# Patient Record
Sex: MALE | Race: WHITE | NOT HISPANIC OR LATINO | Employment: FULL TIME | URBAN - METROPOLITAN AREA
[De-identification: names, ages, dates, MRNs, and addresses within clinical notes are randomized per-mention and may not be internally consistent; named-entity substitution may affect disease eponyms.]

---

## 2017-01-03 DIAGNOSIS — IMO0002 UNCONTROLLED TYPE 2 DIABETES MELLITUS WITH COMPLICATION, WITHOUT LONG-TERM CURRENT USE OF INSULIN: ICD-10-CM

## 2017-01-25 DIAGNOSIS — IMO0002 UNCONTROLLED TYPE 2 DIABETES MELLITUS WITH COMPLICATION, WITHOUT LONG-TERM CURRENT USE OF INSULIN: ICD-10-CM

## 2017-01-26 RX ORDER — INSULIN ASPART 100 [IU]/ML
INJECTION, SOLUTION INTRAVENOUS; SUBCUTANEOUS
Qty: 10 PEN | Refills: 1 | Status: SHIPPED | OUTPATIENT
Start: 2017-01-26 | End: 2017-02-27

## 2017-02-01 ENCOUNTER — OFFICE VISIT (OUTPATIENT)
Dept: FAMILY MEDICINE CLINIC | Facility: CLINIC | Age: 62
End: 2017-02-01

## 2017-02-01 VITALS
TEMPERATURE: 97.5 F | BODY MASS INDEX: 32.92 KG/M2 | WEIGHT: 264.8 LBS | HEART RATE: 83 BPM | SYSTOLIC BLOOD PRESSURE: 120 MMHG | HEIGHT: 75 IN | OXYGEN SATURATION: 95 % | DIASTOLIC BLOOD PRESSURE: 68 MMHG

## 2017-02-01 DIAGNOSIS — J40 BRONCHITIS: ICD-10-CM

## 2017-02-01 DIAGNOSIS — R53.83 TIRED: Primary | ICD-10-CM

## 2017-02-01 PROCEDURE — 99214 OFFICE O/P EST MOD 30 MIN: CPT | Performed by: FAMILY MEDICINE

## 2017-02-01 RX ORDER — LEVOFLOXACIN 500 MG/1
500 TABLET, FILM COATED ORAL DAILY
Qty: 10 TABLET | Refills: 1 | Status: SHIPPED | OUTPATIENT
Start: 2017-02-01 | End: 2017-02-11

## 2017-02-01 NOTE — PROGRESS NOTES
Subjective   Kem Alvarado is a 61 y.o. male.     History of Present Illness Kem Laket 61 y.o. male who presents for evaluation of upper respiratory congestion. Symptoms include congestion, dry cough and wheezing.  Onset of symptoms was 2 weeks ago, gradually worsening since that time. Patient denies shortness of breath, fever, sinus pain.   Evaluation to date: none Treatment to date:  none.       The following portions of the patient's history were reviewed and updated as appropriate: allergies, current medications, past family history, past medical history, past social history, past surgical history and problem list.    Review of Systems   Constitutional: Positive for fatigue. Negative for fever.   HENT: Negative for congestion, postnasal drip, sinus pressure and sore throat.    Respiratory: Positive for cough and wheezing. Negative for shortness of breath.    Gastrointestinal: Negative for nausea and vomiting.   Skin: Negative.        Objective   Physical Exam   Constitutional: He appears well-developed and well-nourished.   HENT:   Head: Normocephalic.   Right Ear: Tympanic membrane normal.   Left Ear: Tympanic membrane normal.   Mouth/Throat: Oropharynx is clear and moist.   Eyes: EOM are normal. Pupils are equal, round, and reactive to light.   Cardiovascular: Normal rate, regular rhythm and normal heart sounds.    Pulmonary/Chest: Effort normal and breath sounds normal.   Lymphadenopathy:     He has no cervical adenopathy.   Skin: Skin is warm and dry.   Psychiatric: He has a normal mood and affect. His behavior is normal. Judgment and thought content normal.   Vitals reviewed.      Assessment/Plan   Kem was seen today for uri and cough.    Diagnoses and all orders for this visit:    Tired  -     Testosterone  -     TSH  -     CBC & AUTO Differential    Bronchitis  -     levoFLOXacin (LEVAQUIN) 500 MG tablet; Take 1 tablet by mouth Daily for 10 days.

## 2017-02-02 LAB
BASOPHILS # BLD AUTO: 0.03 10*3/MM3 (ref 0–0.2)
BASOPHILS NFR BLD AUTO: 0.4 % (ref 0–1.5)
CONV COMMENT: ABNORMAL
EOSINOPHIL # BLD AUTO: 0.19 10*3/MM3 (ref 0–0.7)
EOSINOPHIL NFR BLD AUTO: 2.6 % (ref 0.3–6.2)
ERYTHROCYTE [DISTWIDTH] IN BLOOD BY AUTOMATED COUNT: 13.5 % (ref 11.5–14.5)
HCT VFR BLD AUTO: 42.2 % (ref 40.4–52.2)
HGB BLD-MCNC: 13.8 G/DL (ref 13.7–17.6)
IMM GRANULOCYTES # BLD: 0.02 10*3/MM3 (ref 0–0.03)
IMM GRANULOCYTES NFR BLD: 0.3 % (ref 0–0.5)
LYMPHOCYTES # BLD AUTO: 1.56 10*3/MM3 (ref 0.9–4.8)
LYMPHOCYTES NFR BLD AUTO: 21 % (ref 19.6–45.3)
MCH RBC QN AUTO: 29.1 PG (ref 27–32.7)
MCHC RBC AUTO-ENTMCNC: 32.7 G/DL (ref 32.6–36.4)
MCV RBC AUTO: 89 FL (ref 79.8–96.2)
MONOCYTES # BLD AUTO: 0.71 10*3/MM3 (ref 0.2–1.2)
MONOCYTES NFR BLD AUTO: 9.6 % (ref 5–12)
NEUTROPHILS # BLD AUTO: 4.92 10*3/MM3 (ref 1.9–8.1)
NEUTROPHILS NFR BLD AUTO: 66.1 % (ref 42.7–76)
PLATELET # BLD AUTO: 237 10*3/MM3 (ref 140–500)
RBC # BLD AUTO: 4.74 10*6/MM3 (ref 4.6–6)
TESTOST SERPL-MCNC: 151 NG/DL (ref 348–1197)
TSH SERPL DL<=0.005 MIU/L-ACNC: 3.45 MIU/ML (ref 0.27–4.2)
WBC # BLD AUTO: 7.43 10*3/MM3 (ref 4.5–10.7)

## 2017-02-02 RX ORDER — TESTOSTERONE 16.2 MG/G
2 GEL TRANSDERMAL DAILY
Qty: 75 G | Refills: 5 | Status: SHIPPED | OUTPATIENT
Start: 2017-02-02 | End: 2017-08-15 | Stop reason: SDUPTHER

## 2017-02-14 DIAGNOSIS — IMO0002 UNCONTROLLED TYPE 2 DIABETES MELLITUS WITH COMPLICATION, WITHOUT LONG-TERM CURRENT USE OF INSULIN: ICD-10-CM

## 2017-02-14 DIAGNOSIS — E11.69 HYPERLIPIDEMIA ASSOCIATED WITH TYPE 2 DIABETES MELLITUS (HCC): Primary | ICD-10-CM

## 2017-02-14 DIAGNOSIS — E11.9 CONTROLLED TYPE 2 DIABETES MELLITUS WITHOUT COMPLICATION, WITHOUT LONG-TERM CURRENT USE OF INSULIN (HCC): ICD-10-CM

## 2017-02-14 DIAGNOSIS — E55.9 VITAMIN D DEFICIENCY: Primary | ICD-10-CM

## 2017-02-14 DIAGNOSIS — E03.9 ACQUIRED HYPOTHYROIDISM: ICD-10-CM

## 2017-02-14 DIAGNOSIS — E78.5 HYPERLIPIDEMIA ASSOCIATED WITH TYPE 2 DIABETES MELLITUS (HCC): Primary | ICD-10-CM

## 2017-02-14 RX ORDER — INSULIN GLARGINE 100 [IU]/ML
INJECTION, SOLUTION SUBCUTANEOUS
Qty: 15 ML | Refills: 0 | Status: SHIPPED | OUTPATIENT
Start: 2017-02-14 | End: 2017-02-27 | Stop reason: SDUPTHER

## 2017-02-20 ENCOUNTER — CONVERSION ENCOUNTER (OUTPATIENT)
Dept: ENDOCRINOLOGY | Age: 62
End: 2017-02-20

## 2017-02-20 ENCOUNTER — LAB (OUTPATIENT)
Dept: ENDOCRINOLOGY | Age: 62
End: 2017-02-20

## 2017-02-20 DIAGNOSIS — E11.69 HYPERLIPIDEMIA ASSOCIATED WITH TYPE 2 DIABETES MELLITUS (HCC): ICD-10-CM

## 2017-02-20 DIAGNOSIS — E03.9 ACQUIRED HYPOTHYROIDISM: ICD-10-CM

## 2017-02-20 DIAGNOSIS — E55.9 VITAMIN D DEFICIENCY: Primary | ICD-10-CM

## 2017-02-20 DIAGNOSIS — E55.9 VITAMIN D DEFICIENCY: ICD-10-CM

## 2017-02-20 DIAGNOSIS — E78.5 HYPERLIPIDEMIA ASSOCIATED WITH TYPE 2 DIABETES MELLITUS (HCC): ICD-10-CM

## 2017-02-20 DIAGNOSIS — E11.9 CONTROLLED TYPE 2 DIABETES MELLITUS WITHOUT COMPLICATION, WITHOUT LONG-TERM CURRENT USE OF INSULIN (HCC): ICD-10-CM

## 2017-02-21 LAB
25(OH)D3+25(OH)D2 SERPL-MCNC: 20.5 NG/ML (ref 30–100)
ALBUMIN SERPL-MCNC: 4.5 G/DL (ref 3.5–5.2)
ALBUMIN/GLOB SERPL: 1.7 G/DL
ALP SERPL-CCNC: 78 U/L (ref 39–117)
ALT SERPL-CCNC: 29 U/L (ref 1–41)
AST SERPL-CCNC: 33 U/L (ref 1–40)
BILIRUB SERPL-MCNC: 0.5 MG/DL (ref 0.1–1.2)
BUN SERPL-MCNC: 29 MG/DL (ref 8–23)
BUN/CREAT SERPL: 18.2 (ref 7–25)
C PEPTIDE SERPL-MCNC: 1.5 NG/ML (ref 1.1–4.4)
CALCIUM SERPL-MCNC: 9.5 MG/DL (ref 8.6–10.5)
CHLORIDE SERPL-SCNC: 102 MMOL/L (ref 98–107)
CHOLEST SERPL-MCNC: 170 MG/DL (ref 0–200)
CO2 SERPL-SCNC: 23.9 MMOL/L (ref 22–29)
CREAT SERPL-MCNC: 1.59 MG/DL (ref 0.76–1.27)
GLOBULIN SER CALC-MCNC: 2.7 GM/DL
GLUCOSE SERPL-MCNC: 118 MG/DL (ref 65–99)
HBA1C MFR BLD: 7.6 % (ref 4.8–5.6)
HDLC SERPL-MCNC: 43 MG/DL (ref 40–60)
LDLC SERPL CALC-MCNC: 56 MG/DL (ref 0–100)
MICROALBUMIN UR-MCNC: 20.3 UG/ML
POTASSIUM SERPL-SCNC: 4.1 MMOL/L (ref 3.5–5.2)
PROT SERPL-MCNC: 7.2 G/DL (ref 6–8.5)
SODIUM SERPL-SCNC: 143 MMOL/L (ref 136–145)
T3FREE SERPL-MCNC: 3.3 PG/ML (ref 2–4.4)
T4 FREE SERPL-MCNC: 0.96 NG/DL (ref 0.93–1.7)
T4 SERPL-MCNC: 5.36 MCG/DL (ref 4.5–11.7)
TRIGL SERPL-MCNC: 355 MG/DL (ref 0–150)
TSH SERPL DL<=0.005 MIU/L-ACNC: 5.64 MIU/ML (ref 0.27–4.2)
URATE SERPL-MCNC: 9 MG/DL (ref 3.4–7)
VLDLC SERPL CALC-MCNC: 71 MG/DL (ref 5–40)

## 2017-02-27 ENCOUNTER — OFFICE VISIT (OUTPATIENT)
Dept: ENDOCRINOLOGY | Age: 62
End: 2017-02-27

## 2017-02-27 VITALS
HEIGHT: 75 IN | DIASTOLIC BLOOD PRESSURE: 88 MMHG | BODY MASS INDEX: 32.9 KG/M2 | WEIGHT: 264.6 LBS | SYSTOLIC BLOOD PRESSURE: 142 MMHG | RESPIRATION RATE: 16 BRPM

## 2017-02-27 DIAGNOSIS — E78.1 FAMILIAL HYPERTRIGLYCERIDEMIA: ICD-10-CM

## 2017-02-27 DIAGNOSIS — E78.2 MIXED HYPERLIPIDEMIA: ICD-10-CM

## 2017-02-27 DIAGNOSIS — E11.69 HYPERLIPIDEMIA ASSOCIATED WITH TYPE 2 DIABETES MELLITUS (HCC): ICD-10-CM

## 2017-02-27 DIAGNOSIS — IMO0002 UNCONTROLLED TYPE 2 DIABETES MELLITUS WITH COMPLICATION, WITHOUT LONG-TERM CURRENT USE OF INSULIN: ICD-10-CM

## 2017-02-27 DIAGNOSIS — I10 BENIGN ESSENTIAL HYPERTENSION: ICD-10-CM

## 2017-02-27 DIAGNOSIS — E03.9 ACQUIRED HYPOTHYROIDISM: ICD-10-CM

## 2017-02-27 DIAGNOSIS — R79.89 LOW TESTOSTERONE: ICD-10-CM

## 2017-02-27 DIAGNOSIS — E78.5 HYPERLIPIDEMIA ASSOCIATED WITH TYPE 2 DIABETES MELLITUS (HCC): ICD-10-CM

## 2017-02-27 DIAGNOSIS — E11.9 CONTROLLED TYPE 2 DIABETES MELLITUS WITHOUT COMPLICATION, WITHOUT LONG-TERM CURRENT USE OF INSULIN (HCC): Primary | ICD-10-CM

## 2017-02-27 PROCEDURE — 99215 OFFICE O/P EST HI 40 MIN: CPT | Performed by: INTERNAL MEDICINE

## 2017-02-27 RX ORDER — ICOSAPENT ETHYL 1000 MG/1
2 CAPSULE ORAL 2 TIMES DAILY WITH MEALS
Qty: 120 CAPSULE | Refills: 3 | Status: SHIPPED | OUTPATIENT
Start: 2017-02-27 | End: 2017-05-15 | Stop reason: SDUPTHER

## 2017-02-27 RX ORDER — NATEGLINIDE 120 MG/1
120 TABLET ORAL
Qty: 90 TABLET | Refills: 5 | Status: SHIPPED | OUTPATIENT
Start: 2017-02-27 | End: 2017-05-15 | Stop reason: SDUPTHER

## 2017-02-27 RX ORDER — ROSUVASTATIN CALCIUM 20 MG/1
20 TABLET, COATED ORAL NIGHTLY
Qty: 30 TABLET | Refills: 5 | Status: SHIPPED | OUTPATIENT
Start: 2017-02-27 | End: 2017-05-15 | Stop reason: SDUPTHER

## 2017-02-27 RX ORDER — LIRAGLUTIDE 6 MG/ML
INJECTION SUBCUTANEOUS
Qty: 3 PEN | Refills: 5 | Status: SHIPPED | OUTPATIENT
Start: 2017-02-27 | End: 2017-05-15 | Stop reason: SDUPTHER

## 2017-02-27 RX ORDER — LEVOTHYROXINE SODIUM 0.07 MG/1
75 TABLET ORAL DAILY
Qty: 30 TABLET | Refills: 5 | Status: SHIPPED | OUTPATIENT
Start: 2017-02-27 | End: 2017-05-15 | Stop reason: SDUPTHER

## 2017-02-27 RX ORDER — ALLOPURINOL 100 MG/1
100 TABLET ORAL DAILY
Qty: 30 TABLET | Refills: 5 | Status: SHIPPED | OUTPATIENT
Start: 2017-02-27 | End: 2017-05-15 | Stop reason: SDUPTHER

## 2017-02-27 RX ORDER — FENOFIBRATE 145 MG/1
145 TABLET, COATED ORAL DAILY
Qty: 30 TABLET | Refills: 5 | Status: SHIPPED | OUTPATIENT
Start: 2017-02-27 | End: 2017-05-15 | Stop reason: SDUPTHER

## 2017-02-27 NOTE — PROGRESS NOTES
"Subjective   Kem Alvarado is a 61 y.o. male seen for follow up for DM2, hyperlipidemia, vit d deficiency, lab review. Patient is checking BG 2-3 times a day. He had one hypoglycemic episode and BG dropped to the 50's. He denies any other problems or concerns.     History of Present Illness this is a 61-year-old gentleman known patient with type II diabetes hypertension dyslipidemia as well as vitamin D deficiency as well as hypothyroidism.  Over the course of last the 4 months she has had no significant health problems for which to go to emergency room or hospital.  Visit Vitals   • /88   • Resp 16   • Ht 75\" (190.5 cm)   • Wt 264 lb 9.6 oz (120 kg)   • BMI 33.07 kg/m2     No Known Allergies      Current Outpatient Prescriptions:   •  amLODIPine (NORVASC) 5 MG tablet, Take 1 tablet by mouth 2 (Two) Times a Day., Disp: 60 tablet, Rfl: 5  •  ELIQUIS 5 MG tablet tablet, TAKE ONE TABLET BY MOUTH TWO TIMES A DAY, Disp: 60 tablet, Rfl: 11  •  glucose blood (ACCU-CHEK MARIA L) test strip, Check blood sugar 4-5 times daily, Disp: 200 each, Rfl: 5  •  icosapent ethyl (VASCEPA) 1 G capsule capsule, Take 2 g by mouth 2 (Two) Times a Day With Meals., Disp: 120 capsule, Rfl: 3  •  Insulin Pen Needle (PEN NEEDLES) 31G X 6 MM misc, 5-6 injections daily, Disp: 200 each, Rfl: 3  •  LANTUS SOLOSTAR 100 UNIT/ML injection pen, Inject 35 units in the morning and titrate up as instructed, Disp: 15 mL, Rfl: 0  •  levothyroxine (SYNTHROID, LEVOTHROID) 25 MCG tablet, TAKE 1 TABLET BY MOUTH DAILY AS DIRECTED., Disp: 30 tablet, Rfl: 5  •  metoprolol tartrate (LOPRESSOR) 50 MG tablet, Take 1 tablet by mouth 2 (Two) Times a Day., Disp: 60 tablet, Rfl: 5  •  NOVOLOG FLEXPEN 100 UNIT/ML solution pen-injector sc pen, Inject 15 units before meals meals with sliding scale, Disp: 10 pen, Rfl: 1  •  rosuvastatin (CRESTOR) 20 MG tablet, Take 1 tablet by mouth Every Night., Disp: 30 tablet, Rfl: 4  •  saxagliptin (ONGLYZA) 5 MG tablet, Take 1 " tablet by mouth Daily., Disp: 30 tablet, Rfl: 5  •  Testosterone (ANDROGEL PUMP) 20.25 MG/ACT (1.62%) gel, Place 2 applicators on the skin Daily., Disp: 75 g, Rfl: 5        The following portions of the patient's history were reviewed and updated as appropriate: allergies, current medications, past family history, past medical history, past social history, past surgical history and problem list.    Review of Systems   Constitutional: Positive for fatigue.   HENT: Negative.    Eyes: Negative.    Respiratory: Negative.    Cardiovascular: Negative.    Gastrointestinal: Negative.    Endocrine: Positive for polydipsia.   Genitourinary: Negative.    Musculoskeletal: Negative.    Skin: Negative.  Negative for pallor.   Allergic/Immunologic: Negative.    Neurological: Positive for tremors.   Hematological: Negative.    Psychiatric/Behavioral: The patient is nervous/anxious.        Objective   Physical Exam   Constitutional: He is oriented to person, place, and time. He appears well-developed and well-nourished. No distress.   HENT:   Head: Normocephalic and atraumatic.   Right Ear: External ear normal.   Left Ear: External ear normal.   Nose: Nose normal.   Mouth/Throat: Oropharynx is clear and moist. No oropharyngeal exudate.   Eyes: Conjunctivae and EOM are normal. Pupils are equal, round, and reactive to light. Right eye exhibits no discharge. Left eye exhibits no discharge. No scleral icterus.   Neck: Normal range of motion. Neck supple. No JVD present. No tracheal deviation present. No thyromegaly present.   Cardiovascular: Normal rate, regular rhythm, normal heart sounds and intact distal pulses.  Exam reveals no gallop and no friction rub.    No murmur heard.  Pulmonary/Chest: Effort normal and breath sounds normal. No stridor. No respiratory distress. He has no wheezes. He has no rales. He exhibits no tenderness.   Abdominal: Soft. Bowel sounds are normal. He exhibits no distension and no mass. There is no  tenderness. There is no rebound and no guarding. No hernia.   Musculoskeletal: Normal range of motion. He exhibits no edema, tenderness or deformity.   Lymphadenopathy:     He has no cervical adenopathy.   Neurological: He is alert and oriented to person, place, and time. He has normal reflexes. He displays normal reflexes. No cranial nerve deficit. He exhibits normal muscle tone. Coordination normal.   Skin: Skin is warm and dry. No rash noted. He is not diaphoretic. No erythema. No pallor.   Psychiatric: He has a normal mood and affect. His behavior is normal. Judgment and thought content normal.   Nursing note and vitals reviewed.     Results for orders placed or performed in visit on 02/20/17   Comprehensive Metabolic Panel   Result Value Ref Range    Glucose 118 (H) 65 - 99 mg/dL    BUN 29 (H) 8 - 23 mg/dL    Creatinine 1.59 (H) 0.76 - 1.27 mg/dL    eGFR Non African Am 45 (L) >60 mL/min/1.73    eGFR African Am 54 (L) >60 mL/min/1.73    BUN/Creatinine Ratio 18.2 7.0 - 25.0    Sodium 143 136 - 145 mmol/L    Potassium 4.1 3.5 - 5.2 mmol/L    Chloride 102 98 - 107 mmol/L    Total CO2 23.9 22.0 - 29.0 mmol/L    Calcium 9.5 8.6 - 10.5 mg/dL    Total Protein 7.2 6.0 - 8.5 g/dL    Albumin 4.50 3.50 - 5.20 g/dL    Globulin 2.7 gm/dL    A/G Ratio 1.7 g/dL    Total Bilirubin 0.5 0.1 - 1.2 mg/dL    Alkaline Phosphatase 78 39 - 117 U/L    AST (SGOT) 33 1 - 40 U/L    ALT (SGPT) 29 1 - 41 U/L   C-Peptide   Result Value Ref Range    C-Peptide 1.5 1.1 - 4.4 ng/mL   Hemoglobin A1c   Result Value Ref Range    Hemoglobin A1C 7.60 (H) 4.80 - 5.60 %   Lipid Panel   Result Value Ref Range    Total Cholesterol 170 0 - 200 mg/dL    Triglycerides 355 (H) 0 - 150 mg/dL    HDL Cholesterol 43 40 - 60 mg/dL    VLDL Cholesterol 71 (H) 5 - 40 mg/dL    LDL Cholesterol  56 0 - 100 mg/dL   T4, Free   Result Value Ref Range    Free T4 0.96 0.93 - 1.70 ng/dL   T4 & TSH ([facility])   Result Value Ref Range    TSH 5.640 (H) 0.270 - 4.200 mIU/mL    T4,  Total 5.36 4.50 - 11.70 mcg/dL   T3, Free   Result Value Ref Range    T3, Free 3.3 2.0 - 4.4 pg/mL   Uric Acid   Result Value Ref Range    Uric Acid 9.0 (H) 3.4 - 7.0 mg/dL   MicroAlbumin, Urine, Random   Result Value Ref Range    Microalbumin, Urine 20.3 Not Estab. ug/mL           Assessment/Plan   Diagnoses and all orders for this visit:    Controlled type 2 diabetes mellitus without complication, without long-term current use of insulin  -     T3, Free; Future  -     T4 & TSH ([facility]); Future  -     T4, Free; Future  -     Uric Acid; Future  -     Vitamin D 25 Hydroxy; Future  -     Comprehensive Metabolic Panel; Future  -     C-Peptide; Future  -     Hemoglobin A1c; Future  -     Lipid Panel; Future  -     MicroAlbumin, Urine, Random; Future  -     Luteinizing Hormone; Future  -     Follicle Stimulating Hormone; Future  -     PSA; Future  -     Hemoglobin & Hematocrit, Blood; Future    Acquired hypothyroidism  -     T3, Free; Future  -     T4 & TSH ([facility]); Future  -     T4, Free; Future  -     Uric Acid; Future  -     Vitamin D 25 Hydroxy; Future  -     Comprehensive Metabolic Panel; Future  -     C-Peptide; Future  -     Hemoglobin A1c; Future  -     Lipid Panel; Future  -     MicroAlbumin, Urine, Random; Future  -     Luteinizing Hormone; Future  -     Follicle Stimulating Hormone; Future  -     PSA; Future  -     Hemoglobin & Hematocrit, Blood; Future    Benign essential hypertension  -     T3, Free; Future  -     T4 & TSH ([facility]); Future  -     T4, Free; Future  -     Uric Acid; Future  -     Vitamin D 25 Hydroxy; Future  -     Comprehensive Metabolic Panel; Future  -     C-Peptide; Future  -     Hemoglobin A1c; Future  -     Lipid Panel; Future  -     MicroAlbumin, Urine, Random; Future  -     Luteinizing Hormone; Future  -     Follicle Stimulating Hormone; Future  -     PSA; Future  -     Hemoglobin & Hematocrit, Blood; Future    Hyperlipidemia associated with type 2 diabetes mellitus  -     T3,  Free; Future  -     T4 & TSH ([facility]); Future  -     T4, Free; Future  -     Uric Acid; Future  -     Vitamin D 25 Hydroxy; Future  -     Comprehensive Metabolic Panel; Future  -     C-Peptide; Future  -     Hemoglobin A1c; Future  -     Lipid Panel; Future  -     MicroAlbumin, Urine, Random; Future  -     Luteinizing Hormone; Future  -     Follicle Stimulating Hormone; Future  -     PSA; Future  -     Hemoglobin & Hematocrit, Blood; Future    Mixed hyperlipidemia  -     rosuvastatin (CRESTOR) 20 MG tablet; Take 1 tablet by mouth Every Night.  -     T3, Free; Future  -     T4 & TSH ([facility]); Future  -     T4, Free; Future  -     Uric Acid; Future  -     Vitamin D 25 Hydroxy; Future  -     Comprehensive Metabolic Panel; Future  -     C-Peptide; Future  -     Hemoglobin A1c; Future  -     Lipid Panel; Future  -     MicroAlbumin, Urine, Random; Future  -     Luteinizing Hormone; Future  -     Follicle Stimulating Hormone; Future  -     PSA; Future  -     Hemoglobin & Hematocrit, Blood; Future    Familial hypertriglyceridemia  -     VASCEPA 1 G capsule capsule; Take 2 g by mouth 2 (Two) Times a Day With Meals.  -     T3, Free; Future  -     T4 & TSH ([facility]); Future  -     T4, Free; Future  -     Uric Acid; Future  -     Vitamin D 25 Hydroxy; Future  -     Comprehensive Metabolic Panel; Future  -     C-Peptide; Future  -     Hemoglobin A1c; Future  -     Lipid Panel; Future  -     MicroAlbumin, Urine, Random; Future  -     Luteinizing Hormone; Future  -     Follicle Stimulating Hormone; Future  -     PSA; Future  -     Hemoglobin & Hematocrit, Blood; Future    Uncontrolled type 2 diabetes mellitus with complication, without long-term current use of insulin  -     Insulin Glargine (LANTUS SOLOSTAR) 100 UNIT/ML injection pen; 40 units daily every 3 days if fasting blood sugar is greater than 110 as 2 units up to 50 units  -     T3, Free; Future  -     T4 & TSH ([facility]); Future  -     T4, Free; Future  -     Uric Acid;  Future  -     Vitamin D 25 Hydroxy; Future  -     Comprehensive Metabolic Panel; Future  -     C-Peptide; Future  -     Hemoglobin A1c; Future  -     Lipid Panel; Future  -     MicroAlbumin, Urine, Random; Future  -     Luteinizing Hormone; Future  -     Follicle Stimulating Hormone; Future  -     PSA; Future  -     Hemoglobin & Hematocrit, Blood; Future    Low testosterone  -     T3, Free; Future  -     T4 & TSH ([facility]); Future  -     T4, Free; Future  -     Uric Acid; Future  -     Vitamin D 25 Hydroxy; Future  -     Comprehensive Metabolic Panel; Future  -     C-Peptide; Future  -     Hemoglobin A1c; Future  -     Lipid Panel; Future  -     MicroAlbumin, Urine, Random; Future  -     Luteinizing Hormone; Future  -     Follicle Stimulating Hormone; Future  -     PSA; Future  -     Hemoglobin & Hematocrit, Blood; Future    Other orders  -     VICTOZA 18 MG/3ML solution pen-injector; 0.6 mg a day for 7 days 1.2 mg daily for 7 days 1.8 mg maintenance daily  -     levothyroxine (SYNTHROID) 75 MCG tablet; Take 1 tablet by mouth Daily.  -     nateglinide (STARLIX) 120 MG tablet; Take 1 tablet by mouth 3 (Three) Times a Day Before Meals.  -     allopurinol (ZYLOPRIM) 100 MG tablet; Take 1 tablet by mouth Daily.  -     fenofibrate (TRICOR) 145 MG tablet; Take 1 tablet by mouth Daily.              in summary I saw and examined this 61-year-old gentleman for above-mentioned problems.  I reviewed his laboratory evaluation of 02/20/2017 and compare that with the one of them 10/24/2016.  There is a remarkable improvement in his hemoglobin A1c from 12.022 7.60.  His testosterone was low and he is already been started on AndroGel 40.5 milligrams daily.  His TSH is elevated and for that I am increasing her levothyroxin to 75 µg daily his uric acid is elevated and for that I am is starting him on allopurinol 100 mg daily his triglyceride is elevated and for that I am adding fenofibrate 145 mg daily.  Also to simplify his diabetes  regimen I am continuing his the Lantus at 40 units every morning and every 3 days if his blood glucose is greater than 110 he will at 2 units until he reaches blood glucose of 110 or slightly less in the morning.  I will give him Victoza to start at 0.6 mg daily and then for second week 1.2 mg daily and then on a maintenance of 1.8 mg daily and instead of mealtime insulin I am switching him to Starlix 120 mg before each meal.  He will continue rest of his medications and will see Ms. Analiaceleste Bang in 4 months or sooner if needed with laboratory evaluation prior to each office visit.  This office visit including patient counseling which occupied 50% of the entire time exceeded 40 minutes.

## 2017-02-28 ENCOUNTER — TELEPHONE (OUTPATIENT)
Dept: ENDOCRINOLOGY | Age: 62
End: 2017-02-28

## 2017-03-03 RX ORDER — ERGOCALCIFEROL 1.25 MG/1
50000 CAPSULE ORAL WEEKLY
Qty: 13 CAPSULE | Refills: 3 | Status: SHIPPED | OUTPATIENT
Start: 2017-03-03 | End: 2017-05-15 | Stop reason: SDUPTHER

## 2017-04-25 DIAGNOSIS — I10 BENIGN ESSENTIAL HYPERTENSION: ICD-10-CM

## 2017-04-25 RX ORDER — METOPROLOL TARTRATE 50 MG/1
50 TABLET, FILM COATED ORAL 2 TIMES DAILY
Qty: 60 TABLET | Refills: 5 | Status: SHIPPED | OUTPATIENT
Start: 2017-04-25 | End: 2017-05-15 | Stop reason: SDUPTHER

## 2017-05-10 ENCOUNTER — LAB (OUTPATIENT)
Dept: ENDOCRINOLOGY | Age: 62
End: 2017-05-10

## 2017-05-10 DIAGNOSIS — E11.9 CONTROLLED TYPE 2 DIABETES MELLITUS WITHOUT COMPLICATION, WITHOUT LONG-TERM CURRENT USE OF INSULIN (HCC): ICD-10-CM

## 2017-05-10 DIAGNOSIS — R79.89 LOW TESTOSTERONE: ICD-10-CM

## 2017-05-10 DIAGNOSIS — E78.5 HYPERLIPIDEMIA ASSOCIATED WITH TYPE 2 DIABETES MELLITUS (HCC): ICD-10-CM

## 2017-05-10 DIAGNOSIS — E78.1 FAMILIAL HYPERTRIGLYCERIDEMIA: ICD-10-CM

## 2017-05-10 DIAGNOSIS — IMO0002 UNCONTROLLED TYPE 2 DIABETES MELLITUS WITH COMPLICATION, WITHOUT LONG-TERM CURRENT USE OF INSULIN: ICD-10-CM

## 2017-05-10 DIAGNOSIS — I10 BENIGN ESSENTIAL HYPERTENSION: ICD-10-CM

## 2017-05-10 DIAGNOSIS — E03.9 ACQUIRED HYPOTHYROIDISM: ICD-10-CM

## 2017-05-10 DIAGNOSIS — E11.69 HYPERLIPIDEMIA ASSOCIATED WITH TYPE 2 DIABETES MELLITUS (HCC): ICD-10-CM

## 2017-05-10 DIAGNOSIS — E78.2 MIXED HYPERLIPIDEMIA: ICD-10-CM

## 2017-05-11 LAB
25(OH)D3+25(OH)D2 SERPL-MCNC: 38.1 NG/ML (ref 30–100)
ALBUMIN SERPL-MCNC: 4.7 G/DL (ref 3.5–5.2)
ALBUMIN/GLOB SERPL: 1.6 G/DL
ALP SERPL-CCNC: 48 U/L (ref 39–117)
ALT SERPL-CCNC: 28 U/L (ref 1–41)
AST SERPL-CCNC: 38 U/L (ref 1–40)
BILIRUB SERPL-MCNC: 0.3 MG/DL (ref 0.1–1.2)
BUN SERPL-MCNC: 35 MG/DL (ref 8–23)
BUN/CREAT SERPL: 19.8 (ref 7–25)
C PEPTIDE SERPL-MCNC: 4.1 NG/ML (ref 1.1–4.4)
CALCIUM SERPL-MCNC: 10.2 MG/DL (ref 8.6–10.5)
CHLORIDE SERPL-SCNC: 99 MMOL/L (ref 98–107)
CHOLEST SERPL-MCNC: 150 MG/DL (ref 0–200)
CO2 SERPL-SCNC: 25.8 MMOL/L (ref 22–29)
CREAT SERPL-MCNC: 1.77 MG/DL (ref 0.76–1.27)
FSH SERPL-ACNC: 5.9 MIU/ML (ref 1.5–12.4)
GLOBULIN SER CALC-MCNC: 2.9 GM/DL
GLUCOSE SERPL-MCNC: 114 MG/DL (ref 65–99)
HBA1C MFR BLD: 6.8 % (ref 4.8–5.6)
HCT VFR BLD AUTO: 41.6 % (ref 40.4–52.2)
HDLC SERPL-MCNC: 36 MG/DL (ref 40–60)
HGB BLD-MCNC: 13.8 G/DL (ref 13.7–17.6)
LDLC SERPL CALC-MCNC: 49 MG/DL (ref 0–100)
LH SERPL-ACNC: 4.1 MIU/ML (ref 1.7–8.6)
POTASSIUM SERPL-SCNC: 4.8 MMOL/L (ref 3.5–5.2)
PROT SERPL-MCNC: 7.6 G/DL (ref 6–8.5)
PSA SERPL-MCNC: 0.21 NG/ML (ref 0–4)
SODIUM SERPL-SCNC: 141 MMOL/L (ref 136–145)
T3FREE SERPL-MCNC: 2.6 PG/ML (ref 2–4.4)
T4 FREE SERPL-MCNC: 1.31 NG/DL (ref 0.93–1.7)
T4 SERPL-MCNC: 7.11 MCG/DL (ref 4.5–11.7)
TRIGL SERPL-MCNC: 327 MG/DL (ref 0–150)
TSH SERPL DL<=0.005 MIU/L-ACNC: 2.5 MIU/ML (ref 0.27–4.2)
URATE SERPL-MCNC: 6.3 MG/DL (ref 3.4–7)
VLDLC SERPL CALC-MCNC: 65.4 MG/DL (ref 5–40)

## 2017-05-15 ENCOUNTER — OFFICE VISIT (OUTPATIENT)
Dept: ENDOCRINOLOGY | Age: 62
End: 2017-05-15

## 2017-05-15 VITALS
WEIGHT: 258.6 LBS | SYSTOLIC BLOOD PRESSURE: 138 MMHG | DIASTOLIC BLOOD PRESSURE: 86 MMHG | BODY MASS INDEX: 32.15 KG/M2 | HEIGHT: 75 IN

## 2017-05-15 DIAGNOSIS — E78.1 FAMILIAL HYPERTRIGLYCERIDEMIA: ICD-10-CM

## 2017-05-15 DIAGNOSIS — E03.9 ACQUIRED HYPOTHYROIDISM: ICD-10-CM

## 2017-05-15 DIAGNOSIS — I10 BENIGN ESSENTIAL HYPERTENSION: ICD-10-CM

## 2017-05-15 DIAGNOSIS — IMO0002 UNCONTROLLED TYPE 2 DIABETES MELLITUS WITH COMPLICATION, WITHOUT LONG-TERM CURRENT USE OF INSULIN: ICD-10-CM

## 2017-05-15 DIAGNOSIS — E55.9 VITAMIN D DEFICIENCY: ICD-10-CM

## 2017-05-15 DIAGNOSIS — E11.9 CONTROLLED TYPE 2 DIABETES MELLITUS WITHOUT COMPLICATION, WITHOUT LONG-TERM CURRENT USE OF INSULIN (HCC): Primary | ICD-10-CM

## 2017-05-15 DIAGNOSIS — E29.1 MALE HYPOGONADISM: ICD-10-CM

## 2017-05-15 DIAGNOSIS — E78.2 MIXED HYPERLIPIDEMIA: ICD-10-CM

## 2017-05-15 PROCEDURE — 99204 OFFICE O/P NEW MOD 45 MIN: CPT | Performed by: NURSE PRACTITIONER

## 2017-05-15 RX ORDER — FENOFIBRATE 145 MG/1
145 TABLET, COATED ORAL DAILY
Qty: 30 TABLET | Refills: 5 | Status: SHIPPED | OUTPATIENT
Start: 2017-05-15 | End: 2017-08-15 | Stop reason: SDUPTHER

## 2017-05-15 RX ORDER — ICOSAPENT ETHYL 1000 MG/1
2 CAPSULE ORAL 2 TIMES DAILY WITH MEALS
Qty: 120 CAPSULE | Refills: 3 | Status: SHIPPED | OUTPATIENT
Start: 2017-05-15 | End: 2017-08-15 | Stop reason: SDUPTHER

## 2017-05-15 RX ORDER — LIRAGLUTIDE 6 MG/ML
INJECTION SUBCUTANEOUS
Qty: 3 PEN | Refills: 5 | Status: SHIPPED | OUTPATIENT
Start: 2017-05-15 | End: 2017-08-15 | Stop reason: SDUPTHER

## 2017-05-15 RX ORDER — ROSUVASTATIN CALCIUM 20 MG/1
20 TABLET, COATED ORAL NIGHTLY
Qty: 30 TABLET | Refills: 5 | Status: SHIPPED | OUTPATIENT
Start: 2017-05-15 | End: 2017-08-15 | Stop reason: SDUPTHER

## 2017-05-15 RX ORDER — ERGOCALCIFEROL 1.25 MG/1
50000 CAPSULE ORAL WEEKLY
Qty: 13 CAPSULE | Refills: 3 | Status: SHIPPED | OUTPATIENT
Start: 2017-05-15 | End: 2018-05-10

## 2017-05-15 RX ORDER — ALLOPURINOL 100 MG/1
100 TABLET ORAL DAILY
Qty: 30 TABLET | Refills: 5 | Status: SHIPPED | OUTPATIENT
Start: 2017-05-15 | End: 2018-01-19 | Stop reason: SDUPTHER

## 2017-05-15 RX ORDER — NATEGLINIDE 120 MG/1
120 TABLET ORAL
Qty: 90 TABLET | Refills: 5 | Status: SHIPPED | OUTPATIENT
Start: 2017-05-15 | End: 2017-08-15 | Stop reason: SDUPTHER

## 2017-05-15 RX ORDER — METOPROLOL TARTRATE 50 MG/1
50 TABLET, FILM COATED ORAL 2 TIMES DAILY
Qty: 60 TABLET | Refills: 5 | Status: SHIPPED | OUTPATIENT
Start: 2017-05-15 | End: 2017-08-15 | Stop reason: SDUPTHER

## 2017-05-15 RX ORDER — PEN NEEDLE, DIABETIC 31 G X1/4"
NEEDLE, DISPOSABLE MISCELLANEOUS
Qty: 200 EACH | Refills: 3 | Status: SHIPPED | OUTPATIENT
Start: 2017-05-15 | End: 2019-03-12 | Stop reason: SDUPTHER

## 2017-05-15 RX ORDER — LEVOTHYROXINE SODIUM 0.07 MG/1
75 TABLET ORAL DAILY
Qty: 30 TABLET | Refills: 5 | Status: SHIPPED | OUTPATIENT
Start: 2017-05-15 | End: 2018-01-09 | Stop reason: SDUPTHER

## 2017-05-16 ENCOUNTER — TELEPHONE (OUTPATIENT)
Dept: ENDOCRINOLOGY | Age: 62
End: 2017-05-16

## 2017-05-16 LAB
CONV COMMENT: NORMAL
TESTOST FREE SERPL-MCNC: 12.4 PG/ML (ref 6.6–18.1)
TESTOST SERPL-MCNC: 477 NG/DL (ref 348–1197)

## 2017-05-19 ENCOUNTER — TELEPHONE (OUTPATIENT)
Dept: ENDOCRINOLOGY | Age: 62
End: 2017-05-19

## 2017-05-19 RX ORDER — AMLODIPINE BESYLATE 10 MG/1
TABLET ORAL
Qty: 30 TABLET | Refills: 2 | OUTPATIENT
Start: 2017-05-19

## 2017-05-23 ENCOUNTER — OFFICE VISIT (OUTPATIENT)
Dept: CARDIOLOGY | Facility: CLINIC | Age: 62
End: 2017-05-23

## 2017-05-23 VITALS
HEART RATE: 76 BPM | SYSTOLIC BLOOD PRESSURE: 132 MMHG | BODY MASS INDEX: 31.95 KG/M2 | HEIGHT: 75 IN | DIASTOLIC BLOOD PRESSURE: 80 MMHG | OXYGEN SATURATION: 100 % | WEIGHT: 257 LBS

## 2017-05-23 DIAGNOSIS — I10 BENIGN ESSENTIAL HYPERTENSION: ICD-10-CM

## 2017-05-23 DIAGNOSIS — I48.92 ATRIAL FLUTTER, UNSPECIFIED TYPE (HCC): Primary | ICD-10-CM

## 2017-05-23 PROCEDURE — 99213 OFFICE O/P EST LOW 20 MIN: CPT | Performed by: PHYSICIAN ASSISTANT

## 2017-05-23 PROCEDURE — 93000 ELECTROCARDIOGRAM COMPLETE: CPT | Performed by: PHYSICIAN ASSISTANT

## 2017-05-23 RX ORDER — AMLODIPINE BESYLATE 5 MG/1
5 TABLET ORAL 2 TIMES DAILY
Qty: 60 TABLET | Refills: 11 | Status: SHIPPED | OUTPATIENT
Start: 2017-05-23 | End: 2018-05-15 | Stop reason: SDUPTHER

## 2017-07-07 ENCOUNTER — TELEPHONE (OUTPATIENT)
Dept: ENDOCRINOLOGY | Age: 62
End: 2017-07-07

## 2017-07-07 NOTE — TELEPHONE ENCOUNTER
----- Message from JIM Foss sent at 7/7/2017 12:44 PM EDT -----  Contact: patient  Call in one month when he comes to the office will need to get the proper paperwork if we are taken over this treatment.  We obtain the lab work already he's well controlled with his dosages at this time.      ----- Message -----     From: Katie Graves MA     Sent: 7/7/2017  11:25 AM       To: JIM Foss    Is this something that you were going to fill for this patient ?  ----- Message -----     From: Maryan Childers     Sent: 7/7/2017   8:56 AM       To: Katie Graves MA    Pt spoke with [facility] and she knows about his dr who wrote this has  retired and she states she will fill it for him    Needs   Testosterone (ANDROGEL PUMP) 20.25 MG/ACT (1.62%) gel 75 g 5 2/2/2017           [facility] [place], [place] - [address] [place] [address] RD. - [phone]  - [phone] FX

## 2017-07-25 RX ORDER — NATEGLINIDE 120 MG/1
TABLET ORAL
Qty: 90 TABLET | Refills: 5 | Status: SHIPPED | OUTPATIENT
Start: 2017-07-25 | End: 2017-08-15

## 2017-08-04 RX ORDER — TESTOSTERONE 16.2 MG/G
GEL TRANSDERMAL
Qty: 75 G | Refills: 0 | OUTPATIENT
Start: 2017-08-04

## 2017-08-08 ENCOUNTER — LAB (OUTPATIENT)
Dept: ENDOCRINOLOGY | Age: 62
End: 2017-08-08

## 2017-08-08 DIAGNOSIS — E78.5 HYPERLIPIDEMIA ASSOCIATED WITH TYPE 2 DIABETES MELLITUS (HCC): ICD-10-CM

## 2017-08-08 DIAGNOSIS — I10 BENIGN ESSENTIAL HYPERTENSION: ICD-10-CM

## 2017-08-08 DIAGNOSIS — I10 BENIGN ESSENTIAL HYPERTENSION: Primary | ICD-10-CM

## 2017-08-08 DIAGNOSIS — E11.69 HYPERLIPIDEMIA ASSOCIATED WITH TYPE 2 DIABETES MELLITUS (HCC): ICD-10-CM

## 2017-08-08 DIAGNOSIS — E78.1 FAMILIAL HYPERTRIGLYCERIDEMIA: ICD-10-CM

## 2017-08-08 DIAGNOSIS — R79.89 LOW TESTOSTERONE: ICD-10-CM

## 2017-08-08 DIAGNOSIS — E29.1 MALE HYPOGONADISM: ICD-10-CM

## 2017-08-08 DIAGNOSIS — E03.9 ACQUIRED HYPOTHYROIDISM: ICD-10-CM

## 2017-08-08 DIAGNOSIS — IMO0002 UNCONTROLLED TYPE 2 DIABETES MELLITUS WITH COMPLICATION, WITHOUT LONG-TERM CURRENT USE OF INSULIN: ICD-10-CM

## 2017-08-10 LAB
TESTOST FREE SERPL-MCNC: 6.9 PG/ML (ref 6.6–18.1)
TESTOST SERPL-MCNC: 186 NG/DL (ref 264–916)

## 2017-08-15 ENCOUNTER — OFFICE VISIT (OUTPATIENT)
Dept: ENDOCRINOLOGY | Age: 62
End: 2017-08-15

## 2017-08-15 VITALS
BODY MASS INDEX: 32.08 KG/M2 | SYSTOLIC BLOOD PRESSURE: 130 MMHG | DIASTOLIC BLOOD PRESSURE: 82 MMHG | WEIGHT: 258 LBS | HEIGHT: 75 IN

## 2017-08-15 DIAGNOSIS — E03.9 ACQUIRED HYPOTHYROIDISM: ICD-10-CM

## 2017-08-15 DIAGNOSIS — E78.1 FAMILIAL HYPERTRIGLYCERIDEMIA: ICD-10-CM

## 2017-08-15 DIAGNOSIS — E11.69 HYPERLIPIDEMIA ASSOCIATED WITH TYPE 2 DIABETES MELLITUS (HCC): ICD-10-CM

## 2017-08-15 DIAGNOSIS — E11.9 CONTROLLED TYPE 2 DIABETES MELLITUS WITHOUT COMPLICATION, WITHOUT LONG-TERM CURRENT USE OF INSULIN (HCC): Primary | ICD-10-CM

## 2017-08-15 DIAGNOSIS — E78.2 MIXED HYPERLIPIDEMIA: ICD-10-CM

## 2017-08-15 DIAGNOSIS — E78.5 HYPERLIPIDEMIA ASSOCIATED WITH TYPE 2 DIABETES MELLITUS (HCC): ICD-10-CM

## 2017-08-15 DIAGNOSIS — E29.1 MALE HYPOGONADISM: ICD-10-CM

## 2017-08-15 DIAGNOSIS — I10 BENIGN ESSENTIAL HYPERTENSION: ICD-10-CM

## 2017-08-15 PROCEDURE — 99214 OFFICE O/P EST MOD 30 MIN: CPT | Performed by: NURSE PRACTITIONER

## 2017-08-15 RX ORDER — TESTOSTERONE 16.2 MG/G
2 GEL TRANSDERMAL DAILY
Qty: 81 G | Refills: 0 | Status: SHIPPED | OUTPATIENT
Start: 2017-08-15 | End: 2017-12-26 | Stop reason: SDUPTHER

## 2017-08-15 RX ORDER — FENOFIBRATE 145 MG/1
145 TABLET, COATED ORAL DAILY
Qty: 30 TABLET | Refills: 5 | Status: SHIPPED | OUTPATIENT
Start: 2017-08-15 | End: 2018-05-10

## 2017-08-15 RX ORDER — NATEGLINIDE 120 MG/1
120 TABLET ORAL
Qty: 90 TABLET | Refills: 5 | Status: SHIPPED | OUTPATIENT
Start: 2017-08-15 | End: 2018-05-10 | Stop reason: SDUPTHER

## 2017-08-15 RX ORDER — LIRAGLUTIDE 6 MG/ML
INJECTION SUBCUTANEOUS
Qty: 3 PEN | Refills: 5 | Status: SHIPPED | OUTPATIENT
Start: 2017-08-15 | End: 2018-04-23 | Stop reason: SDUPTHER

## 2017-08-15 RX ORDER — METOPROLOL TARTRATE 50 MG/1
50 TABLET, FILM COATED ORAL 2 TIMES DAILY
Qty: 60 TABLET | Refills: 5 | Status: SHIPPED | OUTPATIENT
Start: 2017-08-15 | End: 2019-03-12 | Stop reason: SDUPTHER

## 2017-08-15 RX ORDER — ICOSAPENT ETHYL 1000 MG/1
2 CAPSULE ORAL 2 TIMES DAILY WITH MEALS
Qty: 120 CAPSULE | Refills: 3 | Status: SHIPPED | OUTPATIENT
Start: 2017-08-15 | End: 2018-01-23 | Stop reason: SDUPTHER

## 2017-08-15 RX ORDER — ROSUVASTATIN CALCIUM 20 MG/1
20 TABLET, COATED ORAL NIGHTLY
Qty: 30 TABLET | Refills: 5 | Status: SHIPPED | OUTPATIENT
Start: 2017-08-15 | End: 2018-01-09 | Stop reason: SDUPTHER

## 2017-08-15 NOTE — PROGRESS NOTES
Kem Alvarado  presents to the office  for the follow-up appointment for Type 2 diabetes mellitus.     The diabete's condition location is throughout the system with the  clinical course has fluctuated, the severity is Mild, and the modifying/allievating factors are insulin.  Medications and  Dosages were  reviewed with Kem Alvarado and suggested that compliance most of the time.    Associated symptoms of hyperglycemia have been headache.  Associated symptoms of hypoglycemia have been none. Patient reports  hypoglycemia threshold for symptoms is n/a mg/dl .     The patient is currently on insulin.    Compliance with blood glucose monitoring: good.     Meal panning: The patient is using avoidance of concentrated sweets.    The patient is currently taking home blood tests - Blood glucose testin-3 times daily, that are: fasting- 1st thing in morning before eating or drinking, before each meal, and bedtime   basal insulIn  Lantus U100 50 units in AM     Last instructions given were:  No change in therapy.     Home blood glucose testing daily: 2-3  FB to 125 mg/dl  bedtime 100 to 120 mg/dl     Last reported blood glucose readings are:   Home blood glucose testing daily: 2-4 FB to 120 mg/dl and bedtime 100 to 120 mg/dl. intermittently after meals - dinner - 100 mg/dl Last reported blood glucose readings are: None.    Patterns reported per patient are none.         The following portions of the patient's history were reviewed and updated as appropriate: current medications, past family history, past medical history, past social history, past surgical history and problem list.      Current Outpatient Prescriptions:   •  allopurinol (ZYLOPRIM) 100 MG tablet, Take 1 tablet by mouth Daily., Disp: 30 tablet, Rfl: 5  •  amLODIPine (NORVASC) 5 MG tablet, Take 1 tablet by mouth 2 (Two) Times a Day., Disp: 60 tablet, Rfl: 11  •  ELIQUIS 5 MG tablet tablet, TAKE ONE TABLET BY MOUTH TWO TIMES A DAY, Disp:  60 tablet, Rfl: 11  •  ergocalciferol (DRISDOL) 51252 UNITS capsule, Take 1 capsule by mouth 1 (One) Time Per Week., Disp: 13 capsule, Rfl: 3  •  fenofibrate (TRICOR) 145 MG tablet, Take 1 tablet by mouth Daily., Disp: 30 tablet, Rfl: 5  •  glucose blood (ACCU-CHEK MARIA L) test strip, Check blood sugar 4-5 times daily, Disp: 200 each, Rfl: 5  •  Insulin Glargine (LANTUS SOLOSTAR) 100 UNIT/ML injection pen, 40 units daily every 3 days if fasting blood sugar is greater than 110 as 2 units up to 50 units, Disp: 5 pen, Rfl: 5  •  Insulin Pen Needle (PEN NEEDLES) 31G X 6 MM misc, 5-6 injections daily, Disp: 200 each, Rfl: 3  •  levothyroxine (SYNTHROID) 75 MCG tablet, Take 1 tablet by mouth Daily., Disp: 30 tablet, Rfl: 5  •  metoprolol tartrate (LOPRESSOR) 50 MG tablet, Take 1 tablet by mouth 2 (Two) Times a Day., Disp: 60 tablet, Rfl: 5  •  nateglinide (STARLIX) 120 MG tablet, Take 1 tablet by mouth 3 (Three) Times a Day Before Meals., Disp: 90 tablet, Rfl: 5  •  nateglinide (STARLIX) 120 MG tablet, TAKE 1 TABLET BY MOUTH THREE TIMES A DAY BEFORE MEALS., Disp: 90 tablet, Rfl: 5  •  rosuvastatin (CRESTOR) 20 MG tablet, Take 1 tablet by mouth Every Night., Disp: 30 tablet, Rfl: 5  •  Testosterone (ANDROGEL PUMP) 20.25 MG/ACT (1.62%) gel, Place 2 applicators on the skin Daily., Disp: 75 g, Rfl: 5  •  VASCEPA 1 G capsule capsule, Take 2 g by mouth 2 (Two) Times a Day With Meals., Disp: 120 capsule, Rfl: 3  •  VICTOZA 18 MG/3ML solution pen-injector, 0.6 mg a day for 7 days 1.2 mg daily for 7 days 1.8 mg maintenance daily, Disp: 3 pen, Rfl: 5    Patient Active Problem List    Diagnosis   • Male hypogonadism [E29.1]   • Low testosterone [E29.1]   • Uncontrolled type 2 diabetes mellitus with complication, without long-term current use of insulin [E11.8, E11.65]   • Familial hypertriglyceridemia [E78.1]   • Mixed hyperlipidemia [E78.2]   • Hyperlipidemia associated with type 2 diabetes mellitus [E11.69, E78.5]   • Atrial  "flutter [I48.92]     Overview Note:     Description: Acute episode 9/15. Follows with Dr Fair     • Benign essential hypertension [I10]     Overview Note:     Description: Father  at 52 years of age with MI. Follows with Dr Fair.     • Chronic kidney disease, stage III (moderate) [N18.3]   • Controlled type 2 diabetes mellitus without complication [E11.9]     Overview Note:     Description: Onset after spell of pancreatitis in . Follows with Dr Curran. 3/15 Transfer diabetic care to this office.Follows annually with [facility] for eye exams.     • Hypothyroidism [E03.9]       Review of Systems   A comprehensive review of the 14 systems was negative except of listed below:  Endocrine: hyperglycemia     Objective:     Wt Readings from Last 3 Encounters:   08/15/17 258 lb (117 kg)   17 257 lb (117 kg)   05/15/17 258 lb 9.6 oz (117 kg)     Temp Readings from Last 3 Encounters:   17 97.5 °F (36.4 °C) (Oral)   10/03/16 97.8 °F (36.6 °C)   16 97.8 °F (36.6 °C) (Oral)     BP Readings from Last 3 Encounters:   08/15/17 130/82   17 132/80   05/15/17 138/86     Pulse Readings from Last 3 Encounters:   17 76   17 83   10/03/16 72        /82  Ht 75\" (190.5 cm)  Wt 258 lb (117 kg)  BMI 32.25 kg/m2    General appearance:  alert, cooperative, delirious, fatigued, nourished\" \"appears stated age and cooperative\" \"NAD   Neck: no carotid bruit, supple, symmetrical, trachea midline and thyroid not enlarged, symmetric, no tenderness/mass/nodules   Thyroid:  no palpable nodule, no enlargement, no tenderness   Lung:  \"clear to auscultation bilaterally\" \"no abnormal breath sounds  \" effort was normal, no labored breath, no use of accessory muscles.   Heart: regular rate and rhythm, S1, S2 normal, no murmur, click, rub or gallop      Abdomen:  normal bowel sounds- 4 quads, soft non-tender and contour - slt rounded      Extremities: extremities normal, atraumatic, no cyanosis or edema extremities " "normal, atraumatic, no cyanosis or edema\" WNL - gait and station, strength and stability\"       Skin:   Pulses:  warm and dry, no hyperpigmentation, normal skin coloring, or suspicious lesions   2+ and symmetric   Neuro: Alert and oriented x3. Gait normal. Reflexes and motor strength normal and symmetric. Cranial nerves 2-12 and sensation grossly intact.      Psych: behavior - normal, judgement - normal, mood - normal, affect - normal                 Lab Review  Results for orders placed or performed in visit on 17   Testosterone, Free, Total   Result Value Ref Range    Testosterone, Total 186 (L) 264 - 916 ng/dL    Testosterone, Free 6.9 6.6 - 18.1 pg/mL           Assessment:   Kem was seen today for follow-up.    Diagnoses and all orders for this visit:    Controlled type 2 diabetes mellitus without complication, without long-term current use of insulin    Acquired hypothyroidism    Male hypogonadism    Familial hypertriglyceridemia    Benign essential hypertension    Hyperlipidemia associated with type 2 diabetes mellitus          Plan:    Education:  interpretation of lab results, blood sugar goals, complications of diabetes mellitus, hypoglycemia prevention and treatment, exercise, illness management, self-monitoring of blood glucose skills, nutrition and carbohydrate counting    home blood tests -  Blood glucose testin times daily, that are:  fasting- 1st thing in morning before eating or drinking  before each meal and 1 or 2 hours after meal  bedtime  anytime you feel symptoms of hyperglycemia or hypoglycemia (high or low blood sugars)    Office visit 25 minutes with additional education given 13 minutes - SMBG with goals, medication changes with purposes and s/e profiles.       Return in about 3 months (around 11/15/2017), or if symptoms worsen or fail to improve. 6 weeks labs only, 3 month With [facility]-1 week prior for labs and 6 months with Dr. Chahal-one week prior for labs        Leo " transcription disclaimer     Much of this encounter note is an electronic transcription/translation of spoken language to printed text. The electronic translation of spoken language may permit erroneous, or at times, nonsensical words or phrases to be inadvertently transcribed. Although I have reviewed the note for such errors, some may still exist.

## 2017-08-16 LAB
25(OH)D3+25(OH)D2 SERPL-MCNC: 46.5 NG/ML (ref 30–100)
ALBUMIN SERPL-MCNC: 5 G/DL (ref 3.5–5.2)
ALBUMIN/GLOB SERPL: 1.9 G/DL
ALP SERPL-CCNC: 46 U/L (ref 39–117)
ALT SERPL-CCNC: 32 U/L (ref 1–41)
AST SERPL-CCNC: 42 U/L (ref 1–40)
BASOPHILS # BLD AUTO: 0.04 10*3/MM3 (ref 0–0.2)
BASOPHILS NFR BLD AUTO: 0.6 % (ref 0–1.5)
BILIRUB SERPL-MCNC: 0.4 MG/DL (ref 0.1–1.2)
BUN SERPL-MCNC: 30 MG/DL (ref 8–23)
BUN/CREAT SERPL: 14.9 (ref 7–25)
C PEPTIDE SERPL-MCNC: 3.6 NG/ML (ref 1.1–4.4)
CALCIUM SERPL-MCNC: 10 MG/DL (ref 8.6–10.5)
CHLORIDE SERPL-SCNC: 100 MMOL/L (ref 98–107)
CHOLEST SERPL-MCNC: 104 MG/DL (ref 0–200)
CO2 SERPL-SCNC: 23.6 MMOL/L (ref 22–29)
CREAT SERPL-MCNC: 2.02 MG/DL (ref 0.76–1.27)
EOSINOPHIL # BLD AUTO: 0.19 10*3/MM3 (ref 0–0.7)
EOSINOPHIL NFR BLD AUTO: 2.7 % (ref 0.3–6.2)
ERYTHROCYTE [DISTWIDTH] IN BLOOD BY AUTOMATED COUNT: 14.1 % (ref 11.5–14.5)
FSH SERPL-ACNC: 3.5 MIU/ML (ref 1.5–12.4)
FT4I SERPL CALC-MCNC: 2.3 (ref 1.2–4.9)
GLOBULIN SER CALC-MCNC: 2.7 GM/DL
GLUCOSE SERPL-MCNC: 120 MG/DL (ref 65–99)
HBA1C MFR BLD: 6.62 % (ref 4.8–5.6)
HCT VFR BLD AUTO: 43.3 % (ref 40.4–52.2)
HDLC SERPL-MCNC: 38 MG/DL (ref 40–60)
HGB BLD-MCNC: 13.8 G/DL (ref 13.7–17.6)
IMM GRANULOCYTES # BLD: 0 10*3/MM3 (ref 0–0.03)
IMM GRANULOCYTES NFR BLD: 0 % (ref 0–0.5)
LDLC SERPL CALC-MCNC: 42 MG/DL (ref 0–100)
LH SERPL-ACNC: 1.9 MIU/ML (ref 1.7–8.6)
LYMPHOCYTES # BLD AUTO: 1.68 10*3/MM3 (ref 0.9–4.8)
LYMPHOCYTES NFR BLD AUTO: 23.8 % (ref 19.6–45.3)
MCH RBC QN AUTO: 28.5 PG (ref 27–32.7)
MCHC RBC AUTO-ENTMCNC: 31.9 G/DL (ref 32.6–36.4)
MCV RBC AUTO: 89.3 FL (ref 79.8–96.2)
MONOCYTES # BLD AUTO: 0.67 10*3/MM3 (ref 0.2–1.2)
MONOCYTES NFR BLD AUTO: 9.5 % (ref 5–12)
NEUTROPHILS # BLD AUTO: 4.48 10*3/MM3 (ref 1.9–8.1)
NEUTROPHILS NFR BLD AUTO: 63.4 % (ref 42.7–76)
PLATELET # BLD AUTO: 237 10*3/MM3 (ref 140–500)
POTASSIUM SERPL-SCNC: 4.6 MMOL/L (ref 3.5–5.2)
PROT SERPL-MCNC: 7.7 G/DL (ref 6–8.5)
RBC # BLD AUTO: 4.85 10*6/MM3 (ref 4.6–6)
SODIUM SERPL-SCNC: 139 MMOL/L (ref 136–145)
T3FREE SERPL-MCNC: 3.1 PG/ML (ref 2–4.4)
T3RU NFR SERPL: 29 % (ref 24–39)
T4 FREE SERPL-MCNC: 1.48 NG/DL (ref 0.93–1.7)
T4 SERPL-MCNC: 8.1 UG/DL (ref 4.5–12)
TRIGL SERPL-MCNC: 121 MG/DL (ref 0–150)
TSH SERPL DL<=0.005 MIU/L-ACNC: 1.68 UIU/ML (ref 0.45–4.5)
UNABLE TO VOID: NORMAL
URATE SERPL-MCNC: 6 MG/DL (ref 3.4–7)
VLDLC SERPL CALC-MCNC: 24.2 MG/DL (ref 5–40)
WBC # BLD AUTO: 7.06 10*3/MM3 (ref 4.5–10.7)

## 2017-09-08 ENCOUNTER — RESULTS ENCOUNTER (OUTPATIENT)
Dept: ENDOCRINOLOGY | Age: 62
End: 2017-09-08

## 2017-09-08 DIAGNOSIS — E29.1 MALE HYPOGONADISM: ICD-10-CM

## 2017-09-08 DIAGNOSIS — R79.89 LOW TESTOSTERONE: ICD-10-CM

## 2017-09-08 DIAGNOSIS — E03.9 ACQUIRED HYPOTHYROIDISM: ICD-10-CM

## 2017-09-08 DIAGNOSIS — E78.5 HYPERLIPIDEMIA ASSOCIATED WITH TYPE 2 DIABETES MELLITUS (HCC): ICD-10-CM

## 2017-09-08 DIAGNOSIS — E78.1 FAMILIAL HYPERTRIGLYCERIDEMIA: ICD-10-CM

## 2017-09-08 DIAGNOSIS — E11.69 HYPERLIPIDEMIA ASSOCIATED WITH TYPE 2 DIABETES MELLITUS (HCC): ICD-10-CM

## 2017-09-08 DIAGNOSIS — IMO0002 UNCONTROLLED TYPE 2 DIABETES MELLITUS WITH COMPLICATION, WITHOUT LONG-TERM CURRENT USE OF INSULIN: ICD-10-CM

## 2017-09-08 DIAGNOSIS — I10 BENIGN ESSENTIAL HYPERTENSION: ICD-10-CM

## 2017-09-08 RX ORDER — APIXABAN 5 MG/1
TABLET, FILM COATED ORAL
Qty: 60 TABLET | Refills: 11 | Status: SHIPPED | OUTPATIENT
Start: 2017-09-08 | End: 2018-09-06 | Stop reason: SDUPTHER

## 2017-09-18 DIAGNOSIS — E29.1 MALE HYPOGONADISM: ICD-10-CM

## 2017-09-21 RX ORDER — TESTOSTERONE 16.2 MG/G
GEL TRANSDERMAL
Qty: 75 G | Refills: 0 | OUTPATIENT
Start: 2017-09-21

## 2017-09-22 DIAGNOSIS — I10 BENIGN ESSENTIAL HYPERTENSION: ICD-10-CM

## 2017-09-23 RX ORDER — METOPROLOL TARTRATE 50 MG/1
TABLET, FILM COATED ORAL
Qty: 60 TABLET | Refills: 5 | Status: SHIPPED | OUTPATIENT
Start: 2017-09-23 | End: 2018-01-03 | Stop reason: SDUPTHER

## 2017-09-28 LAB
TESTOST FREE SERPL-MCNC: 20.6 PG/ML (ref 6.6–18.1)
TESTOST SERPL-MCNC: 708 NG/DL (ref 264–916)

## 2017-10-11 DIAGNOSIS — E29.1 MALE HYPOGONADISM: ICD-10-CM

## 2017-10-16 RX ORDER — TESTOSTERONE 16.2 MG/G
GEL TRANSDERMAL
Qty: 75 G | Refills: 0 | OUTPATIENT
Start: 2017-10-16

## 2017-11-15 DIAGNOSIS — E29.1 MALE HYPOGONADISM: ICD-10-CM

## 2017-11-20 RX ORDER — TESTOSTERONE 16.2 MG/G
GEL TRANSDERMAL
Qty: 75 G | Refills: 0 | OUTPATIENT
Start: 2017-11-20

## 2017-11-30 DIAGNOSIS — E11.9 CONTROLLED TYPE 2 DIABETES MELLITUS WITHOUT COMPLICATION, WITHOUT LONG-TERM CURRENT USE OF INSULIN (HCC): ICD-10-CM

## 2017-12-01 RX ORDER — LIRAGLUTIDE 6 MG/ML
INJECTION SUBCUTANEOUS
Qty: 9 ML | Refills: 5 | Status: SHIPPED | OUTPATIENT
Start: 2017-12-01 | End: 2018-01-03 | Stop reason: SDUPTHER

## 2017-12-14 DIAGNOSIS — E78.1 FAMILIAL HYPERTRIGLYCERIDEMIA: ICD-10-CM

## 2017-12-14 DIAGNOSIS — E11.69 HYPERLIPIDEMIA ASSOCIATED WITH TYPE 2 DIABETES MELLITUS (HCC): Primary | ICD-10-CM

## 2017-12-14 DIAGNOSIS — E29.1 MALE HYPOGONADISM: ICD-10-CM

## 2017-12-14 DIAGNOSIS — E78.5 HYPERLIPIDEMIA ASSOCIATED WITH TYPE 2 DIABETES MELLITUS (HCC): Primary | ICD-10-CM

## 2017-12-14 DIAGNOSIS — E11.9 CONTROLLED TYPE 2 DIABETES MELLITUS WITHOUT COMPLICATION, WITHOUT LONG-TERM CURRENT USE OF INSULIN (HCC): ICD-10-CM

## 2017-12-14 DIAGNOSIS — E03.9 ACQUIRED HYPOTHYROIDISM: ICD-10-CM

## 2017-12-14 DIAGNOSIS — R79.89 LOW TESTOSTERONE: ICD-10-CM

## 2017-12-21 ENCOUNTER — LAB (OUTPATIENT)
Dept: ENDOCRINOLOGY | Age: 62
End: 2017-12-21

## 2017-12-21 DIAGNOSIS — E11.9 CONTROLLED TYPE 2 DIABETES MELLITUS WITHOUT COMPLICATION, WITHOUT LONG-TERM CURRENT USE OF INSULIN (HCC): ICD-10-CM

## 2017-12-21 DIAGNOSIS — E11.69 HYPERLIPIDEMIA ASSOCIATED WITH TYPE 2 DIABETES MELLITUS (HCC): ICD-10-CM

## 2017-12-21 DIAGNOSIS — E03.9 ACQUIRED HYPOTHYROIDISM: ICD-10-CM

## 2017-12-21 DIAGNOSIS — R79.89 LOW TESTOSTERONE: ICD-10-CM

## 2017-12-21 DIAGNOSIS — E78.1 FAMILIAL HYPERTRIGLYCERIDEMIA: ICD-10-CM

## 2017-12-21 DIAGNOSIS — E29.1 MALE HYPOGONADISM: ICD-10-CM

## 2017-12-21 DIAGNOSIS — E78.5 HYPERLIPIDEMIA ASSOCIATED WITH TYPE 2 DIABETES MELLITUS (HCC): ICD-10-CM

## 2017-12-21 RX ORDER — TESTOSTERONE 16.2 MG/G
GEL TRANSDERMAL
Qty: 75 G | Refills: 0 | Status: CANCELLED | OUTPATIENT
Start: 2017-12-21

## 2017-12-24 LAB
25(OH)D3+25(OH)D2 SERPL-MCNC: 39.1 NG/ML (ref 30–100)
ALBUMIN SERPL-MCNC: 4.2 G/DL (ref 3.5–5.2)
ALBUMIN/GLOB SERPL: 1.6 G/DL
ALP SERPL-CCNC: 56 U/L (ref 39–117)
ALT SERPL-CCNC: 23 U/L (ref 1–41)
AST SERPL-CCNC: 23 U/L (ref 1–40)
BILIRUB SERPL-MCNC: 0.2 MG/DL (ref 0.1–1.2)
BUN SERPL-MCNC: 30 MG/DL (ref 8–23)
BUN/CREAT SERPL: 17 (ref 7–25)
C PEPTIDE SERPL-MCNC: 8.4 NG/ML (ref 1.1–4.4)
CALCIUM SERPL-MCNC: 9.2 MG/DL (ref 8.6–10.5)
CHLORIDE SERPL-SCNC: 100 MMOL/L (ref 98–107)
CHOLEST SERPL-MCNC: 124 MG/DL (ref 0–200)
CO2 SERPL-SCNC: 23.1 MMOL/L (ref 22–29)
CREAT SERPL-MCNC: 1.76 MG/DL (ref 0.76–1.27)
ERYTHROCYTE [DISTWIDTH] IN BLOOD BY AUTOMATED COUNT: 15 % (ref 11.5–14.5)
GLOBULIN SER CALC-MCNC: 2.7 GM/DL
GLUCOSE SERPL-MCNC: 284 MG/DL (ref 65–99)
HBA1C MFR BLD: 7.3 % (ref 4.8–5.6)
HCT VFR BLD AUTO: 41 % (ref 40.4–52.2)
HDLC SERPL-MCNC: 38 MG/DL (ref 40–60)
HGB BLD-MCNC: 13.2 G/DL (ref 13.7–17.6)
INTERPRETATION: NORMAL
LDLC SERPL CALC-MCNC: ABNORMAL MG/DL
Lab: NORMAL
MCH RBC QN AUTO: 28.5 PG (ref 27–32.7)
MCHC RBC AUTO-ENTMCNC: 32.2 G/DL (ref 32.6–36.4)
MCV RBC AUTO: 88.6 FL (ref 79.8–96.2)
MICROALBUMIN UR-MCNC: 7.9 UG/ML
PLATELET # BLD AUTO: 234 10*3/MM3 (ref 140–500)
POTASSIUM SERPL-SCNC: 4.5 MMOL/L (ref 3.5–5.2)
PROT SERPL-MCNC: 6.9 G/DL (ref 6–8.5)
RBC # BLD AUTO: 4.63 10*6/MM3 (ref 4.6–6)
SHBG SERPL-SCNC: 27.1 NMOL/L (ref 19.3–76.4)
SODIUM SERPL-SCNC: 140 MMOL/L (ref 136–145)
T3FREE SERPL-MCNC: 2.6 PG/ML (ref 2–4.4)
T4 FREE SERPL-MCNC: 1.25 NG/DL (ref 0.93–1.7)
T4 SERPL-MCNC: 6.12 MCG/DL (ref 4.5–11.7)
TESTOST FREE SERPL-MCNC: 14.7 PG/ML (ref 6.6–18.1)
TESTOST SERPL-MCNC: 416 NG/DL (ref 264–916)
THYROGLOB AB SERPL-ACNC: <1 IU/ML
THYROGLOB SERPL-MCNC: 3.2 NG/ML
THYROGLOB SERPL-MCNC: NORMAL NG/ML
TRIGL SERPL-MCNC: 527 MG/DL (ref 0–150)
TSH SERPL DL<=0.005 MIU/L-ACNC: 2.23 MIU/ML (ref 0.27–4.2)
URATE SERPL-MCNC: 5.7 MG/DL (ref 3.4–7)
VLDLC SERPL CALC-MCNC: ABNORMAL MG/DL
WBC # BLD AUTO: 6.91 10*3/MM3 (ref 4.5–10.7)

## 2017-12-26 DIAGNOSIS — E29.1 MALE HYPOGONADISM: ICD-10-CM

## 2017-12-26 RX ORDER — TESTOSTERONE 16.2 MG/G
2 GEL TRANSDERMAL DAILY
Qty: 81 G | Refills: 0 | OUTPATIENT
Start: 2017-12-26 | End: 2018-01-03 | Stop reason: SDUPTHER

## 2018-01-03 ENCOUNTER — OFFICE VISIT (OUTPATIENT)
Dept: ENDOCRINOLOGY | Age: 63
End: 2018-01-03

## 2018-01-03 VITALS
DIASTOLIC BLOOD PRESSURE: 74 MMHG | SYSTOLIC BLOOD PRESSURE: 130 MMHG | BODY MASS INDEX: 32 KG/M2 | RESPIRATION RATE: 16 BRPM | WEIGHT: 256 LBS

## 2018-01-03 DIAGNOSIS — E11.69 HYPERLIPIDEMIA ASSOCIATED WITH TYPE 2 DIABETES MELLITUS (HCC): ICD-10-CM

## 2018-01-03 DIAGNOSIS — E03.9 ACQUIRED HYPOTHYROIDISM: Primary | ICD-10-CM

## 2018-01-03 DIAGNOSIS — IMO0002 UNCONTROLLED TYPE 2 DIABETES MELLITUS WITH COMPLICATION, WITHOUT LONG-TERM CURRENT USE OF INSULIN: ICD-10-CM

## 2018-01-03 DIAGNOSIS — E78.2 MIXED HYPERLIPIDEMIA: ICD-10-CM

## 2018-01-03 DIAGNOSIS — E78.5 HYPERLIPIDEMIA ASSOCIATED WITH TYPE 2 DIABETES MELLITUS (HCC): ICD-10-CM

## 2018-01-03 DIAGNOSIS — R79.89 LOW TESTOSTERONE: ICD-10-CM

## 2018-01-03 DIAGNOSIS — I10 BENIGN ESSENTIAL HYPERTENSION: ICD-10-CM

## 2018-01-03 DIAGNOSIS — E29.1 MALE HYPOGONADISM: ICD-10-CM

## 2018-01-03 PROCEDURE — 99214 OFFICE O/P EST MOD 30 MIN: CPT | Performed by: INTERNAL MEDICINE

## 2018-01-03 RX ORDER — TESTOSTERONE 16.2 MG/G
2 GEL TRANSDERMAL DAILY
Qty: 150 G | Refills: 5 | Status: SHIPPED | OUTPATIENT
Start: 2018-01-03 | End: 2018-01-19 | Stop reason: SDUPTHER

## 2018-01-03 NOTE — PROGRESS NOTES
Subjective   Kem Alvarado is a 62 y.o. male seen for follow up for DM2, hyperlipidemia, vit d deficiency, lab review. Patient is checking BG twice a day. No BG readings. Patient is having fatigue and a lower level of energy than normal. He states that he got home yesterday and went to bed at 9:30PM and slept thru the night and is still feeling tired this morning.     History of Present Illness this is a 62-year-old gentleman known patient with type II diabetes hypertension and dyslipidemia as well as hypogonadism and vitamin D deficiency and hyperuricemia.  Over the course of last 4 months he has had no significant health problems for which to go to the emergency room or hospital.  He has been using AndroGel 1 pump actuation on each shoulder.  Additionally because of holidays he is expecting to have a little higher hemoglobin A1c level.    /74  Resp 16  Wt 116 kg (256 lb)  BMI 32 kg/m2     No Known Allergies    Current Outpatient Prescriptions:   •  allopurinol (ZYLOPRIM) 100 MG tablet, Take 1 tablet by mouth Daily., Disp: 30 tablet, Rfl: 5  •  amLODIPine (NORVASC) 5 MG tablet, Take 1 tablet by mouth 2 (Two) Times a Day., Disp: 60 tablet, Rfl: 11  •  ELIQUIS 5 MG tablet tablet, TAKE ONE TABLET BY MOUTH TWO TIMES A DAY., Disp: 60 tablet, Rfl: 11  •  ergocalciferol (DRISDOL) 93786 UNITS capsule, Take 1 capsule by mouth 1 (One) Time Per Week., Disp: 13 capsule, Rfl: 3  •  fenofibrate (TRICOR) 145 MG tablet, Take 1 tablet by mouth Daily., Disp: 30 tablet, Rfl: 5  •  glucose blood (ACCU-CHEK MRAIA L) test strip, Check blood sugar 4-5 times daily, Disp: 200 each, Rfl: 5  •  Insulin Glargine (LANTUS SOLOSTAR) 100 UNIT/ML injection pen, 40 units daily every 3 days if fasting blood sugar is greater than 110 as 2 units up to 50 units, Disp: 5 pen, Rfl: 5  •  Insulin Pen Needle (PEN NEEDLES) 31G X 6 MM misc, 5-6 injections daily, Disp: 200 each, Rfl: 3  •  levothyroxine (SYNTHROID) 75 MCG tablet, Take 1 tablet by  mouth Daily., Disp: 30 tablet, Rfl: 5  •  metoprolol tartrate (LOPRESSOR) 50 MG tablet, Take 1 tablet by mouth 2 (Two) Times a Day., Disp: 60 tablet, Rfl: 5  •  nateglinide (STARLIX) 120 MG tablet, Take 1 tablet by mouth 3 (Three) Times a Day Before Meals., Disp: 90 tablet, Rfl: 5  •  rosuvastatin (CRESTOR) 20 MG tablet, Take 1 tablet by mouth Every Night., Disp: 30 tablet, Rfl: 5  •  Testosterone (ANDROGEL PUMP) 20.25 MG/ACT (1.62%) gel, Place 2 applicators on the skin Daily. Place 2 pumps to upper chest, shoulder daily, Disp: 81 g, Rfl: 0  •  VASCEPA 1 g capsule capsule, Take 2 g by mouth 2 (Two) Times a Day With Meals., Disp: 120 capsule, Rfl: 3  •  VICTOZA 18 MG/3ML solution pen-injector, 0.6 mg a day for 7 days 1.2 mg daily for 7 days 1.8 mg maintenance daily, Disp: 3 pen, Rfl: 5      The following portions of the patient's history were reviewed and updated as appropriate: allergies, current medications, past family history, past medical history, past social history, past surgical history and problem list.    Review of Systems   Constitutional: Positive for fatigue.   HENT: Negative.    Eyes: Negative.    Respiratory: Negative.    Cardiovascular: Negative.  Negative for chest pain.   Gastrointestinal: Negative.    Endocrine: Negative.  Negative for polydipsia, polyphagia and polyuria.   Genitourinary: Negative.    Musculoskeletal: Negative.    Skin: Negative.  Negative for pallor.   Allergic/Immunologic: Negative.    Neurological: Negative.  Negative for dizziness, tremors, seizures, speech difficulty, weakness and headaches.   Hematological: Negative.    Psychiatric/Behavioral: Negative.  Negative for confusion. The patient is not nervous/anxious.        Objective   Physical Exam   Constitutional: He is oriented to person, place, and time. He appears well-developed and well-nourished. No distress.   HENT:   Head: Normocephalic and atraumatic.   Right Ear: External ear normal.   Left Ear: External ear normal.    Nose: Nose normal.   Mouth/Throat: Oropharynx is clear and moist. No oropharyngeal exudate.   Eyes: Conjunctivae and EOM are normal. Pupils are equal, round, and reactive to light. Right eye exhibits no discharge. Left eye exhibits no discharge. No scleral icterus.   Neck: Normal range of motion. Neck supple. No JVD present. No tracheal deviation present. No thyromegaly present.   Cardiovascular: Normal rate, regular rhythm, normal heart sounds and intact distal pulses.  Exam reveals no gallop and no friction rub.    No murmur heard.  Pulmonary/Chest: Effort normal and breath sounds normal. No stridor. No respiratory distress. He has no wheezes. He has no rales. He exhibits no tenderness.   Abdominal: Soft. Bowel sounds are normal. He exhibits no distension and no mass. There is no tenderness. There is no rebound and no guarding. No hernia.   Musculoskeletal: Normal range of motion. He exhibits no edema, tenderness or deformity.   Lymphadenopathy:     He has no cervical adenopathy.   Neurological: He is alert and oriented to person, place, and time. He has normal reflexes. He displays normal reflexes. No cranial nerve deficit. He exhibits normal muscle tone. Coordination normal.   Skin: Skin is warm and dry. No rash noted. He is not diaphoretic. No erythema. No pallor.   Psychiatric: He has a normal mood and affect. His behavior is normal. Judgment and thought content normal.   Nursing note and vitals reviewed.    Results for orders placed or performed in visit on 12/21/17   Comprehensive Metabolic Panel   Result Value Ref Range    Glucose 284 (H) 65 - 99 mg/dL    BUN 30 (H) 8 - 23 mg/dL    Creatinine 1.76 (H) 0.76 - 1.27 mg/dL    eGFR Non African Am 40 (L) >60 mL/min/1.73    eGFR  Am 48 (L) >60 mL/min/1.73    BUN/Creatinine Ratio 17.0 7.0 - 25.0    Sodium 140 136 - 145 mmol/L    Potassium 4.5 3.5 - 5.2 mmol/L    Chloride 100 98 - 107 mmol/L    Total CO2 23.1 22.0 - 29.0 mmol/L    Calcium 9.2 8.6 - 10.5  mg/dL    Total Protein 6.9 6.0 - 8.5 g/dL    Albumin 4.20 3.50 - 5.20 g/dL    Globulin 2.7 gm/dL    A/G Ratio 1.6 g/dL    Total Bilirubin 0.2 0.1 - 1.2 mg/dL    Alkaline Phosphatase 56 39 - 117 U/L    AST (SGOT) 23 1 - 40 U/L    ALT (SGPT) 23 1 - 41 U/L   C-Peptide   Result Value Ref Range    C-Peptide 8.4 (H) 1.1 - 4.4 ng/mL   Hemoglobin A1c   Result Value Ref Range    Hemoglobin A1C 7.30 (H) 4.80 - 5.60 %   Lipid Panel   Result Value Ref Range    Total Cholesterol 124 0 - 200 mg/dL    Triglycerides 527 (H) 0 - 150 mg/dL    HDL Cholesterol 38 (L) 40 - 60 mg/dL    VLDL Cholesterol CANCELED mg/dL    LDL Cholesterol  CANCELED mg/dL   Uric Acid   Result Value Ref Range    Uric Acid 5.7 3.4 - 7.0 mg/dL   Vitamin D 25 Hydroxy   Result Value Ref Range    25 Hydroxy, Vitamin D 39.1 30.0 - 100.0 ng/mL   T4, Free   Result Value Ref Range    Free T4 1.25 0.93 - 1.70 ng/dL   T4 & TSH ([facility])   Result Value Ref Range    TSH 2.230 0.270 - 4.200 mIU/mL    T4, Total 6.12 4.50 - 11.70 mcg/dL   T3, Free   Result Value Ref Range    T3, Free 2.6 2.0 - 4.4 pg/mL   Comprehensive Thyroglobulin   Result Value Ref Range    Thyroglobulin Ab <1.0 IU/mL    Thyroglobulin 3.2 ng/mL    Thyroglobulin (TG-ESTRELLA) Comment ng/mL   TestT+TestF+SHBG   Result Value Ref Range    Testosterone, Total 416 264 - 916 ng/dL    Testosterone, Free 14.7 6.6 - 18.1 pg/mL    Sex Hormone Binding Globulin 27.1 19.3 - 76.4 nmol/L   CBC (No Diff)   Result Value Ref Range    WBC 6.91 4.50 - 10.70 10*3/mm3    RBC 4.63 4.60 - 6.00 10*6/mm3    Hemoglobin 13.2 (L) 13.7 - 17.6 g/dL    Hematocrit 41.0 40.4 - 52.2 %    MCV 88.6 79.8 - 96.2 fL    MCH 28.5 27.0 - 32.7 pg    MCHC 32.2 (L) 32.6 - 36.4 g/dL    RDW 15.0 (H) 11.5 - 14.5 %    Platelets 234 140 - 500 10*3/mm3   MicroAlbumin, Urine, Random   Result Value Ref Range    Microalbumin, Urine 7.9 Not Estab. ug/mL   Cardiovascular Risk Assessment   Result Value Ref Range    Interpretation Note    Diabetes Patient Education    Result Value Ref Range    PDF Image Not applicable          Assessment/Plan   Diagnoses and all orders for this visit:    Acquired hypothyroidism  -     T3, Free; Future  -     T4 & TSH ([facility]); Future  -     T4, Free; Future  -     TestT+TestF+SHBG; Future  -     Uric Acid; Future  -     Vitamin D 25 Hydroxy; Future  -     Comprehensive Metabolic Panel; Future  -     C-Peptide; Future  -     Hemoglobin A1c; Future  -     Lipid Panel; Future  -     MicroAlbumin, Urine, Random - Urine, Clean Catch; Future  -     PSA DIAGNOSTIC; Future  -     Hemoglobin & Hematocrit, Blood; Future    Low testosterone  -     T3, Free; Future  -     T4 & TSH ([facility]); Future  -     T4, Free; Future  -     TestT+TestF+SHBG; Future  -     Uric Acid; Future  -     Vitamin D 25 Hydroxy; Future  -     Comprehensive Metabolic Panel; Future  -     C-Peptide; Future  -     Hemoglobin A1c; Future  -     Lipid Panel; Future  -     MicroAlbumin, Urine, Random - Urine, Clean Catch; Future  -     PSA DIAGNOSTIC; Future  -     Hemoglobin & Hematocrit, Blood; Future    Male hypogonadism  -     ANDROGEL PUMP 20.25 MG/ACT (1.62%) gel; Place 2 applicators on the skin Daily. Place 2 pumps to upper chest, shoulder daily  -     T3, Free; Future  -     T4 & TSH ([facility]); Future  -     T4, Free; Future  -     TestT+TestF+SHBG; Future  -     Uric Acid; Future  -     Vitamin D 25 Hydroxy; Future  -     Comprehensive Metabolic Panel; Future  -     C-Peptide; Future  -     Hemoglobin A1c; Future  -     Lipid Panel; Future  -     MicroAlbumin, Urine, Random - Urine, Clean Catch; Future  -     PSA DIAGNOSTIC; Future  -     Hemoglobin & Hematocrit, Blood; Future    Benign essential hypertension  -     T3, Free; Future  -     T4 & TSH ([facility]); Future  -     T4, Free; Future  -     TestT+TestF+SHBG; Future  -     Uric Acid; Future  -     Vitamin D 25 Hydroxy; Future  -     Comprehensive Metabolic Panel; Future  -     C-Peptide; Future  -     Hemoglobin A1c;  Future  -     Lipid Panel; Future  -     MicroAlbumin, Urine, Random - Urine, Clean Catch; Future  -     PSA DIAGNOSTIC; Future  -     Hemoglobin & Hematocrit, Blood; Future    Hyperlipidemia associated with type 2 diabetes mellitus  -     T3, Free; Future  -     T4 & TSH ([facility]); Future  -     T4, Free; Future  -     TestT+TestF+SHBG; Future  -     Uric Acid; Future  -     Vitamin D 25 Hydroxy; Future  -     Comprehensive Metabolic Panel; Future  -     C-Peptide; Future  -     Hemoglobin A1c; Future  -     Lipid Panel; Future  -     MicroAlbumin, Urine, Random - Urine, Clean Catch; Future  -     PSA DIAGNOSTIC; Future  -     Hemoglobin & Hematocrit, Blood; Future    Mixed hyperlipidemia  -     T3, Free; Future  -     T4 & TSH ([facility]); Future  -     T4, Free; Future  -     TestT+TestF+SHBG; Future  -     Uric Acid; Future  -     Vitamin D 25 Hydroxy; Future  -     Comprehensive Metabolic Panel; Future  -     C-Peptide; Future  -     Hemoglobin A1c; Future  -     Lipid Panel; Future  -     MicroAlbumin, Urine, Random - Urine, Clean Catch; Future  -     PSA DIAGNOSTIC; Future  -     Hemoglobin & Hematocrit, Blood; Future    Uncontrolled type 2 diabetes mellitus with complication, without long-term current use of insulin  -     T3, Free; Future  -     T4 & TSH ([facility]); Future  -     T4, Free; Future  -     TestT+TestF+SHBG; Future  -     Uric Acid; Future  -     Vitamin D 25 Hydroxy; Future  -     Comprehensive Metabolic Panel; Future  -     C-Peptide; Future  -     Hemoglobin A1c; Future  -     Lipid Panel; Future  -     MicroAlbumin, Urine, Random - Urine, Clean Catch; Future  -     PSA DIAGNOSTIC; Future  -     Hemoglobin & Hematocrit, Blood; Future               In summary I saw and examined this 62-year-old gentleman for above-mentioned problems.  I reviewed his laboratory evaluation of 12/21/2017 and provided him with a hard copy of it.  Considering the effect of holidays he is clinically and metabolically  stable and promised to follow instructions as far as diet and exercise more closely.  He will see Ms. Analia Bang in 4 months or sooner if needed with laboratory evaluation prior to each office visit.

## 2018-01-09 DIAGNOSIS — E03.9 ACQUIRED HYPOTHYROIDISM: ICD-10-CM

## 2018-01-09 DIAGNOSIS — E78.2 MIXED HYPERLIPIDEMIA: ICD-10-CM

## 2018-01-10 RX ORDER — LEVOTHYROXINE SODIUM 0.07 MG/1
TABLET ORAL
Qty: 30 TABLET | Refills: 5 | Status: SHIPPED | OUTPATIENT
Start: 2018-01-10 | End: 2018-05-10 | Stop reason: SDUPTHER

## 2018-01-10 RX ORDER — ROSUVASTATIN CALCIUM 20 MG/1
TABLET, COATED ORAL
Qty: 30 TABLET | Refills: 5 | Status: SHIPPED | OUTPATIENT
Start: 2018-01-10 | End: 2018-05-10 | Stop reason: SDUPTHER

## 2018-01-19 ENCOUNTER — TELEPHONE (OUTPATIENT)
Dept: ENDOCRINOLOGY | Age: 63
End: 2018-01-19

## 2018-01-19 DIAGNOSIS — E29.1 MALE HYPOGONADISM: ICD-10-CM

## 2018-01-19 DIAGNOSIS — E78.2 MIXED HYPERLIPIDEMIA: ICD-10-CM

## 2018-01-19 RX ORDER — FENOFIBRATE 145 MG/1
TABLET, COATED ORAL
Qty: 30 TABLET | Refills: 5 | Status: SHIPPED | OUTPATIENT
Start: 2018-01-19 | End: 2018-05-10 | Stop reason: SDUPTHER

## 2018-01-19 RX ORDER — ALLOPURINOL 100 MG/1
TABLET ORAL
Qty: 30 TABLET | Refills: 5 | Status: SHIPPED | OUTPATIENT
Start: 2018-01-19 | End: 2018-05-10 | Stop reason: SDUPTHER

## 2018-01-19 RX ORDER — TESTOSTERONE 16.2 MG/G
GEL TRANSDERMAL
Qty: 150 G | Refills: 5 | OUTPATIENT
Start: 2018-01-19 | End: 2018-08-22

## 2018-01-19 NOTE — TELEPHONE ENCOUNTER
----- Message from Arline Bruce sent at 1/18/2018 12:54 PM EST -----  Contact: [phone]  [facility] - [phone]    Ask for Royal       Needs Prescription clarification    Androgel  Shoulder or upper chest. They aren't sure if it's one or the other.      ANDROGEL PUMP 20.25 MG/ACT (1.62%) gel 150 g 5 1/3/2018       Sig - Route: Place 2 applicators on the skin Daily. Place 2 pumps to upper chest, shoulder daily - Transdermal     Class: Print       Spoke to pharmacy and clarified directions for Androgel.

## 2018-01-22 ENCOUNTER — PRIOR AUTHORIZATION (OUTPATIENT)
Dept: ENDOCRINOLOGY | Age: 63
End: 2018-01-22

## 2018-01-22 NOTE — TELEPHONE ENCOUNTER
Pt's androgel pa has been started on 1/22/18. PA was approved on 1/29/18. The pharmacy was notified.

## 2018-01-23 DIAGNOSIS — E78.1 FAMILIAL HYPERTRIGLYCERIDEMIA: ICD-10-CM

## 2018-01-24 RX ORDER — ICOSAPENT ETHYL 1000 MG/1
CAPSULE ORAL
Qty: 120 CAPSULE | Refills: 3 | Status: SHIPPED | OUTPATIENT
Start: 2018-01-24 | End: 2018-05-10 | Stop reason: SDUPTHER

## 2018-03-20 ENCOUNTER — PRIOR AUTHORIZATION (OUTPATIENT)
Dept: ENDOCRINOLOGY | Age: 63
End: 2018-03-20

## 2018-03-20 RX ORDER — ERGOCALCIFEROL 1.25 MG/1
CAPSULE ORAL
Qty: 13 CAPSULE | Refills: 3 | Status: SHIPPED | OUTPATIENT
Start: 2018-03-20 | End: 2018-05-10 | Stop reason: SDUPTHER

## 2018-03-20 NOTE — TELEPHONE ENCOUNTER
PT'S PA FOR LANTUS WAS SUBMITTED TO [facility] ON 3/20/18 AND WAS DENIED. THE ALTERNATIVES WERE SUBMITTED TO THE PROVIDER.

## 2018-04-23 DIAGNOSIS — E11.9 CONTROLLED TYPE 2 DIABETES MELLITUS WITHOUT COMPLICATION, WITHOUT LONG-TERM CURRENT USE OF INSULIN (HCC): ICD-10-CM

## 2018-04-24 ENCOUNTER — PRIOR AUTHORIZATION (OUTPATIENT)
Dept: ENDOCRINOLOGY | Age: 63
End: 2018-04-24

## 2018-04-24 ENCOUNTER — RESULTS ENCOUNTER (OUTPATIENT)
Dept: ENDOCRINOLOGY | Age: 63
End: 2018-04-24

## 2018-04-24 DIAGNOSIS — E11.69 HYPERLIPIDEMIA ASSOCIATED WITH TYPE 2 DIABETES MELLITUS (HCC): ICD-10-CM

## 2018-04-24 DIAGNOSIS — R79.89 LOW TESTOSTERONE: ICD-10-CM

## 2018-04-24 DIAGNOSIS — IMO0002 UNCONTROLLED TYPE 2 DIABETES MELLITUS WITH COMPLICATION, WITHOUT LONG-TERM CURRENT USE OF INSULIN: ICD-10-CM

## 2018-04-24 DIAGNOSIS — E78.5 HYPERLIPIDEMIA ASSOCIATED WITH TYPE 2 DIABETES MELLITUS (HCC): ICD-10-CM

## 2018-04-24 DIAGNOSIS — E29.1 MALE HYPOGONADISM: ICD-10-CM

## 2018-04-24 DIAGNOSIS — E03.9 ACQUIRED HYPOTHYROIDISM: ICD-10-CM

## 2018-04-24 DIAGNOSIS — E78.2 MIXED HYPERLIPIDEMIA: ICD-10-CM

## 2018-04-24 DIAGNOSIS — I10 BENIGN ESSENTIAL HYPERTENSION: ICD-10-CM

## 2018-04-24 RX ORDER — LIRAGLUTIDE 6 MG/ML
INJECTION SUBCUTANEOUS
Qty: 9 ML | Refills: 5 | Status: SHIPPED | OUTPATIENT
Start: 2018-04-24 | End: 2018-05-10 | Stop reason: SDUPTHER

## 2018-04-24 NOTE — TELEPHONE ENCOUNTER
PA FOR VICTOZA WAS SUBMITTED TO [facility] ON 4/24/18 AND WAS APPROVED ON 4/25/18. PHARMACY WAS NOTIFIED.

## 2018-04-26 LAB
25(OH)D3+25(OH)D2 SERPL-MCNC: 45.1 NG/ML (ref 30–100)
ALBUMIN SERPL-MCNC: 4.2 G/DL (ref 3.5–5.2)
ALBUMIN/GLOB SERPL: 1.6 G/DL
ALP SERPL-CCNC: 48 U/L (ref 39–117)
ALT SERPL-CCNC: 29 U/L (ref 1–41)
AST SERPL-CCNC: 35 U/L (ref 1–40)
BILIRUB SERPL-MCNC: 0.3 MG/DL (ref 0.1–1.2)
BUN SERPL-MCNC: 27 MG/DL (ref 8–23)
BUN/CREAT SERPL: 14.3 (ref 7–25)
C PEPTIDE SERPL-MCNC: 6.9 NG/ML (ref 1.1–4.4)
CALCIUM SERPL-MCNC: 9.4 MG/DL (ref 8.6–10.5)
CHLORIDE SERPL-SCNC: 102 MMOL/L (ref 98–107)
CHOLEST SERPL-MCNC: 144 MG/DL (ref 0–200)
CO2 SERPL-SCNC: 23.8 MMOL/L (ref 22–29)
CREAT SERPL-MCNC: 1.89 MG/DL (ref 0.76–1.27)
GFR SERPLBLD CREATININE-BSD FMLA CKD-EPI: 36 ML/MIN/1.73
GFR SERPLBLD CREATININE-BSD FMLA CKD-EPI: 44 ML/MIN/1.73
GLOBULIN SER CALC-MCNC: 2.7 GM/DL
GLUCOSE SERPL-MCNC: 215 MG/DL (ref 65–99)
HBA1C MFR BLD: 8.4 % (ref 4.8–5.6)
HCT VFR BLD AUTO: 42.9 % (ref 40.4–52.2)
HDLC SERPL-MCNC: 30 MG/DL (ref 40–60)
HGB BLD-MCNC: 14.1 G/DL (ref 13.7–17.6)
INTERPRETATION: NORMAL
LDLC SERPL CALC-MCNC: ABNORMAL MG/DL
Lab: NORMAL
POTASSIUM SERPL-SCNC: 4.7 MMOL/L (ref 3.5–5.2)
PROT SERPL-MCNC: 6.9 G/DL (ref 6–8.5)
PSA SERPL-MCNC: 0.29 NG/ML (ref 0–4)
SHBG SERPL-SCNC: 23.8 NMOL/L (ref 19.3–76.4)
SODIUM SERPL-SCNC: 138 MMOL/L (ref 136–145)
T3FREE SERPL-MCNC: 2.7 PG/ML (ref 2–4.4)
T4 FREE SERPL-MCNC: 1.36 NG/DL (ref 0.93–1.7)
T4 SERPL-MCNC: 6.88 MCG/DL (ref 4.5–11.7)
TESTOST FREE SERPL-MCNC: 7.2 PG/ML (ref 6.6–18.1)
TESTOST SERPL-MCNC: 275 NG/DL (ref 264–916)
TRIGL SERPL-MCNC: 456 MG/DL (ref 0–150)
TSH SERPL DL<=0.005 MIU/L-ACNC: 1.64 MIU/ML (ref 0.27–4.2)
URATE SERPL-MCNC: 5.8 MG/DL (ref 3.4–7)
VLDLC SERPL CALC-MCNC: ABNORMAL MG/DL

## 2018-05-07 DIAGNOSIS — E11.9 CONTROLLED TYPE 2 DIABETES MELLITUS WITHOUT COMPLICATION, WITHOUT LONG-TERM CURRENT USE OF INSULIN (HCC): ICD-10-CM

## 2018-05-08 RX ORDER — LIRAGLUTIDE 6 MG/ML
INJECTION SUBCUTANEOUS
Qty: 9 ML | Refills: 0 | Status: SHIPPED | OUTPATIENT
Start: 2018-05-08 | End: 2018-05-10

## 2018-05-10 ENCOUNTER — OFFICE VISIT (OUTPATIENT)
Dept: ENDOCRINOLOGY | Age: 63
End: 2018-05-10

## 2018-05-10 VITALS
BODY MASS INDEX: 32.08 KG/M2 | SYSTOLIC BLOOD PRESSURE: 140 MMHG | HEIGHT: 75 IN | DIASTOLIC BLOOD PRESSURE: 82 MMHG | WEIGHT: 258 LBS

## 2018-05-10 DIAGNOSIS — E29.1 MALE HYPOGONADISM: ICD-10-CM

## 2018-05-10 DIAGNOSIS — E78.5 HYPERLIPIDEMIA ASSOCIATED WITH TYPE 2 DIABETES MELLITUS (HCC): ICD-10-CM

## 2018-05-10 DIAGNOSIS — E78.1 FAMILIAL HYPERTRIGLYCERIDEMIA: ICD-10-CM

## 2018-05-10 DIAGNOSIS — E78.2 MIXED HYPERLIPIDEMIA: ICD-10-CM

## 2018-05-10 DIAGNOSIS — N18.30 CHRONIC KIDNEY DISEASE, STAGE III (MODERATE) (HCC): ICD-10-CM

## 2018-05-10 DIAGNOSIS — E55.9 VITAMIN D DEFICIENCY: ICD-10-CM

## 2018-05-10 DIAGNOSIS — E11.9 CONTROLLED TYPE 2 DIABETES MELLITUS WITHOUT COMPLICATION, WITHOUT LONG-TERM CURRENT USE OF INSULIN (HCC): Primary | ICD-10-CM

## 2018-05-10 DIAGNOSIS — E03.9 ACQUIRED HYPOTHYROIDISM: ICD-10-CM

## 2018-05-10 DIAGNOSIS — E11.69 HYPERLIPIDEMIA ASSOCIATED WITH TYPE 2 DIABETES MELLITUS (HCC): ICD-10-CM

## 2018-05-10 DIAGNOSIS — I10 BENIGN ESSENTIAL HYPERTENSION: ICD-10-CM

## 2018-05-10 PROCEDURE — 99214 OFFICE O/P EST MOD 30 MIN: CPT | Performed by: NURSE PRACTITIONER

## 2018-05-10 RX ORDER — ERGOCALCIFEROL 1.25 MG/1
50000 CAPSULE ORAL 2 TIMES WEEKLY
Qty: 36 CAPSULE | Refills: 3 | Status: SHIPPED | OUTPATIENT
Start: 2018-05-10 | End: 2018-08-22 | Stop reason: SDUPTHER

## 2018-05-10 RX ORDER — ROSUVASTATIN CALCIUM 20 MG/1
20 TABLET, COATED ORAL NIGHTLY
Qty: 30 TABLET | Refills: 5 | Status: SHIPPED | OUTPATIENT
Start: 2018-05-10 | End: 2018-08-22 | Stop reason: SDUPTHER

## 2018-05-10 RX ORDER — LEVOTHYROXINE SODIUM 0.07 MG/1
75 TABLET ORAL DAILY
Qty: 30 TABLET | Refills: 5 | Status: SHIPPED | OUTPATIENT
Start: 2018-05-10 | End: 2018-08-22 | Stop reason: SDUPTHER

## 2018-05-10 RX ORDER — LIRAGLUTIDE 6 MG/ML
1.8 INJECTION SUBCUTANEOUS DAILY
Qty: 9 ML | Refills: 5 | Status: SHIPPED | OUTPATIENT
Start: 2018-05-10 | End: 2018-07-17 | Stop reason: SDUPTHER

## 2018-05-10 RX ORDER — NATEGLINIDE 120 MG/1
120 TABLET ORAL
Qty: 90 TABLET | Refills: 5 | Status: SHIPPED | OUTPATIENT
Start: 2018-05-10 | End: 2018-08-22 | Stop reason: SDUPTHER

## 2018-05-10 RX ORDER — FENOFIBRATE 145 MG/1
145 TABLET, COATED ORAL DAILY
Qty: 30 TABLET | Refills: 5 | Status: SHIPPED | OUTPATIENT
Start: 2018-05-10 | End: 2018-08-22 | Stop reason: SDUPTHER

## 2018-05-10 RX ORDER — ALLOPURINOL 100 MG/1
100 TABLET ORAL DAILY
Qty: 30 TABLET | Refills: 5 | Status: SHIPPED | OUTPATIENT
Start: 2018-05-10 | End: 2018-08-22 | Stop reason: SDUPTHER

## 2018-05-10 RX ORDER — ICOSAPENT ETHYL 1000 MG/1
2 CAPSULE ORAL 2 TIMES DAILY WITH MEALS
Qty: 120 CAPSULE | Refills: 3 | Status: SHIPPED | OUTPATIENT
Start: 2018-05-10 | End: 2018-08-22 | Stop reason: SDUPTHER

## 2018-05-10 NOTE — PROGRESS NOTES
Kem Alvarado  presents to the office  for the follow-up appointment for Type 2 diabetes mellitus.     The diabete's condition location is throughout the system with the  clinical course has fluctuated, the severity is Mild, and the modifying/allievating factors are insulin.  Medications and  Dosages were  reviewed with Kem Alvarado and suggested that compliance most of the time.    The patient reports associated symptoms of hyperglycemia have been headache and associated symptoms of hypoglycemia have been none, with their  hypoglycemia threshold for symptoms is n/a mg/dl .     The patient is currently on insulin.    Compliance with blood glucose monitoring: good.     Meal panning: The patient is using avoidance of concentrated sweets.    The patient is currently taking home blood tests - Blood glucose testin-1 times daily, that are:  fasting- 1st thing in morning before eating or drinking    Last instructions given were:  In summary I saw and examined this 62-year-old gentleman for above-mentioned problems.  I reviewed his laboratory evaluation of 2017 and provided him with a hard copy of it.  Considering the effect of holidays he is clinically and metabolically stable and promised to follow instructions as far as diet and exercise more closely.  He will see Ms. Analia Bang in 4 months or sooner if needed with laboratory evaluation prior to each office visit.    Home blood glucose testing daily: 0-1  FB to 180 mg/dl    Last reported blood glucose readings are:  None.    Patterns reported per patient are that his insurance will not cover lantus, so he has not been taking insulin since January. victozia 3 days- restarted         The following portions of the patient's history were reviewed and updated as appropriate: current medications, past family history, past medical history, past social history, past surgical history and problem list.      Current Outpatient Prescriptions:   •   allopurinol (ZYLOPRIM) 100 MG tablet, Take 1 tablet by mouth Daily., Disp: 30 tablet, Rfl: 5  •  amLODIPine (NORVASC) 5 MG tablet, Take 1 tablet by mouth 2 (Two) Times a Day., Disp: 60 tablet, Rfl: 11  •  ANDROGEL PUMP 20.25 MG/ACT (1.62%) gel, Place 2 pumps on each shoulder daily, Disp: 150 g, Rfl: 5  •  ELIQUIS 5 MG tablet tablet, TAKE ONE TABLET BY MOUTH TWO TIMES A DAY., Disp: 60 tablet, Rfl: 11  •  fenofibrate (TRICOR) 145 MG tablet, Take 1 tablet by mouth Daily., Disp: 30 tablet, Rfl: 5  •  glucose blood (ACCU-CHEK MARIA L) test strip, Check blood sugar 4-5 times daily, Disp: 200 each, Rfl: 5  •  Insulin Pen Needle (PEN NEEDLES) 31G X 6 MM misc, 5-6 injections daily, Disp: 200 each, Rfl: 3  •  levothyroxine (SYNTHROID, LEVOTHROID) 75 MCG tablet, Take 1 tablet by mouth Daily., Disp: 30 tablet, Rfl: 5  •  metoprolol tartrate (LOPRESSOR) 50 MG tablet, Take 1 tablet by mouth 2 (Two) Times a Day., Disp: 60 tablet, Rfl: 5  •  nateglinide (STARLIX) 120 MG tablet, Take 1 tablet by mouth 3 (Three) Times a Day Before Meals., Disp: 90 tablet, Rfl: 5  •  rosuvastatin (CRESTOR) 20 MG tablet, Take 1 tablet by mouth Every Night., Disp: 30 tablet, Rfl: 5  •  VASCEPA 1 g capsule capsule, Take 2 g by mouth 2 (Two) Times a Day With Meals., Disp: 120 capsule, Rfl: 3  •  VICTOZA 18 MG/3ML solution pen-injector injection, Inject 1.8 mg under the skin Daily., Disp: 9 mL, Rfl: 5  •  vitamin D (ERGOCALCIFEROL) 43994 units capsule capsule, Take 1 capsule by mouth 2 (Two) Times a Week., Disp: 36 capsule, Rfl: 3  •  Insulin Degludec 200 UNIT/ML solution pen-injector, Inject 30 Units under the skin Daily. Titrate as instructed with max of 100 units daily, Disp: 9 pen, Rfl: 4    Patient Active Problem List    Diagnosis   • Male hypogonadism [E29.1]   • Low testosterone [E34.9]   • Uncontrolled type 2 diabetes mellitus with complication, without long-term current use of insulin [E11.8, E11.65]   • Familial hypertriglyceridemia [E78.1]   •  "Mixed hyperlipidemia [E78.2]   • Hyperlipidemia associated with type 2 diabetes mellitus [E11.69, E78.5]   • Atrial flutter [I48.92]     Overview Note:     Description: Acute episode 9/15. Follows with Dr Fair     • Benign essential hypertension [I10]     Overview Note:     Description: Father  at 52 years of age with MI. Follows with Dr Fair.     • Chronic kidney disease, stage III (moderate) [N18.3]   • Controlled type 2 diabetes mellitus without complication [E11.9]     Overview Note:     Description: Onset after spell of pancreatitis in . Follows with Dr Curran. 3/15 Transfer diabetic care to this office.Follows annually with [facility] for eye exams.     • Hypothyroidism [E03.9]       Review of Systems   A comprehensive review of the 14 systems was negative except of listed below:  Constitutional: fatigue  Endocrine: hyperglycemia     Objective:     Wt Readings from Last 3 Encounters:   05/10/18 117 kg (258 lb)   18 116 kg (256 lb)   08/15/17 117 kg (258 lb)     Temp Readings from Last 3 Encounters:   17 97.5 °F (36.4 °C) (Oral)   10/03/16 97.8 °F (36.6 °C)   16 97.8 °F (36.6 °C) (Oral)     BP Readings from Last 3 Encounters:   05/10/18 140/82   18 130/74   08/15/17 130/82     Pulse Readings from Last 3 Encounters:   17 76   17 83   10/03/16 72        /82   Ht 190.5 cm (75\")   Wt 117 kg (258 lb)   BMI 32.25 kg/m²     General appearance:  alert, cooperative, delirious, fatigued, nourished\" \"appears stated age and cooperative\" \"NAD   Neck: no carotid bruit, supple, symmetrical, trachea midline and thyroid not enlarged, symmetric, no tenderness/mass/nodules   Thyroid:  no palpable nodule, no enlargement, no tenderness   Lung:  \"clear to auscultation bilaterally\" \"no abnormal breath sounds  \" effort was normal, no labored breath, no use of accessory muscles.   Heart: regular rate and rhythm, S1, S2 normal, no murmur, click, rub or gallop      Abdomen:  normal bowel " "sounds- 4 quads, soft non-tender and contour - slt rounded      Extremities: extremities normal, atraumatic, no cyanosis or edema extremities normal, atraumatic, no cyanosis or edema\" WNL - gait and station, strength and stability\"       Skin:   Pulses:  warm and dry, no hyperpigmentation, normal skin coloring, or suspicious lesions   2+ and symmetric   Neuro: Alert and oriented x3. Gait normal. Reflexes and motor strength normal and symmetric. Cranial nerves 2-12 and sensation grossly intact.      Psych: behavior - normal, judgement - normal, mood - normal, affect - normal                 Lab Review  Results for orders placed or performed in visit on 04/24/18   T3, Free   Result Value Ref Range    T3, Free 2.7 2.0 - 4.4 pg/mL   T4 & TSH ([facility])   Result Value Ref Range    TSH 1.640 0.270 - 4.200 mIU/mL    T4, Total 6.88 4.50 - 11.70 mcg/dL   T4, Free   Result Value Ref Range    Free T4 1.36 0.93 - 1.70 ng/dL   TestT+TestF+SHBG   Result Value Ref Range    Testosterone, Total 275 264 - 916 ng/dL    Testosterone, Free 7.2 6.6 - 18.1 pg/mL    Sex Hormone Binding Globulin 23.8 19.3 - 76.4 nmol/L   Uric Acid   Result Value Ref Range    Uric Acid 5.8 3.4 - 7.0 mg/dL   Vitamin D 25 Hydroxy   Result Value Ref Range    25 Hydroxy, Vitamin D 45.1 30.0 - 100.0 ng/ml   Comprehensive Metabolic Panel   Result Value Ref Range    Glucose 215 (H) 65 - 99 mg/dL    BUN 27 (H) 8 - 23 mg/dL    Creatinine 1.89 (H) 0.76 - 1.27 mg/dL    eGFR Non African Am 36 (L) >60 mL/min/1.73    eGFR African Am 44 (L) >60 mL/min/1.73    BUN/Creatinine Ratio 14.3 7.0 - 25.0    Sodium 138 136 - 145 mmol/L    Potassium 4.7 3.5 - 5.2 mmol/L    Chloride 102 98 - 107 mmol/L    Total CO2 23.8 22.0 - 29.0 mmol/L    Calcium 9.4 8.6 - 10.5 mg/dL    Total Protein 6.9 6.0 - 8.5 g/dL    Albumin 4.20 3.50 - 5.20 g/dL    Globulin 2.7 gm/dL    A/G Ratio 1.6 g/dL    Total Bilirubin 0.3 0.1 - 1.2 mg/dL    Alkaline Phosphatase 48 39 - 117 U/L    AST (SGOT) 35 1 - 40 U/L "    ALT (SGPT) 29 1 - 41 U/L   C-Peptide   Result Value Ref Range    C-Peptide 6.9 (H) 1.1 - 4.4 ng/mL   Hemoglobin A1c   Result Value Ref Range    Hemoglobin A1C 8.40 (H) 4.80 - 5.60 %   Lipid Panel   Result Value Ref Range    Total Cholesterol 144 0 - 200 mg/dL    Triglycerides 456 (H) 0 - 150 mg/dL    HDL Cholesterol 30 (L) 40 - 60 mg/dL    VLDL Cholesterol CANCELED mg/dL    LDL Cholesterol  CANCELED mg/dL   PSA DIAGNOSTIC   Result Value Ref Range    PSA 0.290 0.000 - 4.000 ng/mL   Hemoglobin & Hematocrit, Blood   Result Value Ref Range    Hemoglobin 14.1 13.7 - 17.6 g/dL    Hematocrit 42.9 40.4 - 52.2 %   Cardiovascular Risk Assessment   Result Value Ref Range    Interpretation Note    Diabetes Patient Education   Result Value Ref Range    PDF Image Not applicable            Assessment:   Kem was seen today for follow-up.    Diagnoses and all orders for this visit:    Controlled type 2 diabetes mellitus without complication, without long-term current use of insulin  -     Lipoprotein NMR  -     nateglinide (STARLIX) 120 MG tablet; Take 1 tablet by mouth 3 (Three) Times a Day Before Meals.  -     VICTOZA 18 MG/3ML solution pen-injector injection; Inject 1.8 mg under the skin Daily.  -     C-Peptide; Future  -     Comprehensive Metabolic Panel; Future  -     Hemoglobin A1c; Future  -     Insulin, Total; Future  -     Lipid Panel; Future  -     Microalbumin / Creatinine Urine Ratio - Urine, Clean Catch; Future  -     Uric Acid; Future  -     Vitamin D 25 Hydroxy; Future  -     TSH; Future  -     Thyroid Antibodies; Future  -     T4, Free; Future  -     T3, Free; Future  -     Testosterone; Future  -     Testosterone, Free, Total; Future  -     CBC & Differential; Future    Acquired hypothyroidism  -     Lipoprotein NMR  -     levothyroxine (SYNTHROID, LEVOTHROID) 75 MCG tablet; Take 1 tablet by mouth Daily.  -     Comprehensive Metabolic Panel; Future    Male hypogonadism  -     Lipoprotein NMR  -      Comprehensive Metabolic Panel; Future  -     Testosterone; Future  -     Testosterone, Free, Total; Future  -     CBC & Differential; Future    Benign essential hypertension  -     Lipoprotein NMR  -     Comprehensive Metabolic Panel; Future    Familial hypertriglyceridemia  -     Lipoprotein NMR  -     VASCEPA 1 g capsule capsule; Take 2 g by mouth 2 (Two) Times a Day With Meals.  -     Comprehensive Metabolic Panel; Future    Hyperlipidemia associated with type 2 diabetes mellitus  -     Lipoprotein NMR  -     Comprehensive Metabolic Panel; Future    Mixed hyperlipidemia  -     Lipoprotein NMR  -     rosuvastatin (CRESTOR) 20 MG tablet; Take 1 tablet by mouth Every Night.  -     fenofibrate (TRICOR) 145 MG tablet; Take 1 tablet by mouth Daily.  -     Comprehensive Metabolic Panel; Future    Vitamin D deficiency  -     Lipoprotein NMR  -     Comprehensive Metabolic Panel; Future  -     Vitamin D 25 Hydroxy; Future    Chronic kidney disease, stage III (moderate)  -     Lipoprotein NMR  -     Comprehensive Metabolic Panel; Future    Other orders  -     Insulin Degludec 200 UNIT/ML solution pen-injector; Inject 30 Units under the skin Daily. Titrate as instructed with max of 100 units daily  -     allopurinol (ZYLOPRIM) 100 MG tablet; Take 1 tablet by mouth Daily.  -     vitamin D (ERGOCALCIFEROL) 70477 units capsule capsule; Take 1 capsule by mouth 2 (Two) Times a Week.          Plan:   In summary:  I met with the patient is metabolically stable and doing well.  Reviewed labwork prior obtained to the visit.  At this time I am going to order a detailed NMR on patient, attempt to distinguish familial hyper lipidemia.  For the treatment for praulent on Repatha.  Dictation changes today were as follows:      Start tresiba U200 - 30 units in the AM increase 2 units daily if fasting blood sugar is over 110mg/dl.      Education:  interpretation of lab results, blood sugar goals, complications of diabetes mellitus,  hypoglycemia prevention and treatment, exercise, illness management, self-monitoring of blood glucose skills, nutrition and carbohydrate counting        The total face to face time spent was  25 minutes  with additional education given: 14 minutes (greater than 50% of the total time) was spent with counseling and coordination of care on: SMBG with goals, Side effects profiles with medications, medication use and purposes, and NMR testing and inj cholesterol medication.     Return in about 3 months (around 8/10/2018), or if symptoms worsen or fail to improve, for Recheck. 3 months with Analia-2 weeks prior for labs 6 months with Dr. Chahal-2 weeks prior for labs        Dragon transcription disclaimer     Much of this encounter note is an electronic transcription/translation of spoken language to printed text. The electronic translation of spoken language may permit erroneous, or at times, nonsensical words or phrases to be inadvertently transcribed. Although I have reviewed the note for such errors, some may still exist.

## 2018-05-10 NOTE — PATIENT INSTRUCTIONS
Start tresiba U200 - 30 units in the AM increase 2 units daily if fasting blood sugar is over 110mg/dl.

## 2018-05-11 ENCOUNTER — PRIOR AUTHORIZATION (OUTPATIENT)
Dept: ENDOCRINOLOGY | Age: 63
End: 2018-05-11

## 2018-05-11 NOTE — TELEPHONE ENCOUNTER
PT'S PA FOR VASCEPA WAS SUBMITTED TO [facility] ON 5/11/18 AND WAS APPROVED. PHARMACY WAS NOTIFIED.

## 2018-05-14 LAB
CHOLEST SERPL-MCNC: 158 MG/DL (ref 100–199)
HDL SERPL-SCNC: 28.4 UMOL/L
HDLC SERPL-MCNC: 26 MG/DL
INTERPRETATION: NORMAL
INTERPRETATION: NORMAL
LDL SERPL QN: ABNORMAL NM
LDL SERPL-SCNC: <300 NMOL/L
LDL SMALL SERPL-SCNC: <90 NMOL/L
LDLC SERPL CALC-MCNC: ABNORMAL MG/DL (ref 0–99)
LP-IR SCORE SERPL: 62
Lab: NORMAL
TRIGL SERPL-MCNC: 804 MG/DL (ref 0–149)

## 2018-05-15 DIAGNOSIS — I10 BENIGN ESSENTIAL HYPERTENSION: ICD-10-CM

## 2018-05-16 RX ORDER — AMLODIPINE BESYLATE 5 MG/1
5 TABLET ORAL
Qty: 60 TABLET | Refills: 11 | Status: SHIPPED | OUTPATIENT
Start: 2018-05-16 | End: 2019-05-04 | Stop reason: SDUPTHER

## 2018-05-18 DIAGNOSIS — E78.5 HYPERLIPIDEMIA ASSOCIATED WITH TYPE 2 DIABETES MELLITUS (HCC): Primary | ICD-10-CM

## 2018-05-18 DIAGNOSIS — E11.69 HYPERLIPIDEMIA ASSOCIATED WITH TYPE 2 DIABETES MELLITUS (HCC): Primary | ICD-10-CM

## 2018-05-18 DIAGNOSIS — E78.2 MIXED HYPERLIPIDEMIA: ICD-10-CM

## 2018-05-18 DIAGNOSIS — E78.1 FAMILIAL HYPERTRIGLYCERIDEMIA: ICD-10-CM

## 2018-05-24 ENCOUNTER — OFFICE VISIT (OUTPATIENT)
Dept: CARDIOLOGY | Facility: CLINIC | Age: 63
End: 2018-05-24

## 2018-05-24 VITALS
DIASTOLIC BLOOD PRESSURE: 82 MMHG | HEART RATE: 76 BPM | BODY MASS INDEX: 31.71 KG/M2 | SYSTOLIC BLOOD PRESSURE: 134 MMHG | WEIGHT: 255 LBS | HEIGHT: 75 IN

## 2018-05-24 DIAGNOSIS — I10 BENIGN ESSENTIAL HYPERTENSION: ICD-10-CM

## 2018-05-24 DIAGNOSIS — I48.92 ATRIAL FLUTTER, UNSPECIFIED TYPE (HCC): Primary | ICD-10-CM

## 2018-05-24 DIAGNOSIS — E11.9 CONTROLLED TYPE 2 DIABETES MELLITUS WITHOUT COMPLICATION, WITHOUT LONG-TERM CURRENT USE OF INSULIN (HCC): ICD-10-CM

## 2018-05-24 PROCEDURE — 99213 OFFICE O/P EST LOW 20 MIN: CPT | Performed by: INTERNAL MEDICINE

## 2018-05-24 PROCEDURE — 93000 ELECTROCARDIOGRAM COMPLETE: CPT | Performed by: INTERNAL MEDICINE

## 2018-05-24 NOTE — PROGRESS NOTES
Subjective:     Encounter Date:05/24/2018      Patient ID: Kem Alvarado is a 62 y.o. male.    Chief Complaint: HTN, PAF    History of Present Illness    He is a clementine 62-year-old man with history of paroxysmal atrial flutter. He is on apixaban for stroke risk reduction and tolerates it quite well. An echocardiogram showed normal LV function.    He is being treated for diabetes through an endocrinologist. He says that he has to make a few changes to get his sugar under better control.    He continues to work full time and is interviewing for a new job. He exercises on a regular basis. He has no complaints.        Review of Systems   All other systems reviewed and are negative.        ECG 12 Lead  Date/Time: 5/24/2018 12:50 PM  Performed by: KRISTY VELASCO  Authorized by: KRISTY VELASCO   Comparison: compared with previous ECG   Similar to previous ECG  Rhythm: sinus rhythm  BPM: 76  Clinical impression: normal ECG               Objective:     Physical Exam   Constitutional: He is oriented to person, place, and time. He appears well-developed and well-nourished.   HENT:   Head: Normocephalic and atraumatic.   Neck: Normal range of motion. Neck supple.   Cardiovascular: Normal rate, regular rhythm and normal heart sounds.    Pulmonary/Chest: Effort normal and breath sounds normal.   Abdominal: Soft. Bowel sounds are normal.   Musculoskeletal: Normal range of motion.   Neurological: He is alert and oriented to person, place, and time.   Skin: Skin is warm and dry.   Psychiatric: He has a normal mood and affect. His behavior is normal. Thought content normal.   Vitals reviewed.      Lab Review:       Assessment:          Diagnosis Plan   1. Atrial flutter, unspecified type     2. Benign essential hypertension            Plan:       It was a pleasure to see your patient in cardiac followup today.  He is doing quite well from the standpoint without any complaints.  I would recommend continuing his beta blocker and his  apixaban.  He will see me again in one year or sooner if symptoms warrant.      Atrial Fibrillation and Atrial Flutter  Assessment  • The patient has paroxysmal atrial flutter  • This is non-valvular in etiology  • The patient's CHADS2-VASc score is 3  • A [id number] score of 2 or more is considered a high risk for a thromboembolic event  • Apixaban prescribed    Plan  • Attempt to maintain sinus rhythm  • Continue apixaban for antithrombotic therapy, bleeding issues discussed  • Continue beta blocker for rhythm control  • Continue beta blocker for rate control

## 2018-05-25 RX ORDER — METOPROLOL TARTRATE 50 MG/1
50 TABLET, FILM COATED ORAL
Qty: 60 TABLET | Refills: 5 | Status: SHIPPED | OUTPATIENT
Start: 2018-05-25 | End: 2018-11-17 | Stop reason: SDUPTHER

## 2018-06-08 DIAGNOSIS — E29.1 MALE HYPOGONADISM: ICD-10-CM

## 2018-06-15 RX ORDER — TESTOSTERONE 16.2 MG/G
GEL TRANSDERMAL
Qty: 150 G | Refills: 5 | OUTPATIENT
Start: 2018-06-15

## 2018-07-12 DIAGNOSIS — E11.9 CONTROLLED TYPE 2 DIABETES MELLITUS WITHOUT COMPLICATION, WITHOUT LONG-TERM CURRENT USE OF INSULIN (HCC): ICD-10-CM

## 2018-07-13 RX ORDER — LIRAGLUTIDE 6 MG/ML
INJECTION SUBCUTANEOUS
Qty: 9 ML | Refills: 0 | Status: SHIPPED | OUTPATIENT
Start: 2018-07-13 | End: 2018-07-24

## 2018-07-14 DIAGNOSIS — E29.1 MALE HYPOGONADISM: ICD-10-CM

## 2018-07-17 DIAGNOSIS — E11.9 CONTROLLED TYPE 2 DIABETES MELLITUS WITHOUT COMPLICATION, WITHOUT LONG-TERM CURRENT USE OF INSULIN (HCC): ICD-10-CM

## 2018-07-17 RX ORDER — LIRAGLUTIDE 6 MG/ML
1.8 INJECTION SUBCUTANEOUS DAILY
Qty: 9 ML | Refills: 5 | Status: SHIPPED | OUTPATIENT
Start: 2018-07-17 | End: 2018-08-22 | Stop reason: SDUPTHER

## 2018-07-24 ENCOUNTER — TELEPHONE (OUTPATIENT)
Dept: ENDOCRINOLOGY | Age: 63
End: 2018-07-24

## 2018-07-24 DIAGNOSIS — IMO0002 UNCONTROLLED TYPE 2 DIABETES MELLITUS WITH COMPLICATION, WITHOUT LONG-TERM CURRENT USE OF INSULIN: Primary | ICD-10-CM

## 2018-07-24 NOTE — TELEPHONE ENCOUNTER
----- Message from Katie Graves MA sent at 7/24/2018 10:38 AM EDT -----      ----- Message -----  From: JIM Foss  Sent: 7/24/2018  10:29 AM  To: Katie Graves MA    Changed to toujeo  ----- Message -----  From: Katie Graves MA  Sent: 7/24/2018   7:37 AM  To: JIM Foss        ----- Message -----  From: Vale Vásquez MA  Sent: 7/24/2018   7:23 AM  To: Katie Graves MA    Pt's insurance will not pay for tresiba without a trial of lantus, levemir or toujeo first. Will you ask alvarez if she would like for me to appeal or is she wanting to change medication. Please advise.

## 2018-07-25 RX ORDER — TESTOSTERONE 16.2 MG/G
GEL TRANSDERMAL
Qty: 150 G | Refills: 5 | OUTPATIENT
Start: 2018-07-25

## 2018-08-11 ENCOUNTER — RESULTS ENCOUNTER (OUTPATIENT)
Dept: ENDOCRINOLOGY | Age: 63
End: 2018-08-11

## 2018-08-11 DIAGNOSIS — E78.1 FAMILIAL HYPERTRIGLYCERIDEMIA: ICD-10-CM

## 2018-08-11 DIAGNOSIS — E78.5 HYPERLIPIDEMIA ASSOCIATED WITH TYPE 2 DIABETES MELLITUS (HCC): ICD-10-CM

## 2018-08-11 DIAGNOSIS — E78.2 MIXED HYPERLIPIDEMIA: ICD-10-CM

## 2018-08-11 DIAGNOSIS — E29.1 MALE HYPOGONADISM: ICD-10-CM

## 2018-08-11 DIAGNOSIS — E55.9 VITAMIN D DEFICIENCY: ICD-10-CM

## 2018-08-11 DIAGNOSIS — E11.69 HYPERLIPIDEMIA ASSOCIATED WITH TYPE 2 DIABETES MELLITUS (HCC): ICD-10-CM

## 2018-08-11 DIAGNOSIS — E03.9 ACQUIRED HYPOTHYROIDISM: ICD-10-CM

## 2018-08-11 DIAGNOSIS — E11.9 CONTROLLED TYPE 2 DIABETES MELLITUS WITHOUT COMPLICATION, WITHOUT LONG-TERM CURRENT USE OF INSULIN (HCC): ICD-10-CM

## 2018-08-11 DIAGNOSIS — N18.30 CHRONIC KIDNEY DISEASE, STAGE III (MODERATE) (HCC): ICD-10-CM

## 2018-08-11 DIAGNOSIS — I10 BENIGN ESSENTIAL HYPERTENSION: ICD-10-CM

## 2018-08-15 LAB
25(OH)D3+25(OH)D2 SERPL-MCNC: 45 NG/ML (ref 30–100)
ALBUMIN SERPL-MCNC: 4.6 G/DL (ref 3.5–5.2)
ALBUMIN/GLOB SERPL: 1.8 G/DL
ALP SERPL-CCNC: 50 U/L (ref 39–117)
ALT SERPL-CCNC: 33 U/L (ref 1–41)
AST SERPL-CCNC: 44 U/L (ref 1–40)
BASOPHILS # BLD AUTO: 0.04 10*3/MM3 (ref 0–0.2)
BASOPHILS NFR BLD AUTO: 0.7 % (ref 0–1.5)
BILIRUB SERPL-MCNC: 0.3 MG/DL (ref 0.1–1.2)
BUN SERPL-MCNC: 27 MG/DL (ref 8–23)
BUN/CREAT SERPL: 16 (ref 7–25)
C PEPTIDE SERPL-MCNC: 6.3 NG/ML (ref 1.1–4.4)
CALCIUM SERPL-MCNC: 9.7 MG/DL (ref 8.6–10.5)
CHLORIDE SERPL-SCNC: 102 MMOL/L (ref 98–107)
CHOLEST SERPL-MCNC: 117 MG/DL (ref 0–200)
CO2 SERPL-SCNC: 21.4 MMOL/L (ref 22–29)
CREAT SERPL-MCNC: 1.69 MG/DL (ref 0.76–1.27)
EOSINOPHIL # BLD AUTO: 0.22 10*3/MM3 (ref 0–0.7)
EOSINOPHIL NFR BLD AUTO: 4.1 % (ref 0.3–6.2)
ERYTHROCYTE [DISTWIDTH] IN BLOOD BY AUTOMATED COUNT: 13.9 % (ref 11.5–14.5)
GLOBULIN SER CALC-MCNC: 2.6 GM/DL
GLUCOSE SERPL-MCNC: 161 MG/DL (ref 65–99)
HBA1C MFR BLD: 7.97 % (ref 4.8–5.6)
HCT VFR BLD AUTO: 44.1 % (ref 40.4–52.2)
HDLC SERPL-MCNC: 35 MG/DL (ref 40–60)
HGB BLD-MCNC: 14.3 G/DL (ref 13.7–17.6)
IMM GRANULOCYTES # BLD: 0 10*3/MM3 (ref 0–0.03)
IMM GRANULOCYTES NFR BLD: 0 % (ref 0–0.5)
INSULIN SERPL-ACNC: 17.6 UIU/ML (ref 2.6–24.9)
INTERPRETATION: NORMAL
INTERPRETATION: NORMAL
LDLC SERPL CALC-MCNC: 19 MG/DL (ref 0–100)
LYMPHOCYTES # BLD AUTO: 1.29 10*3/MM3 (ref 0.9–4.8)
LYMPHOCYTES NFR BLD AUTO: 23.8 % (ref 19.6–45.3)
Lab: NORMAL
Lab: NORMAL
MCH RBC QN AUTO: 28 PG (ref 27–32.7)
MCHC RBC AUTO-ENTMCNC: 32.4 G/DL (ref 32.6–36.4)
MCV RBC AUTO: 86.5 FL (ref 79.8–96.2)
MONOCYTES # BLD AUTO: 0.43 10*3/MM3 (ref 0.2–1.2)
MONOCYTES NFR BLD AUTO: 7.9 % (ref 5–12)
NEUTROPHILS # BLD AUTO: 3.44 10*3/MM3 (ref 1.9–8.1)
NEUTROPHILS NFR BLD AUTO: 63.5 % (ref 42.7–76)
PLATELET # BLD AUTO: 186 10*3/MM3 (ref 140–500)
POTASSIUM SERPL-SCNC: 5.2 MMOL/L (ref 3.5–5.2)
PROT SERPL-MCNC: 7.2 G/DL (ref 6–8.5)
RBC # BLD AUTO: 5.1 10*6/MM3 (ref 4.6–6)
SODIUM SERPL-SCNC: 140 MMOL/L (ref 136–145)
T3FREE SERPL-MCNC: 2.8 PG/ML (ref 2–4.4)
T4 FREE SERPL-MCNC: 1.07 NG/DL (ref 0.93–1.7)
TESTOST FREE SERPL-MCNC: 8.8 PG/ML (ref 6.6–18.1)
TESTOST SERPL-MCNC: 403 NG/DL (ref 264–916)
THYROGLOB AB SERPL-ACNC: <1 IU/ML (ref 0–0.9)
THYROPEROXIDASE AB SERPL-ACNC: 10 IU/ML (ref 0–34)
TRIGL SERPL-MCNC: 315 MG/DL (ref 0–150)
TSH SERPL DL<=0.005 MIU/L-ACNC: 2.26 MIU/ML (ref 0.27–4.2)
URATE SERPL-MCNC: 5.5 MG/DL (ref 3.4–7)
VLDLC SERPL CALC-MCNC: 63 MG/DL (ref 5–40)
WBC # BLD AUTO: 5.42 10*3/MM3 (ref 4.5–10.7)

## 2018-08-16 ENCOUNTER — TELEPHONE (OUTPATIENT)
Dept: ENDOCRINOLOGY | Age: 63
End: 2018-08-16

## 2018-08-16 NOTE — TELEPHONE ENCOUNTER
PA WAS SUBMITTED ON 8/16/18 AND WAS APPROVED ON 8/17/18    ----- Message from Analia Beltran sent at 8/14/2018  2:51 PM EDT -----  Contact: [facility]   PIROR AUTHORIZATION REQUEST   MEDICATION :   ANDROGEL PUMP 20.25 MG/ACT (1.62%) gel  AND TRESBIA    MESSAGE: [facility] HAS CALLED IN REGARDS  TO GETTING AN UPDATE ON TWO PRIOR AUTHORIZATION.  THEY HAVE NOT HEARD FROM OUR OFFICE SINCE 07/18/2018. THEY ARE REQUESTING A CALL BACK   CALL BACK NUMBER ;  [phone]

## 2018-08-22 ENCOUNTER — OFFICE VISIT (OUTPATIENT)
Dept: ENDOCRINOLOGY | Age: 63
End: 2018-08-22

## 2018-08-22 VITALS
DIASTOLIC BLOOD PRESSURE: 86 MMHG | HEIGHT: 75 IN | SYSTOLIC BLOOD PRESSURE: 148 MMHG | WEIGHT: 262 LBS | BODY MASS INDEX: 32.58 KG/M2

## 2018-08-22 DIAGNOSIS — E78.1 FAMILIAL HYPERTRIGLYCERIDEMIA: ICD-10-CM

## 2018-08-22 DIAGNOSIS — E29.1 MALE HYPOGONADISM: ICD-10-CM

## 2018-08-22 DIAGNOSIS — E11.9 CONTROLLED TYPE 2 DIABETES MELLITUS WITHOUT COMPLICATION, WITHOUT LONG-TERM CURRENT USE OF INSULIN (HCC): Primary | ICD-10-CM

## 2018-08-22 DIAGNOSIS — E03.9 ACQUIRED HYPOTHYROIDISM: ICD-10-CM

## 2018-08-22 DIAGNOSIS — E55.9 VITAMIN D DEFICIENCY: ICD-10-CM

## 2018-08-22 DIAGNOSIS — I10 BENIGN ESSENTIAL HYPERTENSION: ICD-10-CM

## 2018-08-22 DIAGNOSIS — IMO0002 UNCONTROLLED TYPE 2 DIABETES MELLITUS WITH COMPLICATION, WITHOUT LONG-TERM CURRENT USE OF INSULIN: ICD-10-CM

## 2018-08-22 DIAGNOSIS — N18.30 CHRONIC KIDNEY DISEASE, STAGE III (MODERATE) (HCC): ICD-10-CM

## 2018-08-22 DIAGNOSIS — E79.0 HYPERURICEMIA: ICD-10-CM

## 2018-08-22 DIAGNOSIS — E78.2 MIXED HYPERLIPIDEMIA: ICD-10-CM

## 2018-08-22 PROCEDURE — 99214 OFFICE O/P EST MOD 30 MIN: CPT | Performed by: NURSE PRACTITIONER

## 2018-08-22 RX ORDER — LIRAGLUTIDE 6 MG/ML
1.8 INJECTION SUBCUTANEOUS DAILY
Qty: 9 ML | Refills: 5 | Status: SHIPPED | OUTPATIENT
Start: 2018-08-22 | End: 2019-03-12

## 2018-08-22 RX ORDER — ROSUVASTATIN CALCIUM 20 MG/1
20 TABLET, COATED ORAL NIGHTLY
Qty: 30 TABLET | Refills: 5 | Status: SHIPPED | OUTPATIENT
Start: 2018-08-22 | End: 2019-03-12

## 2018-08-22 RX ORDER — ALLOPURINOL 100 MG/1
100 TABLET ORAL DAILY
Qty: 30 TABLET | Refills: 5 | Status: SHIPPED | OUTPATIENT
Start: 2018-08-22 | End: 2019-03-12 | Stop reason: SDUPTHER

## 2018-08-22 RX ORDER — LEVOTHYROXINE SODIUM 0.07 MG/1
75 TABLET ORAL DAILY
Qty: 30 TABLET | Refills: 5 | Status: SHIPPED | OUTPATIENT
Start: 2018-08-22 | End: 2019-01-28 | Stop reason: SDUPTHER

## 2018-08-22 RX ORDER — ERGOCALCIFEROL 1.25 MG/1
50000 CAPSULE ORAL 2 TIMES WEEKLY
Qty: 36 CAPSULE | Refills: 3 | Status: SHIPPED | OUTPATIENT
Start: 2018-08-23 | End: 2019-03-12 | Stop reason: SDUPTHER

## 2018-08-22 RX ORDER — ICOSAPENT ETHYL 1000 MG/1
2 CAPSULE ORAL 2 TIMES DAILY WITH MEALS
Qty: 120 CAPSULE | Refills: 3 | Status: SHIPPED | OUTPATIENT
Start: 2018-08-22 | End: 2019-03-12 | Stop reason: SDUPTHER

## 2018-08-22 RX ORDER — FENOFIBRATE 145 MG/1
145 TABLET, COATED ORAL DAILY
Qty: 30 TABLET | Refills: 5 | Status: SHIPPED | OUTPATIENT
Start: 2018-08-22 | End: 2018-10-15 | Stop reason: SDUPTHER

## 2018-08-22 RX ORDER — NATEGLINIDE 120 MG/1
120 TABLET ORAL
Qty: 90 TABLET | Refills: 5 | Status: SHIPPED | OUTPATIENT
Start: 2018-08-22 | End: 2019-03-12 | Stop reason: SDUPTHER

## 2018-08-22 NOTE — PROGRESS NOTES
Kem Alvarado  presents to the office  for the follow-up appointment for Type 2 diabetes mellitus.     The diabete's condition location is throughout the system with the  clinical course has fluctuated, the severity is Mild, and the modifying/allievating factors are insulin.  Medications and  Dosages were  reviewed with Kem Alvarado and suggested that compliance most of the time.    The patient reports associated symptoms of hyperglycemia have been headache and associated symptoms of hypoglycemia have been none, with their  hypoglycemia threshold for symptoms is n/a mg/dl .     The patient is currently on insulin.    Compliance with blood glucose monitoring: good.     Meal panning: The patient is using avoidance of concentrated sweets.    The patient is currently taking home blood tests - Blood glucose testin-3 times daily, that are:  fasting- 1st thing in morning before eating or drinking  before each meal and 1 or 2 hours after meal    Last instructions given were:  Start tresiba U200 - 30 units in the AM increase 2 units daily if fasting blood sugar is over 110mg/dl.    Home blood glucose testing daily: 2-3  glucometer upload- 2018 through 2018    Last reported blood glucose readings are:  Home blood glucose testing daily: 0-1  FB to 180 mg/dl    Patterns reported per patient are that he recently got a new job with new insurance and his insulin hasn't been approved so he has not been using insulin.          The following portions of the patient's history were reviewed and updated as appropriate: current medications, past family history, past medical history, past social history, past surgical history and problem list.      Current Outpatient Prescriptions:   •  allopurinol (ZYLOPRIM) 100 MG tablet, Take 1 tablet by mouth Daily., Disp: 30 tablet, Rfl: 5  •  amLODIPine (NORVASC) 5 MG tablet, TAKE 1 TABLET BY MOUTH 2 (TWO) TIMES A DAY., Disp: 60 tablet, Rfl: 11  •  ELIQUIS 5 MG tablet  tablet, TAKE ONE TABLET BY MOUTH TWO TIMES A DAY., Disp: 60 tablet, Rfl: 11  •  fenofibrate (TRICOR) 145 MG tablet, Take 1 tablet by mouth Daily., Disp: 30 tablet, Rfl: 5  •  glucose blood (ACCU-CHEK MARIA L) test strip, Check blood sugar 4-5 times daily, Disp: 200 each, Rfl: 5  •  Insulin Pen Needle (PEN NEEDLES) 31G X 6 MM misc, 5-6 injections daily, Disp: 200 each, Rfl: 3  •  levothyroxine (SYNTHROID, LEVOTHROID) 75 MCG tablet, Take 1 tablet by mouth Daily., Disp: 30 tablet, Rfl: 5  •  metoprolol tartrate (LOPRESSOR) 50 MG tablet, Take 1 tablet by mouth 2 (Two) Times a Day., Disp: 60 tablet, Rfl: 5  •  metoprolol tartrate (LOPRESSOR) 50 MG tablet, TAKE 1 TABLET BY MOUTH 2 (TWO) TIMES A DAY., Disp: 60 tablet, Rfl: 5  •  nateglinide (STARLIX) 120 MG tablet, Take 1 tablet by mouth 3 (Three) Times a Day Before Meals., Disp: 90 tablet, Rfl: 5  •  rosuvastatin (CRESTOR) 20 MG tablet, Take 1 tablet by mouth Every Night., Disp: 30 tablet, Rfl: 5  •  VASCEPA 1 g capsule capsule, Take 2 g by mouth 2 (Two) Times a Day With Meals., Disp: 120 capsule, Rfl: 3  •  VICTOZA 18 MG/3ML solution pen-injector injection, Inject 1.8 mg under the skin Daily., Disp: 9 mL, Rfl: 5  •  vitamin D (ERGOCALCIFEROL) 60509 units capsule capsule, Take 1 capsule by mouth 2 (Two) Times a Week., Disp: 36 capsule, Rfl: 3  •  Alirocumab (PRALUENT) 150 MG/ML solution prefilled syringe, Inject 150 mg under the skin Every 14 (Fourteen) Days., Disp: 2 syringe, Rfl: 6  •  ANDROGEL PUMP 20.25 MG/ACT (1.62%) gel, Place 2 pumps on each shoulder daily, Disp: 150 g, Rfl: 5  •  Insulin Glargine 300 UNIT/ML solution pen-injector, Inject 30 Units under the skin into the appropriate area as directed Daily. Increase as instructed with a max of 100 units daily, Disp: 9 pen, Rfl: 4    Patient Active Problem List    Diagnosis   • Vitamin D deficiency [E55.9]   • Male hypogonadism [E29.1]   • Low testosterone [E34.9]   • Uncontrolled type 2 diabetes mellitus with  "complication, without long-term current use of insulin (CMS/HCC) [E11.8, E11.65]   • Familial hypertriglyceridemia [E78.1]   • Mixed hyperlipidemia [E78.2]   • Hyperlipidemia associated with type 2 diabetes mellitus (CMS/HCC) [E11.69, E78.5]   • Atrial flutter (CMS/HCC) [I48.92]     Overview Note:     Description: Acute episode 9/15. Follows with Dr Fair     • Benign essential hypertension [I10]     Overview Note:     Description: Father  at 52 years of age with MI. Follows with Dr Fair.     • Chronic kidney disease, stage III (moderate) [N18.3]   • Controlled type 2 diabetes mellitus without complication (CMS/HCC) [E11.9]     Overview Note:     Description: Onset after spell of pancreatitis in . Follows with Dr Curran. 3/15 Transfer diabetic care to this office.Follows annually with [facility] for eye exams.     • Hypothyroidism [E03.9]       Review of Systems   A comprehensive review of the 14 systems was negative except of listed below:  Endocrine: hyperglycemia     Objective:     Wt Readings from Last 3 Encounters:   18 119 kg (262 lb)   18 116 kg (255 lb)   05/10/18 117 kg (258 lb)     Temp Readings from Last 3 Encounters:   17 97.5 °F (36.4 °C) (Oral)   10/03/16 97.8 °F (36.6 °C)   16 97.8 °F (36.6 °C) (Oral)     BP Readings from Last 3 Encounters:   18 148/86   18 134/82   05/10/18 140/82     Pulse Readings from Last 3 Encounters:   18 76   17 76   17 83        /86   Ht 190.5 cm (75\")   Wt 119 kg (262 lb)   BMI 32.75 kg/m²     General appearance:  alert, cooperative, delirious, fatigued, nourished\" \"appears stated age and cooperative\" \"NAD   Neck: no carotid bruit, supple, symmetrical, trachea midline and thyroid not enlarged, symmetric, no tenderness/mass/nodules   Thyroid:  no palpable nodule, no enlargement, no tenderness   Lung:  \"clear to auscultation bilaterally\" \"no abnormal breath sounds  \" effort was normal, no labored breath, no use of " "accessory muscles.   Heart: regular rate and rhythm, S1, S2 normal, no murmur, click, rub or gallop      Abdomen:  normal bowel sounds- 4 quads, soft non-tender, contour - rounded and contour - obese      Extremities: extremities normal, atraumatic, no cyanosis or edema extremities normal, atraumatic, no cyanosis or edema\" WNL - gait and station, strength and stability\"       Skin:   Pulses:  warm and dry, no hyperpigmentation, normal skin coloring, or suspicious lesions   2+ and symmetric   Neuro: Alert and oriented x3. Gait normal. Reflexes and motor strength normal and symmetric. Cranial nerves 2-12 and sensation grossly intact.      Psych: behavior - normal, judgement - normal, mood - normal, affect - normal                 Lab Review  Results for orders placed or performed in visit on 08/11/18   C-Peptide   Result Value Ref Range    C-Peptide 6.3 (H) 1.1 - 4.4 ng/mL   Comprehensive Metabolic Panel   Result Value Ref Range    Glucose 161 (H) 65 - 99 mg/dL    BUN 27 (H) 8 - 23 mg/dL    Creatinine 1.69 (H) 0.76 - 1.27 mg/dL    eGFR Non African Am 41 (L) >60 mL/min/1.73    eGFR African Am 50 (L) >60 mL/min/1.73    BUN/Creatinine Ratio 16.0 7.0 - 25.0    Sodium 140 136 - 145 mmol/L    Potassium 5.2 3.5 - 5.2 mmol/L    Chloride 102 98 - 107 mmol/L    Total CO2 21.4 (L) 22.0 - 29.0 mmol/L    Calcium 9.7 8.6 - 10.5 mg/dL    Total Protein 7.2 6.0 - 8.5 g/dL    Albumin 4.60 3.50 - 5.20 g/dL    Globulin 2.6 gm/dL    A/G Ratio 1.8 g/dL    Total Bilirubin 0.3 0.1 - 1.2 mg/dL    Alkaline Phosphatase 50 39 - 117 U/L    AST (SGOT) 44 (H) 1 - 40 U/L    ALT (SGPT) 33 1 - 41 U/L   Hemoglobin A1c   Result Value Ref Range    Hemoglobin A1C 7.97 (H) 4.80 - 5.60 %   Insulin, Total   Result Value Ref Range    Insulin 17.6 2.6 - 24.9 uIU/mL   Lipid Panel   Result Value Ref Range    Total Cholesterol 117 0 - 200 mg/dL    Triglycerides 315 (H) 0 - 150 mg/dL    HDL Cholesterol 35 (L) 40 - 60 mg/dL    VLDL Cholesterol 63 (H) 5 - 40 mg/dL "    LDL Cholesterol  19 0 - 100 mg/dL   Uric Acid   Result Value Ref Range    Uric Acid 5.5 3.4 - 7.0 mg/dL   Vitamin D 25 Hydroxy   Result Value Ref Range    25 Hydroxy, Vitamin D 45.0 30.0 - 100.0 ng/ml   TSH   Result Value Ref Range    TSH 2.260 0.270 - 4.200 mIU/mL   Thyroid Antibodies   Result Value Ref Range    Thyroid Peroxidase Antibody 10 0 - 34 IU/mL    Thyroglobulin Ab <1.0 0.0 - 0.9 IU/mL   T4, Free   Result Value Ref Range    Free T4 1.07 0.93 - 1.70 ng/dL   T3, Free   Result Value Ref Range    T3, Free 2.8 2.0 - 4.4 pg/mL   Testosterone, Free, Total   Result Value Ref Range    Testosterone, Total 403 264 - 916 ng/dL    Testosterone, Free 8.8 6.6 - 18.1 pg/mL   [facility] CKD Program   Result Value Ref Range    Interpretation Note    Cardiovascular Risk Assessment   Result Value Ref Range    Interpretation Note     PDF Image Not applicable    Diabetes Patient Education   Result Value Ref Range    PDF Image Not applicable    CBC & Differential   Result Value Ref Range    WBC 5.42 4.50 - 10.70 10*3/mm3    RBC 5.10 4.60 - 6.00 10*6/mm3    Hemoglobin 14.3 13.7 - 17.6 g/dL    Hematocrit 44.1 40.4 - 52.2 %    MCV 86.5 79.8 - 96.2 fL    MCH 28.0 27.0 - 32.7 pg    MCHC 32.4 (L) 32.6 - 36.4 g/dL    RDW 13.9 11.5 - 14.5 %    Platelets 186 140 - 500 10*3/mm3    Neutrophil Rel % 63.5 42.7 - 76.0 %    Lymphocyte Rel % 23.8 19.6 - 45.3 %    Monocyte Rel % 7.9 5.0 - 12.0 %    Eosinophil Rel % 4.1 0.3 - 6.2 %    Basophil Rel % 0.7 0.0 - 1.5 %    Neutrophils Absolute 3.44 1.90 - 8.10 10*3/mm3    Lymphocytes Absolute 1.29 0.90 - 4.80 10*3/mm3    Monocytes Absolute 0.43 0.20 - 1.20 10*3/mm3    Eosinophils Absolute 0.22 0.00 - 0.70 10*3/mm3    Basophils Absolute 0.04 0.00 - 0.20 10*3/mm3    Immature Granulocyte Rel % 0.0 0.0 - 0.5 %    Immature Grans Absolute 0.00 0.00 - 0.03 10*3/mm3           Assessment:   Kem was seen today for follow-up.    Diagnoses and all orders for this visit:    Controlled type 2 diabetes  mellitus without complication, without long-term current use of insulin (CMS/[facility])    Acquired hypothyroidism    Male hypogonadism    Benign essential hypertension    Vitamin D deficiency    Chronic kidney disease, stage III (moderate)          Plan:   In summary: Patient reports only taking the victozia and starlix at this time due to insurance change. Toujeo changed from tresiba in July - not notified.  Lab work prior obtained to the visit.  [place] are given for follow-up with labs 2 weeks prior. Aat this time the following medication changes will be completed:  Start toujeo - 20 units in the AM increase 2 units daily if fasting blood sugar is over   110mg/dl.    Continue the victoza and starlix    Continue /re-start the vascepa 1g - take 2 twice daily    Re-start the tricor 145mg/ daily in the evening.    Hold the androgel and the praluent at this time.     home blood tests -  Blood glucose testin-3 times daily, that are:  fasting- 1st thing in morning before eating or drinking  before each meal and 1 or 2 hours after meal  bedtime  anytime you feel symptoms of hyperglycemia or hypoglycemia (high or low blood sugars)    Education:  interpretation of lab results, blood sugar goals, complications of diabetes mellitus, hypoglycemia prevention and treatment, exercise, illness management, self-monitoring of blood glucose skills, nutrition, carbohydrate counting, site rotation, use of insulin pen and insulin adjustments        The total face to face time spent was  25 minutes  with additional education given: 14 minutes (greater than 50% of the total time) was spent with counseling and coordination of care on: SMBG with goals, Side effects profiles with medications, medication use and purposes, .    Return in about 3 months (around 2018), or if symptoms worsen or fail to improve, for Recheck. 3 months with Anal[facility] - 2 weeks prior for labs, 6 months with Dr. Chahal 2 weeks prior for labs        Dragon transcription  disclaimer     Much of this encounter note is an electronic transcription/translation of spoken language to printed text. The electronic translation of spoken language may permit erroneous, or at times, nonsensical words or phrases to be inadvertently transcribed. Although I have reviewed the note for such errors, some may still exist.  Answers for HPI/ROS submitted by the patient on 8/21/2018   Diabetes problem  Diabetes type: type 2  MedicAlert ID: No  Disease duration: 12 years  blurred vision: No  chest pain: No  fatigue: Yes  foot paresthesias: No  foot ulcerations: No  polydipsia: No  polyphagia: No  polyuria: No  visual change: No  weakness: No  weight loss: No  Symptom course: stable  confusion: No  dizziness: No  headaches: No  hunger: No  mood changes: Yes  nervous/anxious: Yes  pallor: No  seizures: No  sleepiness: No  speech difficulty: No  sweats: No  tremors: No  blackouts: No  hospitalization: No  nocturnal hypoglycemia: No  required assistance: No  required glucagon: No  CVA: No  heart disease: No  impotence: Yes  nephropathy: Yes  peripheral neuropathy: Yes  PVD: No  retinopathy: No  CAD risks: dyslipidemia, family history, hypertension, obesity, stress  Current treatments: diet, insulin injections, oral agent (dual therapy)  Treatment compliance: most of the time  Dose schedule: pre-breakfast  Given by: patient  Injection sites: abdominal wall  Home blood tests: 1-2 x per day  Monitoring compliance: fair  Blood glucose trend: fluctuating minimally  breakfast time: after 10 am  breakfast glucose level: 130-140  lunch time: after 3 pm  lunch glucose level: 110-130  dinner time: 5-6 pm  dinner glucose level:   High score: 140-180  Overall: 130-140  Weight trend: stable  Current diet: generally healthy  Meal planning: ADA exchanges, avoidance of concentrated sweets  Exercise: three times a week  Dietitian visit: Yes  Eye exam current: No  Sees podiatrist: No

## 2018-08-22 NOTE — PATIENT INSTRUCTIONS
Start toujeo - 20 units in the AM increase 2 units daily if fasting blood sugar is over   110mg/dl.    Continue the victoza and starlix    Continue /re-start the vascepa 1g - take 2 twice daily    Re-start the tricor 145mg/ daily in the evening.    Hold the androgel and the praluent at this time.     home blood tests -  Blood glucose testin-3 times daily, that are:  fasting- 1st thing in morning before eating or drinking  before each meal and 1 or 2 hours after meal  bedtime  anytime you feel symptoms of hyperglycemia or hypoglycemia (high or low blood sugars)

## 2018-09-04 RX ORDER — CEPHALEXIN 500 MG/1
500 CAPSULE ORAL 3 TIMES DAILY
Qty: 21 CAPSULE | Refills: 0 | Status: SHIPPED | OUTPATIENT
Start: 2018-09-04 | End: 2018-09-11

## 2018-09-05 ENCOUNTER — TELEPHONE (OUTPATIENT)
Dept: CARDIOLOGY | Facility: CLINIC | Age: 63
End: 2018-09-05

## 2018-09-05 NOTE — TELEPHONE ENCOUNTER
----- Message from Aleks Meadows MD sent at 9/4/2018  4:11 PM EDT -----  Pt has an infected cyst on his back I need to remove in 10-12 days after he finishes antibiotics.  He is on Eliquis.  Can I have him stop that for office procedure?  And for how long?  Thanks.

## 2018-09-06 RX ORDER — APIXABAN 5 MG/1
TABLET, FILM COATED ORAL
Qty: 180 TABLET | Refills: 3 | Status: SHIPPED | OUTPATIENT
Start: 2018-09-06 | End: 2019-10-08 | Stop reason: SDUPTHER

## 2018-09-11 ENCOUNTER — DOCUMENTATION (OUTPATIENT)
Dept: CARDIOLOGY | Facility: CLINIC | Age: 63
End: 2018-09-11

## 2018-10-14 DIAGNOSIS — E78.2 MIXED HYPERLIPIDEMIA: ICD-10-CM

## 2018-10-15 RX ORDER — FENOFIBRATE 145 MG/1
145 TABLET, COATED ORAL DAILY
Qty: 30 TABLET | Refills: 5 | Status: SHIPPED | OUTPATIENT
Start: 2018-10-15 | End: 2019-03-12 | Stop reason: SDUPTHER

## 2018-11-17 DIAGNOSIS — E78.2 MIXED HYPERLIPIDEMIA: ICD-10-CM

## 2018-11-17 DIAGNOSIS — I10 BENIGN ESSENTIAL HYPERTENSION: ICD-10-CM

## 2018-11-17 DIAGNOSIS — E11.9 CONTROLLED TYPE 2 DIABETES MELLITUS WITHOUT COMPLICATION, WITHOUT LONG-TERM CURRENT USE OF INSULIN (HCC): ICD-10-CM

## 2018-11-19 RX ORDER — METOPROLOL TARTRATE 50 MG/1
TABLET, FILM COATED ORAL
Qty: 60 TABLET | Refills: 5 | Status: SHIPPED | OUTPATIENT
Start: 2018-11-19 | End: 2019-03-12

## 2018-11-19 RX ORDER — ROSUVASTATIN CALCIUM 20 MG/1
20 TABLET, COATED ORAL NIGHTLY
Qty: 30 TABLET | Refills: 5 | Status: SHIPPED | OUTPATIENT
Start: 2018-11-19 | End: 2019-03-12

## 2019-01-10 RX ORDER — TESTOSTERONE 16.2 MG/G
GEL TRANSDERMAL
Qty: 150 G | Refills: 0 | Status: SHIPPED | OUTPATIENT
Start: 2019-01-10 | End: 2019-02-28 | Stop reason: SDUPTHER

## 2019-01-28 DIAGNOSIS — E03.9 ACQUIRED HYPOTHYROIDISM: ICD-10-CM

## 2019-01-28 RX ORDER — LEVOTHYROXINE SODIUM 0.07 MG/1
75 TABLET ORAL DAILY
Qty: 30 TABLET | Refills: 5 | Status: SHIPPED | OUTPATIENT
Start: 2019-01-28 | End: 2019-03-12 | Stop reason: SDUPTHER

## 2019-02-28 ENCOUNTER — LAB (OUTPATIENT)
Dept: ENDOCRINOLOGY | Age: 64
End: 2019-02-28

## 2019-02-28 DIAGNOSIS — IMO0002 UNCONTROLLED TYPE 2 DIABETES MELLITUS WITH COMPLICATION, WITHOUT LONG-TERM CURRENT USE OF INSULIN: ICD-10-CM

## 2019-02-28 DIAGNOSIS — E03.9 ACQUIRED HYPOTHYROIDISM: ICD-10-CM

## 2019-02-28 DIAGNOSIS — E11.9 CONTROLLED TYPE 2 DIABETES MELLITUS WITHOUT COMPLICATION, WITHOUT LONG-TERM CURRENT USE OF INSULIN (HCC): ICD-10-CM

## 2019-02-28 DIAGNOSIS — E29.1 MALE HYPOGONADISM: ICD-10-CM

## 2019-02-28 DIAGNOSIS — E55.9 VITAMIN D DEFICIENCY: ICD-10-CM

## 2019-02-28 DIAGNOSIS — E78.2 MIXED HYPERLIPIDEMIA: ICD-10-CM

## 2019-02-28 DIAGNOSIS — E78.2 MIXED HYPERLIPIDEMIA: Primary | ICD-10-CM

## 2019-03-01 LAB
25(OH)D3+25(OH)D2 SERPL-MCNC: 51.3 NG/ML (ref 30–100)
ALBUMIN SERPL-MCNC: 4.2 G/DL (ref 3.5–5.2)
ALBUMIN/GLOB SERPL: 1.8 G/DL
ALP SERPL-CCNC: 65 U/L (ref 39–117)
ALT SERPL-CCNC: 21 U/L (ref 1–41)
AST SERPL-CCNC: 23 U/L (ref 1–40)
BASOPHILS # BLD AUTO: 0.05 10*3/MM3 (ref 0–0.2)
BASOPHILS NFR BLD AUTO: 0.9 % (ref 0–1.5)
BILIRUB SERPL-MCNC: 0.2 MG/DL (ref 0.1–1.2)
BUN SERPL-MCNC: 24 MG/DL (ref 8–23)
BUN/CREAT SERPL: 16.1 (ref 7–25)
C PEPTIDE SERPL-MCNC: 4.9 NG/ML (ref 1.1–4.4)
CALCIUM SERPL-MCNC: 10.4 MG/DL (ref 8.6–10.5)
CHLORIDE SERPL-SCNC: 101 MMOL/L (ref 98–107)
CHOLEST SERPL-MCNC: 130 MG/DL (ref 0–200)
CO2 SERPL-SCNC: 24.8 MMOL/L (ref 22–29)
CREAT SERPL-MCNC: 1.49 MG/DL (ref 0.76–1.27)
EOSINOPHIL # BLD AUTO: 0.11 10*3/MM3 (ref 0–0.4)
EOSINOPHIL NFR BLD AUTO: 1.9 % (ref 0.3–6.2)
ERYTHROCYTE [DISTWIDTH] IN BLOOD BY AUTOMATED COUNT: 13 % (ref 12.3–15.4)
FT4I SERPL CALC-MCNC: 1.5 (ref 1.2–4.9)
GLOBULIN SER CALC-MCNC: 2.3 GM/DL
GLUCOSE SERPL-MCNC: 384 MG/DL (ref 65–99)
HBA1C MFR BLD: 13.7 % (ref 4.8–5.6)
HCT VFR BLD AUTO: 45 % (ref 37.5–51)
HDLC SERPL-MCNC: 31 MG/DL (ref 40–60)
HGB BLD-MCNC: 14.2 G/DL (ref 13–17.7)
IMM GRANULOCYTES # BLD AUTO: 0.02 10*3/MM3 (ref 0–0.05)
IMM GRANULOCYTES NFR BLD AUTO: 0.3 % (ref 0–0.5)
INTERPRETATION: NORMAL
LDLC SERPL CALC-MCNC: ABNORMAL MG/DL
LYMPHOCYTES # BLD AUTO: 1.1 10*3/MM3 (ref 0.7–3.1)
LYMPHOCYTES NFR BLD AUTO: 19 % (ref 19.6–45.3)
Lab: NORMAL
MCH RBC QN AUTO: 27.7 PG (ref 26.6–33)
MCHC RBC AUTO-ENTMCNC: 31.6 G/DL (ref 31.5–35.7)
MCV RBC AUTO: 87.7 FL (ref 79–97)
MICROALBUMIN UR-MCNC: 4.9 UG/ML
MONOCYTES # BLD AUTO: 0.48 10*3/MM3 (ref 0.1–0.9)
MONOCYTES NFR BLD AUTO: 8.3 % (ref 5–12)
NEUTROPHILS # BLD AUTO: 4.03 10*3/MM3 (ref 1.4–7)
NEUTROPHILS NFR BLD AUTO: 69.6 % (ref 42.7–76)
NRBC BLD AUTO-RTO: 0 /100 WBC (ref 0–0)
PLATELET # BLD AUTO: 209 10*3/MM3 (ref 140–450)
POTASSIUM SERPL-SCNC: 5.1 MMOL/L (ref 3.5–5.2)
PROT SERPL-MCNC: 6.5 G/DL (ref 6–8.5)
RBC # BLD AUTO: 5.13 10*6/MM3 (ref 4.14–5.8)
SODIUM SERPL-SCNC: 134 MMOL/L (ref 136–145)
T3FREE SERPL-MCNC: 2.1 PG/ML (ref 2–4.4)
T3RU NFR SERPL: 28 % (ref 24–39)
T4 FREE SERPL-MCNC: 1.06 NG/DL (ref 0.93–1.7)
T4 SERPL-MCNC: 5.4 UG/DL (ref 4.5–12)
TESTOST FREE SERPL-MCNC: 11.4 PG/ML (ref 6.6–18.1)
TESTOST SERPL-MCNC: 349 NG/DL (ref 264–916)
TRIGL SERPL-MCNC: 431 MG/DL (ref 0–150)
TSH SERPL DL<=0.005 MIU/L-ACNC: 3.15 UIU/ML (ref 0.45–4.5)
VLDLC SERPL CALC-MCNC: ABNORMAL MG/DL
WBC # BLD AUTO: 5.79 10*3/MM3 (ref 3.4–10.8)

## 2019-03-06 RX ORDER — TESTOSTERONE 16.2 MG/G
GEL TRANSDERMAL
Qty: 150 G | Refills: 0 | Status: SHIPPED | OUTPATIENT
Start: 2019-03-06 | End: 2019-03-12

## 2019-03-12 ENCOUNTER — OFFICE VISIT (OUTPATIENT)
Dept: ENDOCRINOLOGY | Age: 64
End: 2019-03-12

## 2019-03-12 VITALS
SYSTOLIC BLOOD PRESSURE: 126 MMHG | DIASTOLIC BLOOD PRESSURE: 78 MMHG | WEIGHT: 241 LBS | BODY MASS INDEX: 29.97 KG/M2 | HEIGHT: 75 IN

## 2019-03-12 DIAGNOSIS — E03.9 ACQUIRED HYPOTHYROIDISM: ICD-10-CM

## 2019-03-12 DIAGNOSIS — E78.2 MIXED HYPERLIPIDEMIA: ICD-10-CM

## 2019-03-12 DIAGNOSIS — IMO0002 UNCONTROLLED TYPE 2 DIABETES MELLITUS WITH COMPLICATION, WITHOUT LONG-TERM CURRENT USE OF INSULIN: ICD-10-CM

## 2019-03-12 DIAGNOSIS — E55.9 VITAMIN D DEFICIENCY: ICD-10-CM

## 2019-03-12 DIAGNOSIS — E79.0 HYPERURICEMIA: ICD-10-CM

## 2019-03-12 DIAGNOSIS — E78.1 FAMILIAL HYPERTRIGLYCERIDEMIA: ICD-10-CM

## 2019-03-12 DIAGNOSIS — I10 BENIGN ESSENTIAL HYPERTENSION: ICD-10-CM

## 2019-03-12 PROCEDURE — 99214 OFFICE O/P EST MOD 30 MIN: CPT | Performed by: NURSE PRACTITIONER

## 2019-03-12 RX ORDER — ROSUVASTATIN CALCIUM 40 MG/1
40 TABLET, COATED ORAL DAILY
Qty: 30 TABLET | Refills: 4 | Status: SHIPPED | OUTPATIENT
Start: 2019-03-12 | End: 2019-06-20 | Stop reason: SDUPTHER

## 2019-03-12 RX ORDER — ALLOPURINOL 100 MG/1
100 TABLET ORAL DAILY
Qty: 30 TABLET | Refills: 5 | Status: SHIPPED | OUTPATIENT
Start: 2019-03-12 | End: 2019-06-20 | Stop reason: SDUPTHER

## 2019-03-12 RX ORDER — ICOSAPENT ETHYL 1000 MG/1
2 CAPSULE ORAL 2 TIMES DAILY WITH MEALS
Qty: 120 CAPSULE | Refills: 3 | Status: SHIPPED | OUTPATIENT
Start: 2019-03-12 | End: 2019-06-20 | Stop reason: SDUPTHER

## 2019-03-12 RX ORDER — METOPROLOL TARTRATE 50 MG/1
50 TABLET, FILM COATED ORAL 2 TIMES DAILY
Qty: 60 TABLET | Refills: 5 | Status: SHIPPED | OUTPATIENT
Start: 2019-03-12 | End: 2019-06-20 | Stop reason: SDUPTHER

## 2019-03-12 RX ORDER — FENOFIBRATE 145 MG/1
145 TABLET, COATED ORAL DAILY
Qty: 30 TABLET | Refills: 5 | Status: SHIPPED | OUTPATIENT
Start: 2019-03-12 | End: 2019-06-20 | Stop reason: SDUPTHER

## 2019-03-12 RX ORDER — LEVOTHYROXINE SODIUM 0.07 MG/1
75 TABLET ORAL DAILY
Qty: 30 TABLET | Refills: 5 | Status: SHIPPED | OUTPATIENT
Start: 2019-03-12 | End: 2019-06-20 | Stop reason: SDUPTHER

## 2019-03-12 RX ORDER — NATEGLINIDE 120 MG/1
120 TABLET ORAL
Qty: 90 TABLET | Refills: 5 | Status: SHIPPED | OUTPATIENT
Start: 2019-03-12 | End: 2019-05-23 | Stop reason: SDUPTHER

## 2019-03-12 RX ORDER — ERGOCALCIFEROL 1.25 MG/1
50000 CAPSULE ORAL 2 TIMES WEEKLY
Qty: 36 CAPSULE | Refills: 3 | Status: SHIPPED | OUTPATIENT
Start: 2019-03-14 | End: 2019-06-20 | Stop reason: SDUPTHER

## 2019-03-12 RX ORDER — PEN NEEDLE, DIABETIC 31 G X1/4"
NEEDLE, DISPOSABLE MISCELLANEOUS
Qty: 200 EACH | Refills: 3 | Status: SHIPPED | OUTPATIENT
Start: 2019-03-12 | End: 2019-06-03

## 2019-03-12 NOTE — PROGRESS NOTES
Kem Alvarado  presents to the office  for the follow-up appointment for Type 2 diabetes mellitus.     The diabete's condition location is throughout the system with the  clinical course has fluctuated, the severity is Mild, and the modifying/allievating factors are oral medications.  Medications and  Dosages were  reviewed with Kem Alvarado and suggested that compliance most of the time.    The patient reports associated symptoms of hyperglycemia have been headache and associated symptoms of hypoglycemia have been none, with their  hypoglycemia threshold for symptoms is n/a mg/dl .     The patient is currently on insulin.    Compliance with blood glucose monitoring: good.     Meal panning: The patient is using avoidance of concentrated sweets.    The patient is currently taking home blood tests - Blood glucose testin times daily, that are:    Last instructions given were:  Start toujeo - 20 units in the AM increase 2 units daily if fasting blood sugar is over   110mg/dl.     Continue the victoza and starlix     Continue /re-start the vascepa 1g - take 2 twice daily     Re-start the tricor 145mg/ daily in the evening.     Hold the androgel and the praluent at this time.     Home blood glucose testing daily: 0    Last reported blood glucose readings are:  See upload    Patterns reported per patient are that he was without insurance for quite a few months and has not been taking any insulin since then.          The following portions of the patient's history were reviewed and updated as appropriate: current medications, past family history, past medical history, past social history, past surgical history and problem list.      Current Outpatient Medications:   •  allopurinol (ZYLOPRIM) 100 MG tablet, Take 1 tablet by mouth Daily., Disp: 30 tablet, Rfl: 5  •  amLODIPine (NORVASC) 5 MG tablet, TAKE 1 TABLET BY MOUTH 2 (TWO) TIMES A DAY., Disp: 60 tablet, Rfl: 11  •  ELIQUIS 5 MG tablet tablet, TAKE ONE  TABLET BY MOUTH TWO TIMES A DAY., Disp: 180 tablet, Rfl: 3  •  fenofibrate (TRICOR) 145 MG tablet, Take 1 tablet by mouth Daily., Disp: 30 tablet, Rfl: 5  •  glucose blood (ACCU-CHEK MARIA L) test strip, Check blood sugar 4-5 times daily, Disp: 200 each, Rfl: 5  •  Insulin Glargine 300 UNIT/ML solution pen-injector, Inject 30 Units under the skin into the appropriate area as directed Daily. Increase as instructed with a max of 100 units daily, Disp: 9 pen, Rfl: 4  •  Insulin Pen Needle (PEN NEEDLES) 31G X 6 MM misc, 5-6 injections daily, Disp: 200 each, Rfl: 3  •  levothyroxine (SYNTHROID, LEVOTHROID) 75 MCG tablet, Take 1 tablet by mouth Daily., Disp: 30 tablet, Rfl: 5  •  metoprolol tartrate (LOPRESSOR) 50 MG tablet, Take 1 tablet by mouth 2 (Two) Times a Day., Disp: 60 tablet, Rfl: 5  •  nateglinide (STARLIX) 120 MG tablet, Take 1 tablet by mouth 3 (Three) Times a Day Before Meals., Disp: 90 tablet, Rfl: 5  •  VASCEPA 1 g capsule capsule, Take 2 g by mouth 2 (Two) Times a Day With Meals., Disp: 120 capsule, Rfl: 3  •  [START ON 3/14/2019] vitamin D (ERGOCALCIFEROL) 29760 units capsule capsule, Take 1 capsule by mouth 2 (Two) Times a Week., Disp: 36 capsule, Rfl: 3  •  rosuvastatin (CRESTOR) 40 MG tablet, Take 1 tablet by mouth Daily., Disp: 30 tablet, Rfl: 4  •  Semaglutide (OZEMPIC) 1 MG/DOSE solution pen-injector, Inject 1 mg under the skin into the appropriate area as directed 1 (One) Time Per Week., Disp: 4 pen, Rfl: 4    Patient Active Problem List    Diagnosis   • Vitamin D deficiency [E55.9]   • Male hypogonadism [E29.1]   • Low testosterone [R79.89]   • Uncontrolled type 2 diabetes mellitus with complication, without long-term current use of insulin (CMS/[facility]) [E11.8, E11.65]   • Familial hypertriglyceridemia [E78.1]   • Mixed hyperlipidemia [E78.2]   • Hyperlipidemia associated with type 2 diabetes mellitus (CMS/HCC) [E11.69, E78.5]   • Atrial flutter (CMS/HCC) [I48.92]   • Benign essential hypertension  "[I10]   • Chronic kidney disease, stage III (moderate) (CMS/HCC) [N18.3]   • Controlled type 2 diabetes mellitus without complication (CMS/HCC) [E11.9]   • Hypothyroidism [E03.9]       Review of Systems   A comprehensive review of systems was negative.- 14 systems reviewed.      Objective:     Wt Readings from Last 3 Encounters:   03/12/19 109 kg (241 lb)   08/22/18 119 kg (262 lb)   05/24/18 116 kg (255 lb)     Temp Readings from Last 3 Encounters:   02/01/17 97.5 °F (36.4 °C) (Oral)   10/03/16 97.8 °F (36.6 °C)   03/17/16 97.8 °F (36.6 °C) (Oral)     BP Readings from Last 3 Encounters:   03/12/19 126/78   08/22/18 148/86   05/24/18 134/82     Pulse Readings from Last 3 Encounters:   05/24/18 76   05/23/17 76   02/01/17 83        /78   Ht 190.5 cm (75\")   Wt 109 kg (241 lb)   BMI 30.12 kg/m²        Physical Exam   Constitutional: He is oriented to person, place, and time. He appears well-developed and well-nourished. No distress.   HENT:   Head: Normocephalic and atraumatic.   Eyes: EOM are normal. Pupils are equal, round, and reactive to light.   Neck: Normal range of motion. Neck supple. No thyromegaly present.   Cardiovascular: Normal rate, regular rhythm, normal heart sounds and intact distal pulses.   No murmur heard.  Pulmonary/Chest: Effort normal and breath sounds normal.   Abdominal: Soft. Bowel sounds are normal.   Musculoskeletal: Normal range of motion.   Neurological: He is alert and oriented to person, place, and time.   Skin: Skin is warm and dry. Capillary refill takes 2 to 3 seconds. He is not diaphoretic.   Psychiatric: He has a normal mood and affect. His behavior is normal. Judgment and thought content normal.   Nursing note and vitals reviewed.          Lab Review  Results for orders placed or performed in visit on 02/28/19   Testosterone, Free, Total   Result Value Ref Range    Testosterone, Total 349 264 - 916 ng/dL    Testosterone, Free 11.4 6.6 - 18.1 pg/mL   Thyroid Panel With TSH "   Result Value Ref Range    TSH 3.150 0.450 - 4.500 uIU/mL    T4, Total 5.4 4.5 - 12.0 ug/dL    T3 Uptake 28 24 - 39 %    Free Thyroxine Index 1.5 1.2 - 4.9   Lipid Panel   Result Value Ref Range    Total Cholesterol 130 0 - 200 mg/dL    Triglycerides 431 (H) 0 - 150 mg/dL    HDL Cholesterol 31 (L) 40 - 60 mg/dL    VLDL Cholesterol CANCELED mg/dL    LDL Cholesterol  CANCELED mg/dL   T3, Free   Result Value Ref Range    T3, Free 2.1 2.0 - 4.4 pg/mL   T4, Free   Result Value Ref Range    Free T4 1.06 0.93 - 1.70 ng/dL   Vitamin D 25 Hydroxy   Result Value Ref Range    25 Hydroxy, Vitamin D 51.3 30.0 - 100.0 ng/ml   MicroAlbumin, Urine, Random - Urine, Clean Catch   Result Value Ref Range    Microalbumin, Urine 4.9 Not Estab. ug/mL   Hemoglobin A1c   Result Value Ref Range    Hemoglobin A1C 13.70 (H) 4.80 - 5.60 %   C-Peptide   Result Value Ref Range    C-Peptide 4.9 (H) 1.1 - 4.4 ng/mL   Comprehensive Metabolic Panel   Result Value Ref Range    Glucose 384 (H) 65 - 99 mg/dL    BUN 24 (H) 8 - 23 mg/dL    Creatinine 1.49 (H) 0.76 - 1.27 mg/dL    eGFR Non African Am 48 (L) >60 mL/min/1.73    eGFR African Am 58 (L) >60 mL/min/1.73    BUN/Creatinine Ratio 16.1 7.0 - 25.0    Sodium 134 (L) 136 - 145 mmol/L    Potassium 5.1 3.5 - 5.2 mmol/L    Chloride 101 98 - 107 mmol/L    Total CO2 24.8 22.0 - 29.0 mmol/L    Calcium 10.4 8.6 - 10.5 mg/dL    Total Protein 6.5 6.0 - 8.5 g/dL    Albumin 4.20 3.50 - 5.20 g/dL    Globulin 2.3 gm/dL    A/G Ratio 1.8 g/dL    Total Bilirubin 0.2 0.1 - 1.2 mg/dL    Alkaline Phosphatase 65 39 - 117 U/L    AST (SGOT) 23 1 - 40 U/L    ALT (SGPT) 21 1 - 41 U/L   Cardiovascular Risk Assessment   Result Value Ref Range    Interpretation Note    Diabetes Patient Education   Result Value Ref Range    PDF Image Not applicable    CBC & Differential   Result Value Ref Range    WBC 5.79 3.40 - 10.80 10*3/mm3    RBC 5.13 4.14 - 5.80 10*6/mm3    Hemoglobin 14.2 13.0 - 17.7 g/dL    Hematocrit 45.0 37.5 - 51.0 %     MCV 87.7 79.0 - 97.0 fL    MCH 27.7 26.6 - 33.0 pg    MCHC 31.6 31.5 - 35.7 g/dL    RDW 13.0 12.3 - 15.4 %    Platelets 209 140 - 450 10*3/mm3    Neutrophil Rel % 69.6 42.7 - 76.0 %    Lymphocyte Rel % 19.0 (L) 19.6 - 45.3 %    Monocyte Rel % 8.3 5.0 - 12.0 %    Eosinophil Rel % 1.9 0.3 - 6.2 %    Basophil Rel % 0.9 0.0 - 1.5 %    Neutrophils Absolute 4.03 1.40 - 7.00 10*3/mm3    Lymphocytes Absolute 1.10 0.70 - 3.10 10*3/mm3    Monocytes Absolute 0.48 0.10 - 0.90 10*3/mm3    Eosinophils Absolute 0.11 0.00 - 0.40 10*3/mm3    Basophils Absolute 0.05 0.00 - 0.20 10*3/mm3    Immature Granulocyte Rel % 0.3 0.0 - 0.5 %    Immature Grans Absolute 0.02 0.00 - 0.05 10*3/mm3    nRBC 0.0 0.0 - 0.0 /100 WBC           Assessment:   Kem was seen today for follow-up.    Diagnoses and all orders for this visit:    Acquired hypothyroidism  -     levothyroxine (SYNTHROID, LEVOTHROID) 75 MCG tablet; Take 1 tablet by mouth Daily.  -     Comprehensive Metabolic Panel; Future  -     TSH; Future  -     T4, Free; Future  -     Thyroid Antibodies; Future  -     T3, Free; Future    Benign essential hypertension  -     metoprolol tartrate (LOPRESSOR) 50 MG tablet; Take 1 tablet by mouth 2 (Two) Times a Day.  -     Comprehensive Metabolic Panel; Future    Familial hypertriglyceridemia  -     VASCEPA 1 g capsule capsule; Take 2 g by mouth 2 (Two) Times a Day With Meals.  -     Comprehensive Metabolic Panel; Future    Hyperuricemia  -     allopurinol (ZYLOPRIM) 100 MG tablet; Take 1 tablet by mouth Daily.  -     Comprehensive Metabolic Panel; Future    Mixed hyperlipidemia  -     rosuvastatin (CRESTOR) 40 MG tablet; Take 1 tablet by mouth Daily.  -     fenofibrate (TRICOR) 145 MG tablet; Take 1 tablet by mouth Daily.  -     Comprehensive Metabolic Panel; Future  -     Lipid Panel; Future    Uncontrolled type 2 diabetes mellitus with complication, without long-term current use of insulin (CMS/HCC)  -     Semaglutide (OZEMPIC) 1 MG/DOSE  solution pen-injector; Inject 1 mg under the skin into the appropriate area as directed 1 (One) Time Per Week.  -     Insulin Pen Needle (PEN NEEDLES) 31G X 6 MM misc; 5-6 injections daily  -     Insulin Glargine 300 UNIT/ML solution pen-injector; Inject 30 Units under the skin into the appropriate area as directed Daily. Increase as instructed with a max of 100 units daily  -     Fructosamine; Future  -     Comprehensive Metabolic Panel; Future  -     C-Peptide; Future  -     Hemoglobin A1c; Future  -     Insulin, Total; Future  -     Lipid Panel; Future  -     Microalbumin / Creatinine Urine Ratio - Urine, Clean Catch; Future  -     Uric Acid; Future  -     Vitamin D 25 Hydroxy; Future  -     TSH; Future  -     T4, Free; Future  -     Thyroid Antibodies; Future  -     T3, Free; Future  -     Hemoglobin & Hematocrit, Blood; Future  -     Follicle Stimulating Hormone; Future  -     Luteinizing Hormone; Future  -     Testosterone; Future  -     Testosterone, Free, Total; Future    Vitamin D deficiency  -     vitamin D (ERGOCALCIFEROL) 85436 units capsule capsule; Take 1 capsule by mouth 2 (Two) Times a Week.  -     Comprehensive Metabolic Panel; Future  -     Vitamin D 25 Hydroxy; Future    Other orders  -     nateglinide (STARLIX) 120 MG tablet; Take 1 tablet by mouth 3 (Three) Times a Day Before Meals.          Plan:   In summary/ Medication changes: I met with this patient is metabolically stable and doing well at this time.  Laboratory testing was reviewed dated 2.28.2019, discussed with questions and answers completed.  Discussed and formulate a treatment plan with patient, patient verbally stated understood all instructions.       Acquired hypothyroidism- chronic, stable no medication changes at this time. Refills prescribed. Future labs ordered for upcoming appointment and assessment.     -     levothyroxine (SYNTHROID, LEVOTHROID) 75 MCG tablet; Take 1 tablet by mouth Daily.    Benign essential hypertension -  chronic, stable no medication changes at this time. Refills prescribed. Future labs ordered for upcoming appointment and assessment.     -     metoprolol tartrate (LOPRESSOR) 50 MG tablet; Take 1 tablet by mouth 2 (Two) Times a Day.     Mixed hyperlipidemia / Familial hypertriglyceridemia - chronic, uncontrolled.  Medication changes were as follows  Continue the vascepa, we will start crestor 40mg nightly and stop the 20mg. Re-start tricor 145mg.    Hyperuricemia- chronic, stable no medication changes at this time. Refills prescribed. Future labs ordered for upcoming appointment and assessment.         Uncontrolled type 2 diabetes mellitus with complication, without long-term current use of insulin chronic, uncontrolled. Has been with out insurance and not take injectables  due to cost.  Medication changes were as follows:    Start toujeo - 30 units in the AM increase 2 units daily if fasting blood sugar is over   110mg/dl.    Start ozempic .25mg inj weekly x3, increase to .50mg injection weekly x3 weeks. Then advance to 1mg inj weekly for maintenance.     Continue the starlix    Vitamin D deficiency- chronic, stable no medication changes at this time. Refills prescribed. Future labs ordered for upcoming appointment and assessment.          Additional instructions:  home blood tests -  Blood glucose testing: 3 times daily, that are:  fasting- 1st thing in morning before eating or drinking  before each meal and 1 or 2 hours after meal  bedtime  anytime you feel symptoms of hyperglycemia or hypoglycemia (high or low blood sugars)        Education:  interpretation of lab results, blood sugar goals, complications of diabetes mellitus, hypoglycemia prevention and treatment, exercise, illness management, self-monitoring of blood glucose skills, nutrition and carbohydrate counting        The total face to face time spent was  25 minutes  with additional education given: 14 minutes (greater than 50% of the total time) was  spent with counseling and coordination of care on: SMBG with goals, Side effects profiles with medications, medication use and purposes,     Return in about 3 months (around 6/12/2019), or if symptoms worsen or fail to improve, for Recheck. 3 months with Analia-2 weeks prior for labs 6 months with Dr. Chahal-2 weeks prior for labs      Dragon transcription disclaimer     Much of this encounter note is an electronic transcription/translation of spoken language to printed text. The electronic translation of spoken language may permit erroneous, or at times, nonsensical words or phrases to be inadvertently transcribed. Although I have reviewed the note for such errors, some may still exist.

## 2019-03-12 NOTE — PATIENT INSTRUCTIONS
Start toujeo - 30 units in the AM increase 2 units daily if fasting blood sugar is over   110mg/dl.    Start ozempic .25mg inj weekly x3, increase to .50mg injection weekly x3 weeks. Then advance to 1mg inj weekly for maintenance.     Continue the starlix    Continue  vascepa 1g - take 2 twice daily    continue tricor 145mg/ daily in the evening.    Stop the crestor 20mg (can take 2 of the tabs until complete)   Start crestor 40mg at night      Additional instructions  home blood tests -  Blood glucose testin-3 times daily, that are:  fasting- 1st thing in morning before eating or drinking  before each meal and 1 or 2 hours after meal  bedtime  anytime you feel symptoms of hyperglycemia or hypoglycemia (high or low blood sugars)

## 2019-05-04 DIAGNOSIS — I10 BENIGN ESSENTIAL HYPERTENSION: ICD-10-CM

## 2019-05-06 RX ORDER — AMLODIPINE BESYLATE 5 MG/1
TABLET ORAL
Qty: 60 TABLET | Refills: 11 | Status: SHIPPED | OUTPATIENT
Start: 2019-05-06 | End: 2019-10-01 | Stop reason: SDUPTHER

## 2019-05-22 DIAGNOSIS — E11.9 CONTROLLED TYPE 2 DIABETES MELLITUS WITHOUT COMPLICATION, WITHOUT LONG-TERM CURRENT USE OF INSULIN (HCC): ICD-10-CM

## 2019-05-23 RX ORDER — NATEGLINIDE 120 MG/1
TABLET ORAL
Qty: 90 TABLET | Refills: 5 | Status: SHIPPED | OUTPATIENT
Start: 2019-05-23 | End: 2019-06-20

## 2019-06-03 ENCOUNTER — OFFICE VISIT (OUTPATIENT)
Dept: CARDIOLOGY | Facility: CLINIC | Age: 64
End: 2019-06-03

## 2019-06-03 VITALS
SYSTOLIC BLOOD PRESSURE: 130 MMHG | DIASTOLIC BLOOD PRESSURE: 62 MMHG | HEART RATE: 56 BPM | HEIGHT: 75 IN | BODY MASS INDEX: 30.09 KG/M2 | WEIGHT: 242 LBS | OXYGEN SATURATION: 98 %

## 2019-06-03 DIAGNOSIS — I48.92 ATRIAL FLUTTER, UNSPECIFIED TYPE (HCC): Primary | ICD-10-CM

## 2019-06-03 PROCEDURE — 99213 OFFICE O/P EST LOW 20 MIN: CPT | Performed by: PHYSICIAN ASSISTANT

## 2019-06-03 PROCEDURE — 93000 ELECTROCARDIOGRAM COMPLETE: CPT | Performed by: PHYSICIAN ASSISTANT

## 2019-06-03 NOTE — PROGRESS NOTES
Date of Office Visit: 2019  Encounter Provider: ROLA Carreon  Place of Service: [facility] CARDIOLOGY  Patient Name: Kem Alvarado  :1955    Chief Complaint   Patient presents with   • Atrial Flutter     1 year follow up   :     HPI: Kem Alvarado is a 63 y.o. male who presents today for follow-up.  Old records have been obtained and reviewed by me.  He is a patient of Dr. Myles with a past cardiac history significant for paroxysmal atrial flutter.  He has been maintained on beta-blockade as well as Eliquis.  He was last in our office to see Dr. Fair on 2018.  At that visit he was doing well without complaints of angina or heart failure.  He was not having any bleeding issues.  He was working on getting his diabetes under control.  No changes were made to his medical regimen, and is here today for yearly visit.   Past year he has been doing really well.  There was a period of time where he did not have insurance and was unable to take his diabetic medication.  His hemoglobin A1c went up to 13.7.  Now that he has better insurance he can afford his medications.  He was able to stay on Eliquis as we were helping him with samples from the office.  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  He loves his job.  He is on his feet all the time.  He does weight training 3 days a week in the gym and does cardiovascular exercise the rest of the days and can do so without difficulty.  He denies any chest pain, shortness of breath, palpitations, edema, dizziness, or syncope.  He has not had any bleeding issues or melena on the Eliquis.      Past Medical History:   Diagnosis Date   • ADHD (attention deficit hyperactivity disorder)    • Atrial flutter (CMS/HCC)    • Benign polyp of large intestine    • Diabetes mellitus (CMS/HCC)    • Hyperlipidemia    • Pancreatitis    • Type 2 diabetes mellitus  (CMS/formerly [facility])    • Vitamin D deficiency        Past Surgical History:   Procedure Laterality Date   • COLONOSCOPY     • SHOULDER SURGERY Left        Social History     Socioeconomic History   • Marital status:      Spouse name: Not on file   • Number of children: Not on file   • Years of education: Not on file   • Highest education level: Not on file   Tobacco Use   • Smoking status: Former Smoker   • Smokeless tobacco: Never Used   Substance and Sexual Activity   • Alcohol use: Yes     Alcohol/week: 0.6 oz     Types: 1 Cans of beer per week     Comment: occassional   • Drug use: No   • Sexual activity: Yes     Partners: Female     Birth control/protection: None       Family History   Problem Relation Age of Onset   • Cancer Mother    • Diabetes Mother    • Heart attack Father    • Diabetes Father    • Heart attack Sister    • Diabetes Sister    • Nephrolithiasis Brother    • Urolithiasis Other    • No Known Problems Maternal Grandmother    • No Known Problems Maternal Grandfather    • No Known Problems Paternal Grandmother    • No Known Problems Paternal Grandfather        Review of Systems   Constitution: Negative for chills, fever and malaise/fatigue.   Cardiovascular: Negative for chest pain, dyspnea on exertion, leg swelling, near-syncope, orthopnea, palpitations, paroxysmal nocturnal dyspnea and syncope.   Respiratory: Negative for cough and shortness of breath.    Musculoskeletal: Negative for joint pain, joint swelling and myalgias.   Gastrointestinal: Negative for abdominal pain, diarrhea, melena, nausea and vomiting.   Genitourinary: Negative for frequency and hematuria.   Neurological: Negative for light-headedness, numbness, paresthesias and seizures.   Allergic/Immunologic: Negative.    All other systems reviewed and are negative.      Allergies   Allergen Reactions   • Synjardy [Empagliflozin-Metformin Hcl] Other (See Comments)     CKD         Current Outpatient Medications:   •  allopurinol  "(ZYLOPRIM) 100 MG tablet, Take 1 tablet by mouth Daily., Disp: 30 tablet, Rfl: 5  •  amLODIPine (NORVASC) 5 MG tablet, TAKE 1 TABLET BY MOUTH TWO TIMES A DAY., Disp: 60 tablet, Rfl: 11  •  ELIQUIS 5 MG tablet tablet, TAKE ONE TABLET BY MOUTH TWO TIMES A DAY., Disp: 180 tablet, Rfl: 3  •  fenofibrate (TRICOR) 145 MG tablet, Take 1 tablet by mouth Daily., Disp: 30 tablet, Rfl: 5  •  glucose blood (ACCU-CHEK MARIA L) test strip, Check blood sugar 4-5 times daily, Disp: 200 each, Rfl: 5  •  levothyroxine (SYNTHROID, LEVOTHROID) 75 MCG tablet, Take 1 tablet by mouth Daily., Disp: 30 tablet, Rfl: 5  •  metoprolol tartrate (LOPRESSOR) 50 MG tablet, Take 1 tablet by mouth 2 (Two) Times a Day., Disp: 60 tablet, Rfl: 5  •  nateglinide (STARLIX) 120 MG tablet, TAKE 1 TABLET BY MOUTH THREE TIMES A DAY BEFORE MEALS., Disp: 90 tablet, Rfl: 5  •  rosuvastatin (CRESTOR) 40 MG tablet, Take 1 tablet by mouth Daily., Disp: 30 tablet, Rfl: 4  •  VASCEPA 1 g capsule capsule, Take 2 g by mouth 2 (Two) Times a Day With Meals., Disp: 120 capsule, Rfl: 3  •  vitamin D (ERGOCALCIFEROL) 12493 units capsule capsule, Take 1 capsule by mouth 2 (Two) Times a Week., Disp: 36 capsule, Rfl: 3      Objective:     Vitals:    06/03/19 1003 06/03/19 1012   BP: 140/64 130/62   BP Location: Right arm Left arm   Pulse: 56    SpO2: 98%    Weight: 110 kg (242 lb)    Height: 190.5 cm (75\")      Body mass index is 30.25 kg/m².    PHYSICAL EXAM:    Physical Exam   Constitutional: He is oriented to person, place, and time. He appears well-developed and well-nourished. No distress.   HENT:   Head: Normocephalic and atraumatic.   Eyes: Pupils are equal, round, and reactive to light.   Neck: No JVD present. No thyromegaly present.   Cardiovascular: Normal rate, regular rhythm, normal heart sounds and intact distal pulses.   No murmur heard.  Pulmonary/Chest: Effort normal and breath sounds normal. No respiratory distress.   Abdominal: Soft. Bowel sounds are normal. " He exhibits no distension. There is no splenomegaly or hepatomegaly. There is no tenderness.   Musculoskeletal: Normal range of motion. He exhibits no edema.   Neurological: He is alert and oriented to person, place, and time.   Skin: Skin is warm and dry. He is not diaphoretic. No erythema.   Psychiatric: He has a normal mood and affect. His behavior is normal. Judgment normal.         ECG 12 Lead  Date/Time: 6/3/2019 10:21 AM  Performed by: Dana Overton PA  Authorized by: Dana Overton PA   Comparison: compared with previous ECG from 5/24/2018  Similar to previous ECG  Rhythm: sinus rhythm  BPM: 57    Clinical impression: normal ECG  Comments: Indication: Paroxysmal atrial flutter.              Assessment:       Diagnosis Plan   1. Atrial flutter, unspecified type (CMS/HCC)  ECG 12 Lead     Orders Placed This Encounter   Procedures   • ECG 12 Lead     This order was created via procedure documentation          Plan:       Overall he is doing great.  His risk factor modification is excellent with the exception of the.  Of time when he did not have insurance and could not afford his medications.  I have asked him to make sure to reach out to us if this ever happens again we will help him out as much as possible.  He has no complaints of angina or heart failure and overall is doing really well.  He is much happier at his current job which is fantastic.  I am not going to make any changes, and he will follow-up with Dr. Palacio in 1 year as a transference of care from Dr. Fair.    Atrial Fibrillation and Atrial Flutter  Assessment  • The patient has paroxysmal atrial flutter  • The patient's CHADS2-VASc score is 2  • A [id number] score of 2 or more is considered a high risk for a thromboembolic event  • Apixaban prescribed    Plan  • Attempt to maintain sinus rhythm  • Continue apixaban for antithrombotic therapy, bleeding issues discussed  • Continue beta blocker for rhythm control  • Continue beta blocker for  rate control      As always, it has been a pleasure to participate in your patient's care.      Sincerely,         Dana Overton PA-C

## 2019-06-07 ENCOUNTER — LAB (OUTPATIENT)
Dept: ENDOCRINOLOGY | Age: 64
End: 2019-06-07

## 2019-06-07 DIAGNOSIS — E78.2 MIXED HYPERLIPIDEMIA: ICD-10-CM

## 2019-06-07 DIAGNOSIS — I10 BENIGN ESSENTIAL HYPERTENSION: ICD-10-CM

## 2019-06-07 DIAGNOSIS — E55.9 VITAMIN D DEFICIENCY: ICD-10-CM

## 2019-06-07 DIAGNOSIS — E78.1 FAMILIAL HYPERTRIGLYCERIDEMIA: ICD-10-CM

## 2019-06-07 DIAGNOSIS — E79.0 HYPERURICEMIA: ICD-10-CM

## 2019-06-07 DIAGNOSIS — IMO0002 UNCONTROLLED TYPE 2 DIABETES MELLITUS WITH COMPLICATION, WITHOUT LONG-TERM CURRENT USE OF INSULIN: ICD-10-CM

## 2019-06-07 DIAGNOSIS — E03.9 ACQUIRED HYPOTHYROIDISM: ICD-10-CM

## 2019-06-10 LAB
25(OH)D3+25(OH)D2 SERPL-MCNC: 54.7 NG/ML (ref 30–100)
ALBUMIN SERPL-MCNC: 4.2 G/DL (ref 3.5–5.2)
ALBUMIN/CREAT UR: 10.8 MG/G CREAT (ref 0–30)
ALBUMIN/GLOB SERPL: 1.8 G/DL
ALP SERPL-CCNC: 55 U/L (ref 39–117)
ALT SERPL-CCNC: 24 U/L (ref 1–41)
AST SERPL-CCNC: 32 U/L (ref 1–40)
BILIRUB SERPL-MCNC: 0.4 MG/DL (ref 0.2–1.2)
BUN SERPL-MCNC: 22 MG/DL (ref 8–23)
BUN/CREAT SERPL: 15.1 (ref 7–25)
C PEPTIDE SERPL-MCNC: 6 NG/ML (ref 1.1–4.4)
CALCIUM SERPL-MCNC: 10 MG/DL (ref 8.6–10.5)
CHLORIDE SERPL-SCNC: 101 MMOL/L (ref 98–107)
CHOLEST SERPL-MCNC: 110 MG/DL (ref 0–200)
CO2 SERPL-SCNC: 26.2 MMOL/L (ref 22–29)
CREAT SERPL-MCNC: 1.46 MG/DL (ref 0.76–1.27)
CREAT UR-MCNC: 56.4 MG/DL
FRUCTOSAMINE SERPL-SCNC: 409 UMOL/L (ref 0–285)
FSH SERPL-ACNC: 7 MIU/ML (ref 1.5–12.4)
GLOBULIN SER CALC-MCNC: 2.4 GM/DL
GLUCOSE SERPL-MCNC: 328 MG/DL (ref 65–99)
HBA1C MFR BLD: 11.4 % (ref 4.8–5.6)
HCT VFR BLD AUTO: 39.5 % (ref 37.5–51)
HDLC SERPL-MCNC: 30 MG/DL (ref 40–60)
HGB BLD-MCNC: 12.4 G/DL (ref 13–17.7)
INSULIN SERPL-ACNC: 16.6 UIU/ML (ref 2.6–24.9)
INTERPRETATION: NORMAL
LDLC SERPL CALC-MCNC: <5 MG/DL (ref 0–100)
LH SERPL-ACNC: 6.7 MIU/ML (ref 1.7–8.6)
Lab: NORMAL
MICROALBUMIN UR-MCNC: 6.1 UG/ML
POTASSIUM SERPL-SCNC: 4.8 MMOL/L (ref 3.5–5.2)
PROT SERPL-MCNC: 6.6 G/DL (ref 6–8.5)
SODIUM SERPL-SCNC: 138 MMOL/L (ref 136–145)
T3FREE SERPL-MCNC: 2.2 PG/ML (ref 2–4.4)
T4 FREE SERPL-MCNC: 1.23 NG/DL (ref 0.93–1.7)
TESTOST FREE SERPL-MCNC: 5.8 PG/ML (ref 6.6–18.1)
TESTOST SERPL-MCNC: 170 NG/DL (ref 264–916)
THYROGLOB AB SERPL-ACNC: <1 IU/ML (ref 0–0.9)
THYROPEROXIDASE AB SERPL-ACNC: 8 IU/ML (ref 0–34)
TRIGL SERPL-MCNC: 393 MG/DL (ref 0–150)
TSH SERPL DL<=0.005 MIU/L-ACNC: 2.57 MIU/ML (ref 0.27–4.2)
URATE SERPL-MCNC: 4.3 MG/DL (ref 3.4–7)
VLDLC SERPL CALC-MCNC: 78.6 MG/DL

## 2019-06-20 ENCOUNTER — OFFICE VISIT (OUTPATIENT)
Dept: ENDOCRINOLOGY | Age: 64
End: 2019-06-20

## 2019-06-20 VITALS
HEIGHT: 75 IN | SYSTOLIC BLOOD PRESSURE: 140 MMHG | DIASTOLIC BLOOD PRESSURE: 76 MMHG | BODY MASS INDEX: 30.04 KG/M2 | WEIGHT: 241.6 LBS

## 2019-06-20 DIAGNOSIS — E78.1 FAMILIAL HYPERTRIGLYCERIDEMIA: ICD-10-CM

## 2019-06-20 DIAGNOSIS — I10 BENIGN ESSENTIAL HYPERTENSION: ICD-10-CM

## 2019-06-20 DIAGNOSIS — E78.2 MIXED HYPERLIPIDEMIA: ICD-10-CM

## 2019-06-20 DIAGNOSIS — E03.9 ACQUIRED HYPOTHYROIDISM: ICD-10-CM

## 2019-06-20 DIAGNOSIS — E55.9 VITAMIN D DEFICIENCY: ICD-10-CM

## 2019-06-20 DIAGNOSIS — IMO0002 UNCONTROLLED TYPE 2 DIABETES MELLITUS WITH COMPLICATION, WITHOUT LONG-TERM CURRENT USE OF INSULIN: Primary | ICD-10-CM

## 2019-06-20 DIAGNOSIS — E79.0 HYPERURICEMIA: ICD-10-CM

## 2019-06-20 PROCEDURE — 99214 OFFICE O/P EST MOD 30 MIN: CPT | Performed by: NURSE PRACTITIONER

## 2019-06-20 RX ORDER — METOPROLOL TARTRATE 50 MG/1
50 TABLET, FILM COATED ORAL 2 TIMES DAILY
Qty: 60 TABLET | Refills: 5 | Status: SHIPPED | OUTPATIENT
Start: 2019-06-20 | End: 2020-01-03 | Stop reason: SDUPTHER

## 2019-06-20 RX ORDER — FENOFIBRATE 145 MG/1
145 TABLET, COATED ORAL DAILY
Qty: 30 TABLET | Refills: 5 | Status: SHIPPED | OUTPATIENT
Start: 2019-06-20 | End: 2019-10-01 | Stop reason: SDUPTHER

## 2019-06-20 RX ORDER — ERGOCALCIFEROL 1.25 MG/1
50000 CAPSULE ORAL 2 TIMES WEEKLY
Qty: 36 CAPSULE | Refills: 3 | Status: SHIPPED | OUTPATIENT
Start: 2019-06-20 | End: 2020-04-06 | Stop reason: SDUPTHER

## 2019-06-20 RX ORDER — ROSUVASTATIN CALCIUM 40 MG/1
40 TABLET, COATED ORAL DAILY
Qty: 30 TABLET | Refills: 4 | Status: SHIPPED | OUTPATIENT
Start: 2019-06-20 | End: 2019-10-01 | Stop reason: SDUPTHER

## 2019-06-20 RX ORDER — ICOSAPENT ETHYL 1000 MG/1
2 CAPSULE ORAL 2 TIMES DAILY WITH MEALS
Qty: 120 CAPSULE | Refills: 3 | Status: SHIPPED | OUTPATIENT
Start: 2019-06-20 | End: 2019-10-01 | Stop reason: SDUPTHER

## 2019-06-20 RX ORDER — ALLOPURINOL 100 MG/1
100 TABLET ORAL DAILY
Qty: 30 TABLET | Refills: 5 | Status: SHIPPED | OUTPATIENT
Start: 2019-06-20 | End: 2019-10-01 | Stop reason: SDUPTHER

## 2019-06-20 RX ORDER — LEVOTHYROXINE SODIUM 0.07 MG/1
75 TABLET ORAL DAILY
Qty: 30 TABLET | Refills: 5 | Status: SHIPPED | OUTPATIENT
Start: 2019-06-20 | End: 2019-10-01

## 2019-06-20 NOTE — PATIENT INSTRUCTIONS
Start Tresiba U2 100 injection 20 units in the morning-increase 2 units every 2 days of fasting blood glucose is over 100 mg/Xiomara.    Start xigduo extended release 1001 twice daily with breakfast and dinner or per patient's preference can take 2 with breakfast or 2 with dinner    Start Bcise 0.85 mg injection weekly    Stop Starlix    Instructions:  home blood tests -  Blood glucose testin times daily, that are:  fasting- 1st thing in morning before eating or drinking  before each meal and 1 or 2 hours after meal  bedtime  anytime you feel symptoms of hyperglycemia or hypoglycemia (high or low blood sugars)

## 2019-09-11 DIAGNOSIS — E11.9 CONTROLLED TYPE 2 DIABETES MELLITUS WITHOUT COMPLICATION, WITHOUT LONG-TERM CURRENT USE OF INSULIN (HCC): ICD-10-CM

## 2019-09-11 DIAGNOSIS — E55.9 VITAMIN D DEFICIENCY: ICD-10-CM

## 2019-09-11 DIAGNOSIS — E78.5 HYPERLIPIDEMIA ASSOCIATED WITH TYPE 2 DIABETES MELLITUS (HCC): Primary | ICD-10-CM

## 2019-09-11 DIAGNOSIS — R79.89 LOW TESTOSTERONE: ICD-10-CM

## 2019-09-11 DIAGNOSIS — E79.0 HYPERURICEMIA: ICD-10-CM

## 2019-09-11 DIAGNOSIS — E29.1 MALE HYPOGONADISM: ICD-10-CM

## 2019-09-11 DIAGNOSIS — E03.9 ACQUIRED HYPOTHYROIDISM: ICD-10-CM

## 2019-09-11 DIAGNOSIS — E78.1 FAMILIAL HYPERTRIGLYCERIDEMIA: ICD-10-CM

## 2019-09-11 DIAGNOSIS — E11.69 HYPERLIPIDEMIA ASSOCIATED WITH TYPE 2 DIABETES MELLITUS (HCC): Primary | ICD-10-CM

## 2019-09-17 ENCOUNTER — LAB (OUTPATIENT)
Dept: ENDOCRINOLOGY | Age: 64
End: 2019-09-17

## 2019-09-17 DIAGNOSIS — E29.1 MALE HYPOGONADISM: ICD-10-CM

## 2019-09-17 DIAGNOSIS — E03.9 ACQUIRED HYPOTHYROIDISM: ICD-10-CM

## 2019-09-17 DIAGNOSIS — E78.5 HYPERLIPIDEMIA ASSOCIATED WITH TYPE 2 DIABETES MELLITUS (HCC): ICD-10-CM

## 2019-09-17 DIAGNOSIS — E78.1 FAMILIAL HYPERTRIGLYCERIDEMIA: ICD-10-CM

## 2019-09-17 DIAGNOSIS — R79.89 LOW TESTOSTERONE: ICD-10-CM

## 2019-09-17 DIAGNOSIS — E79.0 HYPERURICEMIA: ICD-10-CM

## 2019-09-17 DIAGNOSIS — E78.2 MIXED HYPERLIPIDEMIA: ICD-10-CM

## 2019-09-17 DIAGNOSIS — E11.69 HYPERLIPIDEMIA ASSOCIATED WITH TYPE 2 DIABETES MELLITUS (HCC): ICD-10-CM

## 2019-09-17 DIAGNOSIS — E55.9 VITAMIN D DEFICIENCY: ICD-10-CM

## 2019-09-17 DIAGNOSIS — I10 BENIGN ESSENTIAL HYPERTENSION: ICD-10-CM

## 2019-09-17 DIAGNOSIS — IMO0002 UNCONTROLLED TYPE 2 DIABETES MELLITUS WITH COMPLICATION, WITHOUT LONG-TERM CURRENT USE OF INSULIN: ICD-10-CM

## 2019-09-17 DIAGNOSIS — E11.9 CONTROLLED TYPE 2 DIABETES MELLITUS WITHOUT COMPLICATION, WITHOUT LONG-TERM CURRENT USE OF INSULIN (HCC): ICD-10-CM

## 2019-09-18 LAB
25(OH)D3+25(OH)D2 SERPL-MCNC: 54.7 NG/ML (ref 30–100)
ALBUMIN SERPL-MCNC: 4.4 G/DL (ref 3.5–5.2)
ALBUMIN/GLOB SERPL: 1.6 G/DL
ALP SERPL-CCNC: 57 U/L (ref 39–117)
ALT SERPL-CCNC: 17 U/L (ref 1–41)
AST SERPL-CCNC: 20 U/L (ref 1–40)
BILIRUB SERPL-MCNC: 0.2 MG/DL (ref 0.2–1.2)
BUN SERPL-MCNC: 30 MG/DL (ref 8–23)
BUN/CREAT SERPL: 19.4 (ref 7–25)
C PEPTIDE SERPL-MCNC: 1.4 NG/ML (ref 1.1–4.4)
CALCIUM SERPL-MCNC: 9.7 MG/DL (ref 8.6–10.5)
CHLORIDE SERPL-SCNC: 101 MMOL/L (ref 98–107)
CHOLEST SERPL-MCNC: 188 MG/DL (ref 0–200)
CO2 SERPL-SCNC: 27 MMOL/L (ref 22–29)
CREAT SERPL-MCNC: 1.55 MG/DL (ref 0.76–1.27)
GLOBULIN SER CALC-MCNC: 2.8 GM/DL
GLUCOSE SERPL-MCNC: 151 MG/DL (ref 65–99)
HBA1C MFR BLD: 9.8 % (ref 4.8–5.6)
HCT VFR BLD AUTO: 44.3 % (ref 37.5–51)
HDLC SERPL-MCNC: 45 MG/DL (ref 40–60)
HGB BLD-MCNC: 13.6 G/DL (ref 13–17.7)
INTERPRETATION: NORMAL
LDLC SERPL CALC-MCNC: 88 MG/DL (ref 0–100)
Lab: NORMAL
MICROALBUMIN UR-MCNC: 4.8 UG/ML
POTASSIUM SERPL-SCNC: 4.2 MMOL/L (ref 3.5–5.2)
PROT SERPL-MCNC: 7.2 G/DL (ref 6–8.5)
SHBG SERPL-SCNC: 25.4 NMOL/L (ref 19.3–76.4)
SODIUM SERPL-SCNC: 143 MMOL/L (ref 136–145)
T3FREE SERPL-MCNC: 2.6 PG/ML (ref 2–4.4)
T4 FREE SERPL-MCNC: 1.24 NG/DL (ref 0.93–1.7)
T4 SERPL-MCNC: 6.36 MCG/DL (ref 4.5–11.7)
TESTOST FREE SERPL-MCNC: 8.3 PG/ML (ref 6.6–18.1)
TESTOST SERPL-MCNC: 256 NG/DL (ref 264–916)
TRIGL SERPL-MCNC: 277 MG/DL (ref 0–150)
TSH SERPL DL<=0.005 MIU/L-ACNC: 5.67 UIU/ML (ref 0.27–4.2)
URATE SERPL-MCNC: 5.4 MG/DL (ref 3.4–7)
VLDLC SERPL CALC-MCNC: 55.4 MG/DL

## 2019-09-20 ENCOUNTER — RESULTS ENCOUNTER (OUTPATIENT)
Dept: ENDOCRINOLOGY | Age: 64
End: 2019-09-20

## 2019-09-20 DIAGNOSIS — E55.9 VITAMIN D DEFICIENCY: ICD-10-CM

## 2019-09-20 DIAGNOSIS — IMO0002 UNCONTROLLED TYPE 2 DIABETES MELLITUS WITH COMPLICATION, WITHOUT LONG-TERM CURRENT USE OF INSULIN: ICD-10-CM

## 2019-09-20 DIAGNOSIS — E78.2 MIXED HYPERLIPIDEMIA: ICD-10-CM

## 2019-09-20 DIAGNOSIS — E78.1 FAMILIAL HYPERTRIGLYCERIDEMIA: ICD-10-CM

## 2019-09-20 DIAGNOSIS — E79.0 HYPERURICEMIA: ICD-10-CM

## 2019-09-20 DIAGNOSIS — I10 BENIGN ESSENTIAL HYPERTENSION: ICD-10-CM

## 2019-09-20 DIAGNOSIS — E03.9 ACQUIRED HYPOTHYROIDISM: ICD-10-CM

## 2019-10-01 ENCOUNTER — OFFICE VISIT (OUTPATIENT)
Dept: ENDOCRINOLOGY | Age: 64
End: 2019-10-01

## 2019-10-01 VITALS
DIASTOLIC BLOOD PRESSURE: 64 MMHG | WEIGHT: 256.2 LBS | HEIGHT: 75 IN | BODY MASS INDEX: 31.85 KG/M2 | SYSTOLIC BLOOD PRESSURE: 128 MMHG

## 2019-10-01 DIAGNOSIS — IMO0002 UNCONTROLLED TYPE 2 DIABETES MELLITUS WITH COMPLICATION, WITHOUT LONG-TERM CURRENT USE OF INSULIN: ICD-10-CM

## 2019-10-01 DIAGNOSIS — E29.1 MALE HYPOGONADISM: ICD-10-CM

## 2019-10-01 DIAGNOSIS — E55.9 VITAMIN D DEFICIENCY: ICD-10-CM

## 2019-10-01 DIAGNOSIS — E78.5 HYPERLIPIDEMIA ASSOCIATED WITH TYPE 2 DIABETES MELLITUS (HCC): ICD-10-CM

## 2019-10-01 DIAGNOSIS — E79.0 HYPERURICEMIA: ICD-10-CM

## 2019-10-01 DIAGNOSIS — E03.9 ACQUIRED HYPOTHYROIDISM: ICD-10-CM

## 2019-10-01 DIAGNOSIS — E78.1 FAMILIAL HYPERTRIGLYCERIDEMIA: ICD-10-CM

## 2019-10-01 DIAGNOSIS — I10 BENIGN ESSENTIAL HYPERTENSION: ICD-10-CM

## 2019-10-01 DIAGNOSIS — E11.69 HYPERLIPIDEMIA ASSOCIATED WITH TYPE 2 DIABETES MELLITUS (HCC): ICD-10-CM

## 2019-10-01 DIAGNOSIS — E78.2 MIXED HYPERLIPIDEMIA: ICD-10-CM

## 2019-10-01 DIAGNOSIS — E11.9 CONTROLLED TYPE 2 DIABETES MELLITUS WITHOUT COMPLICATION, WITHOUT LONG-TERM CURRENT USE OF INSULIN (HCC): Primary | ICD-10-CM

## 2019-10-01 PROCEDURE — 99215 OFFICE O/P EST HI 40 MIN: CPT | Performed by: INTERNAL MEDICINE

## 2019-10-01 RX ORDER — TESTOSTERONE 16.2 MG/G
GEL TRANSDERMAL
Qty: 150 G | Refills: 5 | Status: SHIPPED | OUTPATIENT
Start: 2019-10-01 | End: 2020-04-06 | Stop reason: SDUPTHER

## 2019-10-01 RX ORDER — ROSUVASTATIN CALCIUM 40 MG/1
40 TABLET, COATED ORAL DAILY
Qty: 90 TABLET | Refills: 3 | Status: SHIPPED | OUTPATIENT
Start: 2019-10-01 | End: 2019-11-25 | Stop reason: SDUPTHER

## 2019-10-01 RX ORDER — INSULIN GLARGINE 300 U/ML
90 INJECTION, SOLUTION SUBCUTANEOUS
Qty: 3 PEN | Refills: 11 | Status: SHIPPED | OUTPATIENT
Start: 2019-10-01 | End: 2020-03-19

## 2019-10-01 RX ORDER — ICOSAPENT ETHYL 1000 MG/1
2 CAPSULE ORAL 2 TIMES DAILY WITH MEALS
Qty: 360 CAPSULE | Refills: 3 | Status: SHIPPED | OUTPATIENT
Start: 2019-10-01 | End: 2020-04-06 | Stop reason: SDUPTHER

## 2019-10-01 RX ORDER — AMLODIPINE BESYLATE 5 MG/1
TABLET ORAL
Qty: 180 TABLET | Refills: 3 | Status: SHIPPED | OUTPATIENT
Start: 2019-10-01 | End: 2020-04-06 | Stop reason: SDUPTHER

## 2019-10-01 RX ORDER — ALLOPURINOL 100 MG/1
100 TABLET ORAL DAILY
Qty: 90 TABLET | Refills: 3 | Status: SHIPPED | OUTPATIENT
Start: 2019-10-01 | End: 2020-01-03 | Stop reason: SDUPTHER

## 2019-10-01 RX ORDER — LEVOTHYROXINE SODIUM 125 UG/1
125 TABLET ORAL DAILY
Qty: 30 TABLET | Refills: 11 | Status: SHIPPED | OUTPATIENT
Start: 2019-10-01 | End: 2019-10-02

## 2019-10-01 RX ORDER — EXENATIDE 2 MG/.85ML
2 INJECTION, SUSPENSION, EXTENDED RELEASE SUBCUTANEOUS WEEKLY
Qty: 4 PEN | Refills: 11 | Status: SHIPPED | OUTPATIENT
Start: 2019-10-01 | End: 2020-04-06 | Stop reason: SDUPTHER

## 2019-10-01 RX ORDER — DAPAGLIFLOZIN AND METFORMIN HYDROCHLORIDE 5; 1000 MG/1; MG/1
2 TABLET, FILM COATED, EXTENDED RELEASE ORAL DAILY
Qty: 60 TABLET | Refills: 11 | Status: SHIPPED | OUTPATIENT
Start: 2019-10-01 | End: 2019-11-30 | Stop reason: SDUPTHER

## 2019-10-01 RX ORDER — FENOFIBRATE 145 MG/1
145 TABLET, COATED ORAL DAILY
Qty: 30 TABLET | Refills: 5 | Status: SHIPPED | OUTPATIENT
Start: 2019-10-01 | End: 2020-01-03 | Stop reason: SDUPTHER

## 2019-10-01 NOTE — PROGRESS NOTES
"Subjective   Kem Alvarado is a 63 y.o. male Seen for follow up for DM2 , lab review. Patient is checking BG 2x daily.  No BG readings. Patient has swelling in rt foot and medication of tresiba's cost of medicaiton. Is too high insurance will cover toujeo or basaglar. And androgel is not covered but generic testosterone is covered.     /64   Ht 190.5 cm (75\")   Wt 116 kg (256 lb 3.2 oz)   BMI 32.02 kg/m²     Allergies   Allergen Reactions   • Synjardy [Empagliflozin-Metformin Hcl] Other (See Comments)     CKD       Current Outpatient Medications:   •  allopurinol (ZYLOPRIM) 100 MG tablet, Take 1 tablet by mouth Daily., Disp: 30 tablet, Rfl: 5  •  amLODIPine (NORVASC) 5 MG tablet, TAKE 1 TABLET BY MOUTH TWO TIMES A DAY., Disp: 60 tablet, Rfl: 11  •  dapagliflozin-metformin HCl ER (XIGDUO XR) 5-1000 MG tablet, Take 1 tablet by mouth 2 (Two) Times a Day., Disp: 60 tablet, Rfl: 4  •  ELIQUIS 5 MG tablet tablet, TAKE ONE TABLET BY MOUTH TWO TIMES A DAY., Disp: 180 tablet, Rfl: 3  •  exenatide er (BYDUREON BCISE) 2 MG/0.85ML auto-injector injection, Inject 0.85 mL under the skin into the appropriate area as directed 1 (One) Time Per Week., Disp: 4 pen, Rfl: 4  •  fenofibrate (TRICOR) 145 MG tablet, Take 1 tablet by mouth Daily., Disp: 30 tablet, Rfl: 5  •  glucose blood (ACCU-CHEK MARIA L) test strip, Check blood sugar 4-5 times daily, Disp: 200 each, Rfl: 5  •  levothyroxine (SYNTHROID, LEVOTHROID) 75 MCG tablet, Take 1 tablet by mouth Daily., Disp: 30 tablet, Rfl: 5  •  metoprolol tartrate (LOPRESSOR) 50 MG tablet, Take 1 tablet by mouth 2 (Two) Times a Day., Disp: 60 tablet, Rfl: 5  •  rosuvastatin (CRESTOR) 40 MG tablet, Take 1 tablet by mouth Daily., Disp: 30 tablet, Rfl: 4  •  VASCEPA 1 g capsule capsule, Take 2 g by mouth 2 (Two) Times a Day With Meals., Disp: 120 capsule, Rfl: 3  •  vitamin D (ERGOCALCIFEROL) 64913 units capsule capsule, Take 1 capsule by mouth 2 (Two) Times a Week., Disp: 36 capsule, " Rfl: 3  •  Insulin Degludec 200 UNIT/ML solution pen-injector, Inject 20 Units under the skin into the appropriate area as directed Daily. Titrate as instructed with max of 100 units daily, Disp: 9 pen, Rfl: 4        History of Present Illness this is a 63-year-old gentleman known patient with type 2 diabetes hypertension and dyslipidemia as well as hypothyroidism and dyslipidemia with vitamin D deficiency and hypogonadism.  Over the course of last 6 months he has had no significant health problem for which to go to the ER or hospital.    The following portions of the patient's history were reviewed and updated as appropriate: allergies, current medications, past family history, past medical history, past social history, past surgical history and problem list.    Review of Systems   Constitutional: Negative.    HENT: Negative.    Eyes: Negative.    Respiratory: Negative.    Cardiovascular: Negative.    Gastrointestinal: Negative.    Endocrine: Negative.    Musculoskeletal: Positive for joint swelling.   Skin: Negative.    Allergic/Immunologic: Negative.    Neurological: Negative.    Hematological: Negative.    Psychiatric/Behavioral: Negative.        Objective   Physical Exam   Constitutional: He is oriented to person, place, and time. He appears well-developed and well-nourished. No distress.   HENT:   Head: Normocephalic and atraumatic.   Right Ear: External ear normal.   Left Ear: External ear normal.   Nose: Nose normal.   Mouth/Throat: Oropharynx is clear and moist. No oropharyngeal exudate.   Eyes: Conjunctivae and EOM are normal. Pupils are equal, round, and reactive to light. Right eye exhibits no discharge. Left eye exhibits no discharge. No scleral icterus.   Neck: Normal range of motion. Neck supple. No JVD present. No tracheal deviation present. No thyromegaly present.   Cardiovascular: Normal rate, regular rhythm, normal heart sounds and intact distal pulses. Exam reveals no gallop and no friction rub.    No murmur heard.  Pulmonary/Chest: Effort normal and breath sounds normal. No stridor. No respiratory distress. He has no wheezes. He has no rales. He exhibits no tenderness.   Abdominal: Soft. Bowel sounds are normal. He exhibits no distension and no mass. There is no tenderness. There is no rebound and no guarding. No hernia.   Musculoskeletal: Normal range of motion. He exhibits no edema, tenderness or deformity.   Lymphadenopathy:     He has no cervical adenopathy.   Neurological: He is alert and oriented to person, place, and time. He has normal reflexes. He displays normal reflexes. No cranial nerve deficit or sensory deficit. He exhibits normal muscle tone. Coordination normal.   Skin: Skin is warm and dry. No rash noted. He is not diaphoretic. No erythema. No pallor.   Psychiatric: He has a normal mood and affect. His behavior is normal. Judgment and thought content normal.   Nursing note and vitals reviewed.     Lab Results   Component Value Date    GLUCOSE 135 (H) 09/01/2015    BUN 30 (H) 09/17/2019    CREATININE 1.55 (H) 09/17/2019    EGFRIFNONA 46 (L) 09/17/2019    EGFRIFAFRI 55 (L) 09/17/2019    BCR 19.4 09/17/2019    K 4.2 09/17/2019    CO2 27.0 09/17/2019    CALCIUM 9.7 09/17/2019    PROTENTOTREF 7.2 09/17/2019    ALBUMIN 4.40 09/17/2019    LABIL2 1.6 09/17/2019    AST 20 09/17/2019    ALT 17 09/17/2019       Lab Results   Component Value Date    HGBA1C 9.80 (H) 09/17/2019       Lab Results   Component Value Date    TSH 5.670 (H) 09/17/2019       Lab Results   Component Value Date    CHLPL 188 09/17/2019    CHLPL 110 06/07/2019    CHLPL 130 02/28/2019     Lab Results   Component Value Date    TRIG 277 (H) 09/17/2019    TRIG 393 (H) 06/07/2019    TRIG 431 (H) 02/28/2019     Lab Results   Component Value Date    HDL 45 09/17/2019    HDL 30 (L) 06/07/2019    HDL 31 (L) 02/28/2019     Lab Results   Component Value Date    LDL 88 09/17/2019    LDL <5 06/07/2019    LDL CANCELED 02/28/2019            Assessment/Plan   Diagnoses and all orders for this visit:    Controlled type 2 diabetes mellitus without complication, without long-term current use of insulin (CMS/[facility])  -     T4 & TSH ([facility]); Future  -     T4, Free; Future  -     T3, Free; Future  -     Uric Acid; Future  -     Vitamin D 25 Hydroxy; Future  -     Comprehensive Metabolic Panel; Future  -     C-Peptide; Future  -     Hemoglobin A1c; Future  -     MicroAlbumin, Urine, Random - Urine, Clean Catch; Future  -     NMR LipoProfile; Future  -     TestT+TestF+SHBG; Future  -     PSA DIAGNOSTIC; Future  -     Hemoglobin & Hematocrit, Blood; Future    Benign essential hypertension  -     rosuvastatin (CRESTOR) 40 MG tablet; Take 1 tablet by mouth Daily.  -     fenofibrate (TRICOR) 145 MG tablet; Take 1 tablet by mouth Daily.  -     T4 & TSH ([facility]); Future  -     T4, Free; Future  -     T3, Free; Future  -     Uric Acid; Future  -     Vitamin D 25 Hydroxy; Future  -     Comprehensive Metabolic Panel; Future  -     C-Peptide; Future  -     Hemoglobin A1c; Future  -     MicroAlbumin, Urine, Random - Urine, Clean Catch; Future  -     NMR LipoProfile; Future  -     amLODIPine (NORVASC) 5 MG tablet; 1 tablet by mouth twice daily  -     TestT+TestF+SHBG; Future  -     PSA DIAGNOSTIC; Future  -     Hemoglobin & Hematocrit, Blood; Future    Familial hypertriglyceridemia  -     VASCEPA 1 g capsule capsule; Take 2 g by mouth 2 (Two) Times a Day With Meals.  -     T4 & TSH ([facility]); Future  -     T4, Free; Future  -     T3, Free; Future  -     Uric Acid; Future  -     Vitamin D 25 Hydroxy; Future  -     Comprehensive Metabolic Panel; Future  -     C-Peptide; Future  -     Hemoglobin A1c; Future  -     MicroAlbumin, Urine, Random - Urine, Clean Catch; Future  -     NMR LipoProfile; Future  -     TestT+TestF+SHBG; Future  -     PSA DIAGNOSTIC; Future  -     Hemoglobin & Hematocrit, Blood; Future    Mixed hyperlipidemia  -     rosuvastatin (CRESTOR) 40 MG  tablet; Take 1 tablet by mouth Daily.  -     fenofibrate (TRICOR) 145 MG tablet; Take 1 tablet by mouth Daily.  -     T4 & TSH ([facility]); Future  -     T4, Free; Future  -     T3, Free; Future  -     Uric Acid; Future  -     Vitamin D 25 Hydroxy; Future  -     Comprehensive Metabolic Panel; Future  -     C-Peptide; Future  -     Hemoglobin A1c; Future  -     MicroAlbumin, Urine, Random - Urine, Clean Catch; Future  -     NMR LipoProfile; Future  -     TestT+TestF+SHBG; Future  -     PSA DIAGNOSTIC; Future  -     Hemoglobin & Hematocrit, Blood; Future    Vitamin D deficiency  -     T4 & TSH ([facility]); Future  -     T4, Free; Future  -     T3, Free; Future  -     Uric Acid; Future  -     Vitamin D 25 Hydroxy; Future  -     Comprehensive Metabolic Panel; Future  -     C-Peptide; Future  -     Hemoglobin A1c; Future  -     MicroAlbumin, Urine, Random - Urine, Clean Catch; Future  -     NMR LipoProfile; Future  -     TestT+TestF+SHBG; Future  -     PSA DIAGNOSTIC; Future  -     Hemoglobin & Hematocrit, Blood; Future    Male hypogonadism  -     T4 & TSH ([facility]); Future  -     T4, Free; Future  -     T3, Free; Future  -     Uric Acid; Future  -     Vitamin D 25 Hydroxy; Future  -     Comprehensive Metabolic Panel; Future  -     C-Peptide; Future  -     Hemoglobin A1c; Future  -     MicroAlbumin, Urine, Random - Urine, Clean Catch; Future  -     NMR LipoProfile; Future  -     TestT+TestF+SHBG; Future  -     PSA DIAGNOSTIC; Future  -     Hemoglobin & Hematocrit, Blood; Future    Hyperuricemia  -     allopurinol (ZYLOPRIM) 100 MG tablet; Take 1 tablet by mouth Daily.  -     T4 & TSH ([facility]); Future  -     T4, Free; Future  -     T3, Free; Future  -     Uric Acid; Future  -     Vitamin D 25 Hydroxy; Future  -     Comprehensive Metabolic Panel; Future  -     C-Peptide; Future  -     Hemoglobin A1c; Future  -     MicroAlbumin, Urine, Random - Urine, Clean Catch; Future  -     NMR LipoProfile; Future  -     TestT+TestF+SHBG;  Future  -     PSA DIAGNOSTIC; Future  -     Hemoglobin & Hematocrit, Blood; Future    Acquired hypothyroidism  -     T4 & TSH ([facility]); Future  -     T4, Free; Future  -     T3, Free; Future  -     Uric Acid; Future  -     Vitamin D 25 Hydroxy; Future  -     Comprehensive Metabolic Panel; Future  -     C-Peptide; Future  -     Hemoglobin A1c; Future  -     MicroAlbumin, Urine, Random - Urine, Clean Catch; Future  -     NMR LipoProfile; Future  -     TestT+TestF+SHBG; Future  -     PSA DIAGNOSTIC; Future  -     Hemoglobin & Hematocrit, Blood; Future    Hyperlipidemia associated with type 2 diabetes mellitus (CMS/HCC)  -     T4 & TSH ([facility]); Future  -     T4, Free; Future  -     T3, Free; Future  -     Uric Acid; Future  -     Vitamin D 25 Hydroxy; Future  -     Comprehensive Metabolic Panel; Future  -     C-Peptide; Future  -     Hemoglobin A1c; Future  -     MicroAlbumin, Urine, Random - Urine, Clean Catch; Future  -     NMR LipoProfile; Future  -     TestT+TestF+SHBG; Future  -     PSA DIAGNOSTIC; Future  -     Hemoglobin & Hematocrit, Blood; Future    Uncontrolled type 2 diabetes mellitus with complication, without long-term current use of insulin (CMS/[facility])  -     BYDUREON BCISE 2 MG/0.85ML auto-injector injection; Inject 0.85 mL under the skin into the appropriate area as directed 1 (One) Time Per Week.  -     XIGDUO XR 5-1000 MG tablet; Take 2 tablets by mouth Daily.  -     T4 & TSH ([facility]); Future  -     T4, Free; Future  -     T3, Free; Future  -     Uric Acid; Future  -     Vitamin D 25 Hydroxy; Future  -     Comprehensive Metabolic Panel; Future  -     C-Peptide; Future  -     Hemoglobin A1c; Future  -     MicroAlbumin, Urine, Random - Urine, Clean Catch; Future  -     NMR LipoProfile; Future  -     TestT+TestF+SHBG; Future  -     PSA DIAGNOSTIC; Future  -     Hemoglobin & Hematocrit, Blood; Future    Other orders  -     UNITHROID 125 MCG tablet; Take 1 tablet by mouth Daily.  -     TOUJEO MAX SOLOSTAR  300 UNIT/ML solution pen-injector injection; Inject 90 Units under the skin into the appropriate area as directed Daily With Breakfast.  -     Testosterone 20.25 MG/ACT (1.62%) gel; 2 pump actuation on each shoulder daily.      Is summary I saw and examined this 63-year-old gentleman for above-mentioned problems.  I reviewed his laboratory evaluation of 9/17/2019 and provided him with a hard copy of it.  He is a hemoglobin A1c is high at 9.80 and his TSH is 5.670 and triglyceride high at 277.  I am going to go ahead and start him on Toujeo 60 units every morning and instructed him to increase the dose by 2 units every 3 days if his fasting blood glucose is greater than 110.  I am also giving him a prescription for Unithroid 125 mcg daily and provided him with a co-pay reduction card's to reduce his cost and increase his compliance.  This office visit lasted 45 minutes of which 30 minutes was a spent on face-to-face counseling and education and planning his care moving forward.  He will see Ms. Analiaceleste Bang in 4 months or sooner if needed with laboratory evaluation prior to each office visit.

## 2019-10-02 ENCOUNTER — TELEPHONE (OUTPATIENT)
Dept: ENDOCRINOLOGY | Age: 64
End: 2019-10-02

## 2019-10-02 RX ORDER — LEVOTHYROXINE SODIUM 125 MCG
125 TABLET ORAL DAILY
Qty: 90 TABLET | Refills: 3 | Status: SHIPPED | OUTPATIENT
Start: 2019-10-02 | End: 2019-11-27 | Stop reason: SDUPTHER

## 2019-10-02 NOTE — TELEPHONE ENCOUNTER
CAN MEDICATION BE SWITCHED TO SYNTHROID 125 MCG    WAS SENT IN AS UNITHROID      [facility] SAID YOU CAN CALL [facility] WITH A VERBAL    [phone]

## 2019-11-25 DIAGNOSIS — E78.2 MIXED HYPERLIPIDEMIA: ICD-10-CM

## 2019-11-25 DIAGNOSIS — I10 BENIGN ESSENTIAL HYPERTENSION: ICD-10-CM

## 2019-11-25 RX ORDER — ROSUVASTATIN CALCIUM 40 MG/1
40 TABLET, COATED ORAL DAILY
Qty: 30 TABLET | Refills: 4 | Status: SHIPPED | OUTPATIENT
Start: 2019-11-25 | End: 2020-04-06 | Stop reason: SDUPTHER

## 2019-11-27 RX ORDER — LEVOTHYROXINE SODIUM 125 MCG
125 TABLET ORAL DAILY
Qty: 90 TABLET | Refills: 1 | Status: SHIPPED | OUTPATIENT
Start: 2019-11-27 | End: 2020-04-06 | Stop reason: SDUPTHER

## 2019-11-30 DIAGNOSIS — IMO0002 UNCONTROLLED TYPE 2 DIABETES MELLITUS WITH COMPLICATION, WITHOUT LONG-TERM CURRENT USE OF INSULIN: ICD-10-CM

## 2019-12-02 DIAGNOSIS — IMO0002 UNCONTROLLED TYPE 2 DIABETES MELLITUS WITH COMPLICATION, WITHOUT LONG-TERM CURRENT USE OF INSULIN: ICD-10-CM

## 2019-12-02 RX ORDER — DAPAGLIFLOZIN AND METFORMIN HYDROCHLORIDE 5; 1000 MG/1; MG/1
TABLET, FILM COATED, EXTENDED RELEASE ORAL
Qty: 60 TABLET | Refills: 11 | Status: SHIPPED | OUTPATIENT
Start: 2019-12-02 | End: 2019-12-02 | Stop reason: SDUPTHER

## 2019-12-02 RX ORDER — DAPAGLIFLOZIN AND METFORMIN HYDROCHLORIDE 5; 1000 MG/1; MG/1
TABLET, FILM COATED, EXTENDED RELEASE ORAL
Qty: 60 TABLET | Refills: 11 | Status: SHIPPED | OUTPATIENT
Start: 2019-12-02 | End: 2020-03-17

## 2020-01-03 DIAGNOSIS — E78.2 MIXED HYPERLIPIDEMIA: ICD-10-CM

## 2020-01-03 DIAGNOSIS — E79.0 HYPERURICEMIA: ICD-10-CM

## 2020-01-03 DIAGNOSIS — I10 BENIGN ESSENTIAL HYPERTENSION: ICD-10-CM

## 2020-01-03 RX ORDER — ALLOPURINOL 100 MG/1
100 TABLET ORAL DAILY
Qty: 90 TABLET | Refills: 0 | Status: SHIPPED | OUTPATIENT
Start: 2020-01-03 | End: 2020-04-21 | Stop reason: SDUPTHER

## 2020-01-03 RX ORDER — FENOFIBRATE 145 MG/1
145 TABLET, COATED ORAL DAILY
Qty: 90 TABLET | Refills: 0 | Status: SHIPPED | OUTPATIENT
Start: 2020-01-03 | End: 2020-03-31

## 2020-01-03 RX ORDER — METOPROLOL TARTRATE 50 MG/1
50 TABLET, FILM COATED ORAL 2 TIMES DAILY
Qty: 180 TABLET | Refills: 0 | Status: SHIPPED | OUTPATIENT
Start: 2020-01-03 | End: 2020-03-31

## 2020-01-24 ENCOUNTER — RESULTS ENCOUNTER (OUTPATIENT)
Dept: ENDOCRINOLOGY | Age: 65
End: 2020-01-24

## 2020-01-24 DIAGNOSIS — E55.9 VITAMIN D DEFICIENCY: ICD-10-CM

## 2020-01-24 DIAGNOSIS — E03.9 ACQUIRED HYPOTHYROIDISM: ICD-10-CM

## 2020-01-24 DIAGNOSIS — E11.69 HYPERLIPIDEMIA ASSOCIATED WITH TYPE 2 DIABETES MELLITUS (HCC): ICD-10-CM

## 2020-01-24 DIAGNOSIS — IMO0002 UNCONTROLLED TYPE 2 DIABETES MELLITUS WITH COMPLICATION, WITHOUT LONG-TERM CURRENT USE OF INSULIN: ICD-10-CM

## 2020-01-24 DIAGNOSIS — I10 BENIGN ESSENTIAL HYPERTENSION: ICD-10-CM

## 2020-01-24 DIAGNOSIS — E79.0 HYPERURICEMIA: ICD-10-CM

## 2020-01-24 DIAGNOSIS — E78.5 HYPERLIPIDEMIA ASSOCIATED WITH TYPE 2 DIABETES MELLITUS (HCC): ICD-10-CM

## 2020-01-24 DIAGNOSIS — E78.1 FAMILIAL HYPERTRIGLYCERIDEMIA: ICD-10-CM

## 2020-01-24 DIAGNOSIS — E11.9 CONTROLLED TYPE 2 DIABETES MELLITUS WITHOUT COMPLICATION, WITHOUT LONG-TERM CURRENT USE OF INSULIN (HCC): ICD-10-CM

## 2020-01-24 DIAGNOSIS — E78.2 MIXED HYPERLIPIDEMIA: ICD-10-CM

## 2020-01-24 DIAGNOSIS — E29.1 MALE HYPOGONADISM: ICD-10-CM

## 2020-03-09 DIAGNOSIS — IMO0002 UNCONTROLLED TYPE 2 DIABETES MELLITUS WITH COMPLICATION, WITHOUT LONG-TERM CURRENT USE OF INSULIN: ICD-10-CM

## 2020-03-17 RX ORDER — CANAGLIFLOZIN AND METFORMIN HYDROCHLORIDE 150; 1000 MG/1; MG/1
2 TABLET, FILM COATED, EXTENDED RELEASE ORAL
Qty: 60 TABLET | Refills: 11 | Status: SHIPPED | OUTPATIENT
Start: 2020-03-17 | End: 2020-04-06 | Stop reason: SDUPTHER

## 2020-03-18 ENCOUNTER — TELEPHONE (OUTPATIENT)
Dept: ENDOCRINOLOGY | Age: 65
End: 2020-03-18

## 2020-03-18 NOTE — TELEPHONE ENCOUNTER
Pt calling on has new insurance needs a pa on both meds Toujeo / Xigduo has passport insurance now and is out of meds please call pt when completed needs sent into is pharmacy verified correct pharmacy in system

## 2020-03-19 ENCOUNTER — TELEPHONE (OUTPATIENT)
Dept: ENDOCRINOLOGY | Age: 65
End: 2020-03-19

## 2020-03-19 RX ORDER — INSULIN GLARGINE 100 [IU]/ML
90 INJECTION, SOLUTION SUBCUTANEOUS DAILY
Qty: 9 PEN | Refills: 11 | Status: SHIPPED | OUTPATIENT
Start: 2020-03-19 | End: 2020-04-06 | Stop reason: SDUPTHER

## 2020-03-19 NOTE — TELEPHONE ENCOUNTER
Left voicemail and asked for a return call to the office.        ----- Message from Janny Gaxiola MA sent at 3/18/2020  4:52 PM EDT -----  Please call patient in regards to patient needing pa for medication toujeo and xidgeo  Patient is out and is needing samples

## 2020-03-23 ENCOUNTER — TELEPHONE (OUTPATIENT)
Dept: ENDOCRINOLOGY | Age: 65
End: 2020-03-23

## 2020-03-31 ENCOUNTER — TELEPHONE (OUTPATIENT)
Dept: ENDOCRINOLOGY | Age: 65
End: 2020-03-31

## 2020-03-31 DIAGNOSIS — I10 BENIGN ESSENTIAL HYPERTENSION: ICD-10-CM

## 2020-03-31 DIAGNOSIS — E78.2 MIXED HYPERLIPIDEMIA: ICD-10-CM

## 2020-03-31 RX ORDER — FENOFIBRATE 145 MG/1
145 TABLET, COATED ORAL DAILY
Qty: 90 TABLET | Refills: 0 | Status: SHIPPED | OUTPATIENT
Start: 2020-03-31 | End: 2020-04-06 | Stop reason: SDUPTHER

## 2020-03-31 RX ORDER — METOPROLOL TARTRATE 50 MG/1
50 TABLET, FILM COATED ORAL 2 TIMES DAILY
Qty: 180 TABLET | Refills: 0 | Status: SHIPPED | OUTPATIENT
Start: 2020-03-31 | End: 2020-04-30

## 2020-04-06 ENCOUNTER — TELEPHONE (OUTPATIENT)
Dept: ENDOCRINOLOGY | Age: 65
End: 2020-04-06

## 2020-04-06 DIAGNOSIS — I10 BENIGN ESSENTIAL HYPERTENSION: ICD-10-CM

## 2020-04-06 DIAGNOSIS — R79.89 LOW TESTOSTERONE IN MALE: Primary | ICD-10-CM

## 2020-04-06 DIAGNOSIS — E78.1 FAMILIAL HYPERTRIGLYCERIDEMIA: ICD-10-CM

## 2020-04-06 DIAGNOSIS — E78.2 MIXED HYPERLIPIDEMIA: ICD-10-CM

## 2020-04-06 DIAGNOSIS — E55.9 VITAMIN D DEFICIENCY: ICD-10-CM

## 2020-04-06 DIAGNOSIS — IMO0002 UNCONTROLLED TYPE 2 DIABETES MELLITUS WITH COMPLICATION, WITHOUT LONG-TERM CURRENT USE OF INSULIN: ICD-10-CM

## 2020-04-06 RX ORDER — ROSUVASTATIN CALCIUM 40 MG/1
40 TABLET, COATED ORAL DAILY
Qty: 90 TABLET | Refills: 3 | Status: SHIPPED | OUTPATIENT
Start: 2020-04-06 | End: 2020-04-07

## 2020-04-06 RX ORDER — ICOSAPENT ETHYL 1000 MG/1
2 CAPSULE ORAL 2 TIMES DAILY WITH MEALS
Qty: 360 CAPSULE | Refills: 3 | Status: SHIPPED | OUTPATIENT
Start: 2020-04-06 | End: 2020-04-21 | Stop reason: SDUPTHER

## 2020-04-06 RX ORDER — AMLODIPINE BESYLATE 5 MG/1
TABLET ORAL
Qty: 180 TABLET | Refills: 3 | Status: SHIPPED | OUTPATIENT
Start: 2020-04-06 | End: 2020-04-21 | Stop reason: SDUPTHER

## 2020-04-06 RX ORDER — LEVOTHYROXINE SODIUM 125 MCG
125 TABLET ORAL DAILY
Qty: 90 TABLET | Refills: 1 | Status: SHIPPED | OUTPATIENT
Start: 2020-04-06 | End: 2020-04-21 | Stop reason: SDUPTHER

## 2020-04-06 RX ORDER — ERGOCALCIFEROL 1.25 MG/1
50000 CAPSULE ORAL 2 TIMES WEEKLY
Qty: 36 CAPSULE | Refills: 3 | Status: SHIPPED | OUTPATIENT
Start: 2020-04-06 | End: 2020-04-21 | Stop reason: SDUPTHER

## 2020-04-06 RX ORDER — INSULIN GLARGINE 100 [IU]/ML
90 INJECTION, SOLUTION SUBCUTANEOUS DAILY
Qty: 9 PEN | Refills: 11 | Status: SHIPPED | OUTPATIENT
Start: 2020-04-06 | End: 2020-04-21 | Stop reason: SDUPTHER

## 2020-04-06 RX ORDER — EXENATIDE 2 MG/.85ML
2 INJECTION, SUSPENSION, EXTENDED RELEASE SUBCUTANEOUS WEEKLY
Qty: 4 PEN | Refills: 11 | Status: SHIPPED | OUTPATIENT
Start: 2020-04-06 | End: 2020-04-21 | Stop reason: SDUPTHER

## 2020-04-06 RX ORDER — TESTOSTERONE 16.2 MG/G
GEL TRANSDERMAL
Qty: 150 G | Refills: 5 | Status: SHIPPED | OUTPATIENT
Start: 2020-04-06 | End: 2020-04-21 | Stop reason: SDUPTHER

## 2020-04-06 RX ORDER — FENOFIBRATE 145 MG/1
145 TABLET, COATED ORAL DAILY
Qty: 90 TABLET | Refills: 0 | Status: SHIPPED | OUTPATIENT
Start: 2020-04-06 | End: 2020-04-21 | Stop reason: SDUPTHER

## 2020-04-06 RX ORDER — CANAGLIFLOZIN AND METFORMIN HYDROCHLORIDE 150; 1000 MG/1; MG/1
2 TABLET, FILM COATED, EXTENDED RELEASE ORAL
Qty: 60 TABLET | Refills: 11 | Status: SHIPPED | OUTPATIENT
Start: 2020-04-06 | End: 2020-04-07

## 2020-04-06 NOTE — TELEPHONE ENCOUNTER
Pt calling and sent my chart message his insurance is not paying for the Invokamet XR and we have no samples pt was wanting to switch to another med if ok please advise also Rosuvastain has been denied for pa pt out of meds please advise

## 2020-04-07 RX ORDER — ERTUGLIFLOZIN AND METFORMIN HYDROCHLORIDE 7.5; 1 MG/1; MG/1
2 TABLET, FILM COATED ORAL
Qty: 60 TABLET | Refills: 11 | Status: SHIPPED | OUTPATIENT
Start: 2020-04-07 | End: 2020-04-21 | Stop reason: SDUPTHER

## 2020-04-07 RX ORDER — ATORVASTATIN CALCIUM 80 MG/1
80 TABLET, FILM COATED ORAL DAILY
Qty: 90 TABLET | Refills: 3 | Status: SHIPPED | OUTPATIENT
Start: 2020-04-07 | End: 2020-04-21 | Stop reason: SDUPTHER

## 2020-04-08 LAB
25(OH)D3+25(OH)D2 SERPL-MCNC: 85.1 NG/ML (ref 30–100)
ALBUMIN SERPL-MCNC: 3.9 G/DL (ref 3.5–5.2)
ALBUMIN/GLOB SERPL: 1.4 G/DL
ALP SERPL-CCNC: 54 U/L (ref 39–117)
ALT SERPL-CCNC: 30 U/L (ref 1–41)
AST SERPL-CCNC: 27 U/L (ref 1–40)
BILIRUB SERPL-MCNC: 0.3 MG/DL (ref 0.2–1.2)
BUN SERPL-MCNC: 17 MG/DL (ref 8–23)
BUN/CREAT SERPL: 14.7 (ref 7–25)
C PEPTIDE SERPL-MCNC: 1.7 NG/ML (ref 1.1–4.4)
CALCIUM SERPL-MCNC: 9.7 MG/DL (ref 8.6–10.5)
CHLORIDE SERPL-SCNC: 100 MMOL/L (ref 98–107)
CHOLEST SERPL-MCNC: 195 MG/DL (ref 100–199)
CO2 SERPL-SCNC: 27.5 MMOL/L (ref 22–29)
CREAT SERPL-MCNC: 1.16 MG/DL (ref 0.76–1.27)
GLOBULIN SER CALC-MCNC: 2.7 GM/DL
GLUCOSE SERPL-MCNC: 214 MG/DL (ref 65–99)
HBA1C MFR BLD: 9.8 % (ref 4.8–5.6)
HCT VFR BLD AUTO: 42.5 % (ref 37.5–51)
HDL SERPL-SCNC: 39.3 UMOL/L
HDLC SERPL-MCNC: 54 MG/DL
HGB BLD-MCNC: 13.9 G/DL (ref 13–17.7)
LDL SERPL QN: 20.6 NM
LDL SERPL-SCNC: 1316 NMOL/L
LDL SMALL SERPL-SCNC: 730 NMOL/L
LDLC SERPL CALC-MCNC: 94 MG/DL (ref 0–99)
MICROALBUMIN UR-MCNC: 27.2 UG/ML
POTASSIUM SERPL-SCNC: 4.8 MMOL/L (ref 3.5–5.2)
PROT SERPL-MCNC: 6.6 G/DL (ref 6–8.5)
PSA SERPL-MCNC: 0.21 NG/ML (ref 0–4)
SHBG SERPL-SCNC: 30.7 NMOL/L (ref 19.3–76.4)
SODIUM SERPL-SCNC: 139 MMOL/L (ref 136–145)
T3FREE SERPL-MCNC: 2.6 PG/ML (ref 2–4.4)
T4 FREE SERPL-MCNC: 1.25 NG/DL (ref 0.93–1.7)
T4 SERPL-MCNC: 6.14 MCG/DL (ref 4.5–11.7)
TESTOST FREE SERPL-MCNC: 10.4 PG/ML (ref 6.6–18.1)
TESTOST SERPL-MCNC: 315 NG/DL (ref 264–916)
TRIGL SERPL-MCNC: 235 MG/DL (ref 0–149)
TSH SERPL DL<=0.005 MIU/L-ACNC: 2.02 UIU/ML (ref 0.27–4.2)
URATE SERPL-MCNC: 4.1 MG/DL (ref 3.4–7)

## 2020-04-21 ENCOUNTER — TELEMEDICINE - AUDIO (OUTPATIENT)
Dept: ENDOCRINOLOGY | Age: 65
End: 2020-04-21

## 2020-04-21 DIAGNOSIS — R79.89 LOW TESTOSTERONE: ICD-10-CM

## 2020-04-21 DIAGNOSIS — E78.2 MIXED HYPERLIPIDEMIA: ICD-10-CM

## 2020-04-21 DIAGNOSIS — E55.9 VITAMIN D DEFICIENCY: ICD-10-CM

## 2020-04-21 DIAGNOSIS — IMO0002 UNCONTROLLED TYPE 2 DIABETES MELLITUS WITH COMPLICATION, WITHOUT LONG-TERM CURRENT USE OF INSULIN: ICD-10-CM

## 2020-04-21 DIAGNOSIS — E79.0 HYPERURICEMIA: ICD-10-CM

## 2020-04-21 DIAGNOSIS — R79.89 LOW TESTOSTERONE IN MALE: ICD-10-CM

## 2020-04-21 DIAGNOSIS — E03.8 HYPOTHYROIDISM DUE TO HASHIMOTO'S THYROIDITIS: ICD-10-CM

## 2020-04-21 DIAGNOSIS — I10 BENIGN ESSENTIAL HYPERTENSION: ICD-10-CM

## 2020-04-21 DIAGNOSIS — E78.1 FAMILIAL HYPERTRIGLYCERIDEMIA: ICD-10-CM

## 2020-04-21 DIAGNOSIS — E78.5 DYSLIPIDEMIA: ICD-10-CM

## 2020-04-21 DIAGNOSIS — IMO0002 DIABETES MELLITUS TYPE 2, UNCONTROLLED, WITH COMPLICATIONS: Primary | ICD-10-CM

## 2020-04-21 DIAGNOSIS — E06.3 HYPOTHYROIDISM DUE TO HASHIMOTO'S THYROIDITIS: ICD-10-CM

## 2020-04-21 PROCEDURE — 99214 OFFICE O/P EST MOD 30 MIN: CPT | Performed by: INTERNAL MEDICINE

## 2020-04-21 RX ORDER — ERGOCALCIFEROL 1.25 MG/1
50000 CAPSULE ORAL 2 TIMES WEEKLY
Qty: 36 CAPSULE | Refills: 3 | Status: SHIPPED | OUTPATIENT
Start: 2020-04-23 | End: 2021-06-15

## 2020-04-21 RX ORDER — INSULIN GLARGINE 100 [IU]/ML
90 INJECTION, SOLUTION SUBCUTANEOUS DAILY
Qty: 9 PEN | Refills: 3 | Status: SHIPPED | OUTPATIENT
Start: 2020-04-21 | End: 2020-09-25

## 2020-04-21 RX ORDER — EXENATIDE 2 MG/.85ML
2 INJECTION, SUSPENSION, EXTENDED RELEASE SUBCUTANEOUS WEEKLY
Qty: 12 PEN | Refills: 3 | Status: SHIPPED | OUTPATIENT
Start: 2020-04-21 | End: 2020-10-30 | Stop reason: CLARIF

## 2020-04-21 RX ORDER — ICOSAPENT ETHYL 1000 MG/1
2 CAPSULE ORAL 2 TIMES DAILY WITH MEALS
Qty: 360 CAPSULE | Refills: 3 | Status: SHIPPED | OUTPATIENT
Start: 2020-04-21 | End: 2021-04-19

## 2020-04-21 RX ORDER — AMLODIPINE BESYLATE 5 MG/1
TABLET ORAL
Qty: 180 TABLET | Refills: 3 | Status: SHIPPED | OUTPATIENT
Start: 2020-04-21 | End: 2021-07-06 | Stop reason: SDUPTHER

## 2020-04-21 RX ORDER — FENOFIBRATE 145 MG/1
145 TABLET, COATED ORAL DAILY
Qty: 90 TABLET | Refills: 3 | Status: SHIPPED | OUTPATIENT
Start: 2020-04-21 | End: 2020-09-16

## 2020-04-21 RX ORDER — ERTUGLIFLOZIN AND METFORMIN HYDROCHLORIDE 7.5; 1 MG/1; MG/1
1 TABLET, FILM COATED ORAL 2 TIMES DAILY
Qty: 60 TABLET | Refills: 11 | Status: SHIPPED | OUTPATIENT
Start: 2020-04-21 | End: 2021-03-12

## 2020-04-21 RX ORDER — TESTOSTERONE 16.2 MG/G
GEL TRANSDERMAL
Qty: 150 G | Refills: 5 | Status: SHIPPED | OUTPATIENT
Start: 2020-04-21 | End: 2020-10-30

## 2020-04-21 RX ORDER — LEVOTHYROXINE SODIUM 125 MCG
125 TABLET ORAL DAILY
Qty: 90 TABLET | Refills: 3 | Status: SHIPPED | OUTPATIENT
Start: 2020-04-21 | End: 2021-07-16

## 2020-04-21 RX ORDER — ALLOPURINOL 100 MG/1
100 TABLET ORAL DAILY
Qty: 90 TABLET | Refills: 3 | Status: SHIPPED | OUTPATIENT
Start: 2020-04-21

## 2020-04-21 RX ORDER — ATORVASTATIN CALCIUM 80 MG/1
80 TABLET, FILM COATED ORAL DAILY
Qty: 90 TABLET | Refills: 3 | Status: SHIPPED | OUTPATIENT
Start: 2020-04-21 | End: 2021-03-26

## 2020-04-21 NOTE — PROGRESS NOTES
Subjective   Kem Alvarado is a 64 y.o. male    History of Present Illness this is a 64-year-old gentleman known patient with type 2 diabetes hypertension and dyslipidemia as well as hypogonadism and hypothyroid and hyperuricemia.  Over the course of last 6 months he has had no significant health problem for which to go to the emergency room or hospital.  However he is expecting his hemoglobin A1c to be high for the reason and that he has not been able to be given by ivania and Segluromet and the rest of his prescriptions for his diabetes which need to be prior authorized despite the fact that everyone of these medications are on passport formulary.    The following portions of the patient's history were reviewed and updated as appropriate: allergies, current medications, past family history, past medical history, past social history, past surgical history and problem list.    Review of Systems  Review of medicine was reviewed, corroborated and accepted.  Objective   Physical Exam  During this video encounter patient was not in any acute distress.  He was alert and oriented and coherent and articulate.    Assessment/Plan   Kem was seen today for diabetes, hyperlipidemia and vitamin d deficiency.    Diagnoses and all orders for this visit:    Diabetes mellitus type 2, uncontrolled, with complications (CMS/[facility])  -     T4 & TSH ([facility]); Future  -     T3, Free; Future  -     T4, Free; Future  -     TestT+TestF+SHBG; Future  -     Uric Acid; Future  -     Vitamin D 25 Hydroxy; Future  -     Comprehensive Metabolic Panel; Future  -     C-Peptide; Future  -     Hemoglobin A1c; Future  -     MicroAlbumin, Urine, Random - Urine, Clean Catch; Future  -     NMR LipoProfile; Future  -     PSA DIAGNOSTIC; Future  -     Hemoglobin & Hematocrit, Blood; Future    Hypothyroidism due to Hashimoto's thyroiditis  -     T4 & TSH ([facility]); Future  -     T3, Free; Future  -     T4, Free; Future  -     TestT+TestF+SHBG;  Future  -     Uric Acid; Future  -     Vitamin D 25 Hydroxy; Future  -     Comprehensive Metabolic Panel; Future  -     C-Peptide; Future  -     Hemoglobin A1c; Future  -     MicroAlbumin, Urine, Random - Urine, Clean Catch; Future  -     NMR LipoProfile; Future  -     PSA DIAGNOSTIC; Future  -     Hemoglobin & Hematocrit, Blood; Future    Dyslipidemia  -     T4 & TSH ([facility]); Future  -     T3, Free; Future  -     T4, Free; Future  -     TestT+TestF+SHBG; Future  -     Uric Acid; Future  -     Vitamin D 25 Hydroxy; Future  -     Comprehensive Metabolic Panel; Future  -     C-Peptide; Future  -     Hemoglobin A1c; Future  -     MicroAlbumin, Urine, Random - Urine, Clean Catch; Future  -     NMR LipoProfile; Future  -     PSA DIAGNOSTIC; Future  -     Hemoglobin & Hematocrit, Blood; Future    Low testosterone  -     T4 & TSH ([facility]); Future  -     T3, Free; Future  -     T4, Free; Future  -     TestT+TestF+SHBG; Future  -     Uric Acid; Future  -     Vitamin D 25 Hydroxy; Future  -     Comprehensive Metabolic Panel; Future  -     C-Peptide; Future  -     Hemoglobin A1c; Future  -     MicroAlbumin, Urine, Random - Urine, Clean Catch; Future  -     NMR LipoProfile; Future  -     PSA DIAGNOSTIC; Future  -     Hemoglobin & Hematocrit, Blood; Future    Hyperuricemia  -     T4 & TSH ([facility]); Future  -     T3, Free; Future  -     T4, Free; Future  -     TestT+TestF+SHBG; Future  -     Uric Acid; Future  -     Vitamin D 25 Hydroxy; Future  -     Comprehensive Metabolic Panel; Future  -     C-Peptide; Future  -     Hemoglobin A1c; Future  -     MicroAlbumin, Urine, Random - Urine, Clean Catch; Future  -     NMR LipoProfile; Future  -     PSA DIAGNOSTIC; Future  -     Hemoglobin & Hematocrit, Blood; Future  -     allopurinol (ZYLOPRIM) 100 MG tablet; Take 1 tablet by mouth Daily.    Benign essential hypertension  -     amLODIPine (NORVASC) 5 MG tablet; 1 tablet by mouth twice daily  -     fenofibrate (TRICOR) 145 MG  tablet; Take 1 tablet by mouth Daily.    Uncontrolled type 2 diabetes mellitus with complication, without long-term current use of insulin (CMS/[facility])  -     BYDUREON BCISE 2 MG/0.85ML auto-injector injection; Inject 0.85 mL under the skin into the appropriate area as directed 1 (One) Time Per Week.    Mixed hyperlipidemia  -     fenofibrate (TRICOR) 145 MG tablet; Take 1 tablet by mouth Daily.    Low testosterone in male  -     Testosterone 20.25 MG/ACT (1.62%) gel; 2 pump actuation on each shoulder daily.    Familial hypertriglyceridemia  -     VASCEPA 1 g capsule capsule; Take 2 g by mouth 2 (Two) Times a Day With Meals.    Vitamin D deficiency  -     vitamin D (ERGOCALCIFEROL) 1.25 MG (23043 UT) capsule capsule; Take 1 capsule by mouth 2 (Two) Times a Week.    Other orders  -     atorvastatin (Lipitor) 80 MG tablet; Take 1 tablet by mouth Daily.  -     Insulin Glargine (BASAGLAR KWIKPEN) 100 UNIT/ML injection pen; Inject 90 Units under the skin into the appropriate area as directed Daily.  -     SEGLUROMET 7.5-1000 MG tablet; Take 1 tablet by mouth 2 (Two) Times a Day.  -     SYNTHROID 125 MCG tablet; Take 1 tablet by mouth Daily.      In summary I saw  this 64-year-old gentleman for above-mentioned problem.  I reviewed laboratory evaluations of 4/7/2020 and provided him with a hard copy of it.  Overall he is clinically and metabolically stable and however his hemoglobin A1c is 9.8 and in view of the fact that he has not been able to receive some of his medication this number is not surprising.  Rest of his laboratory evaluation is satisfactory.  We will make sure that all his medications are received by him and consumed.  I will see him in 6 months or sooner if needed with laboratory evaluation prior to each office visit.

## 2020-04-30 DIAGNOSIS — E78.2 MIXED HYPERLIPIDEMIA: ICD-10-CM

## 2020-04-30 DIAGNOSIS — I10 BENIGN ESSENTIAL HYPERTENSION: ICD-10-CM

## 2020-04-30 RX ORDER — METOPROLOL TARTRATE 50 MG/1
50 TABLET, FILM COATED ORAL 2 TIMES DAILY
Qty: 60 TABLET | Refills: 0 | Status: SHIPPED | OUTPATIENT
Start: 2020-04-30 | End: 2020-05-18

## 2020-04-30 RX ORDER — APIXABAN 5 MG/1
TABLET, FILM COATED ORAL
Qty: 180 TABLET | Refills: 0 | Status: SHIPPED | OUTPATIENT
Start: 2020-04-30 | End: 2020-07-13

## 2020-04-30 NOTE — TELEPHONE ENCOUNTER
Dr. Palacio are you okay with refilling her Eliquis until she sees you on 6/2020.  She is a former [facility] pt who Dana saw last.  Please advise.    Thanks,  Larisa

## 2020-05-15 DIAGNOSIS — I10 BENIGN ESSENTIAL HYPERTENSION: ICD-10-CM

## 2020-05-15 DIAGNOSIS — E78.2 MIXED HYPERLIPIDEMIA: ICD-10-CM

## 2020-05-18 RX ORDER — METOPROLOL TARTRATE 50 MG/1
50 TABLET, FILM COATED ORAL 2 TIMES DAILY
Qty: 60 TABLET | Refills: 0 | Status: SHIPPED | OUTPATIENT
Start: 2020-05-18 | End: 2020-07-14

## 2020-06-04 ENCOUNTER — OFFICE VISIT (OUTPATIENT)
Dept: CARDIOLOGY | Facility: CLINIC | Age: 65
End: 2020-06-04

## 2020-06-04 VITALS
HEART RATE: 78 BPM | WEIGHT: 258.2 LBS | HEIGHT: 75 IN | SYSTOLIC BLOOD PRESSURE: 166 MMHG | DIASTOLIC BLOOD PRESSURE: 86 MMHG | BODY MASS INDEX: 32.1 KG/M2

## 2020-06-04 DIAGNOSIS — E78.2 MIXED HYPERLIPIDEMIA: ICD-10-CM

## 2020-06-04 DIAGNOSIS — E78.1 FAMILIAL HYPERTRIGLYCERIDEMIA: ICD-10-CM

## 2020-06-04 DIAGNOSIS — I48.92 ATRIAL FLUTTER, UNSPECIFIED TYPE (HCC): Primary | ICD-10-CM

## 2020-06-04 DIAGNOSIS — N18.30 CHRONIC KIDNEY DISEASE, STAGE III (MODERATE) (HCC): ICD-10-CM

## 2020-06-04 DIAGNOSIS — I10 BENIGN ESSENTIAL HYPERTENSION: ICD-10-CM

## 2020-06-04 PROCEDURE — 93000 ELECTROCARDIOGRAM COMPLETE: CPT | Performed by: INTERNAL MEDICINE

## 2020-06-04 PROCEDURE — 99214 OFFICE O/P EST MOD 30 MIN: CPT | Performed by: INTERNAL MEDICINE

## 2020-07-13 DIAGNOSIS — I10 BENIGN ESSENTIAL HYPERTENSION: ICD-10-CM

## 2020-07-13 DIAGNOSIS — E78.2 MIXED HYPERLIPIDEMIA: ICD-10-CM

## 2020-07-13 RX ORDER — APIXABAN 5 MG/1
TABLET, FILM COATED ORAL
Qty: 180 TABLET | Refills: 0 | Status: SHIPPED | OUTPATIENT
Start: 2020-07-13 | End: 2020-09-25

## 2020-07-14 RX ORDER — METOPROLOL TARTRATE 50 MG/1
50 TABLET, FILM COATED ORAL 2 TIMES DAILY
Qty: 180 TABLET | Refills: 0 | Status: SHIPPED | OUTPATIENT
Start: 2020-07-14 | End: 2021-01-25

## 2020-09-14 DIAGNOSIS — I10 BENIGN ESSENTIAL HYPERTENSION: ICD-10-CM

## 2020-09-14 DIAGNOSIS — E78.2 MIXED HYPERLIPIDEMIA: ICD-10-CM

## 2020-09-16 RX ORDER — FENOFIBRATE 145 MG/1
145 TABLET, COATED ORAL DAILY
Qty: 30 TABLET | Refills: 0 | Status: SHIPPED | OUTPATIENT
Start: 2020-09-16 | End: 2021-01-18

## 2020-09-25 DIAGNOSIS — IMO0002 UNCONTROLLED TYPE 2 DIABETES MELLITUS WITH COMPLICATION, WITHOUT LONG-TERM CURRENT USE OF INSULIN: ICD-10-CM

## 2020-09-25 RX ORDER — APIXABAN 5 MG/1
TABLET, FILM COATED ORAL
Qty: 180 TABLET | Refills: 2 | Status: SHIPPED | OUTPATIENT
Start: 2020-09-25 | End: 2021-06-28

## 2020-09-25 RX ORDER — BLOOD SUGAR DIAGNOSTIC
STRIP MISCELLANEOUS
Qty: 500 EACH | Refills: 1 | Status: SHIPPED | OUTPATIENT
Start: 2020-09-25 | End: 2021-10-10 | Stop reason: SDUPTHER

## 2020-09-25 RX ORDER — INSULIN GLARGINE 100 [IU]/ML
90 INJECTION, SOLUTION SUBCUTANEOUS DAILY
Qty: 15 PEN | Refills: 0 | Status: SHIPPED | OUTPATIENT
Start: 2020-09-25 | End: 2020-11-05

## 2020-10-07 ENCOUNTER — RESULTS ENCOUNTER (OUTPATIENT)
Dept: ENDOCRINOLOGY | Age: 65
End: 2020-10-07

## 2020-10-07 DIAGNOSIS — E79.0 HYPERURICEMIA: ICD-10-CM

## 2020-10-07 DIAGNOSIS — E03.8 HYPOTHYROIDISM DUE TO HASHIMOTO'S THYROIDITIS: ICD-10-CM

## 2020-10-07 DIAGNOSIS — R79.89 LOW TESTOSTERONE: ICD-10-CM

## 2020-10-07 DIAGNOSIS — IMO0002 DIABETES MELLITUS TYPE 2, UNCONTROLLED, WITH COMPLICATIONS: ICD-10-CM

## 2020-10-07 DIAGNOSIS — E06.3 HYPOTHYROIDISM DUE TO HASHIMOTO'S THYROIDITIS: ICD-10-CM

## 2020-10-07 DIAGNOSIS — E78.5 DYSLIPIDEMIA: ICD-10-CM

## 2020-10-30 ENCOUNTER — OFFICE VISIT (OUTPATIENT)
Dept: ENDOCRINOLOGY | Age: 65
End: 2020-10-30

## 2020-10-30 VITALS
RESPIRATION RATE: 16 BRPM | DIASTOLIC BLOOD PRESSURE: 74 MMHG | WEIGHT: 268 LBS | HEIGHT: 75 IN | BODY MASS INDEX: 33.32 KG/M2 | SYSTOLIC BLOOD PRESSURE: 128 MMHG

## 2020-10-30 DIAGNOSIS — E11.9 TYPE 2 DIABETES MELLITUS WITHOUT COMPLICATION, WITH LONG-TERM CURRENT USE OF INSULIN (HCC): ICD-10-CM

## 2020-10-30 DIAGNOSIS — Z79.4 TYPE 2 DIABETES MELLITUS WITHOUT COMPLICATION, WITH LONG-TERM CURRENT USE OF INSULIN (HCC): ICD-10-CM

## 2020-10-30 DIAGNOSIS — R79.89 LOW TESTOSTERONE IN MALE: Primary | ICD-10-CM

## 2020-10-30 DIAGNOSIS — E78.2 MIXED HYPERLIPIDEMIA: ICD-10-CM

## 2020-10-30 PROCEDURE — 99214 OFFICE O/P EST MOD 30 MIN: CPT | Performed by: NURSE PRACTITIONER

## 2020-10-30 NOTE — PROGRESS NOTES
64 y.o.male    Patient Care Team:  Provider, No Known as PCP - General    Chief Complaint: dm2 f/u , hyperlipidemia, low testosterone     Subjective   Updates since his last visit:   Patient reports his bydureon was stopped related to insurance coverage change.  After the Covid pandemic he was no longer able to keep his job and ended up retiring and was placed on Medicaid insurance.    He reports he is currently off testosterone. Insurance wouldn't cover any of the gels like he was on previously. He does notice he is more tired than usual and is wanting to get back replacement.  He is agreeable to shots.  He is agreeable to yearly prostate exams with his PCP or urologist.  He has not had a sleep study in the past.    Overall he is feeling very well and is happy with his stable blood sugars.  He is going to the gym now and doing his best to eat a healthy diet.    HPI    Kem Alvarado 64 y.o. presents with Type  2 dm as a F/u patient.     Type 2 dm - Diagnosed about 12 years ago.   Today in clinic pt reports being on basaglar 80-90units QD, segluromet 7.5/1000 BID  FBG - 110-120  Pre meals - 110-130, never close to 200  Checks BG - 3x/day  Dm retinopathy - denies ,Last eye exam - over a year ago, will try to arrange  Dm nephropathy - denies  Dm neuropathy - denies  CAD - denies  CVA - denies  Episodes of hypoglycemia - hardly ever, only 2 his entire life  Pt is physically active. weight is up since last visit but the patient has resumed going to the gym since the pandemic and his weight is improving overall.   Pt tries to follow DM diet for most part.     Patient reports he sees cardiologist 2x/ year for his blood thinner and hypertension.    Patient is currently on atorvastatin, fenofibrate, and vascepa for his chronic, mixed hyperlipidemia.    Social History     Tobacco Use   Smoking Status Former Smoker   Smokeless Tobacco Never Used     Social History     Substance and Sexual Activity   Alcohol Use Yes   •  Alcohol/week: 1.0 standard drinks   • Types: 1 Cans of beer per week    Comment: occassional         Interval History      The following portions of the patient's history were reviewed and updated as appropriate: allergies, current medications, past family history, past medical history, past social history, past surgical history and problem list.    Past Medical History:   Diagnosis Date   • ADHD (attention deficit hyperactivity disorder)    • Atrial flutter (CMS/HCC)    • Benign essential hypertension    • Benign polyp of large intestine    • Diabetes mellitus (CMS/HCC)    • Hyperlipidemia    • Pancreatitis    • Type 2 diabetes mellitus (CMS/HCC)    • Vitamin D deficiency      Family History   Problem Relation Age of Onset   • Cancer Mother    • Diabetes Mother    • Heart attack Father    • Diabetes Father    • Heart attack Sister    • Diabetes Sister    • Nephrolithiasis Brother    • Urolithiasis Other    • No Known Problems Maternal Grandmother    • No Known Problems Maternal Grandfather    • No Known Problems Paternal Grandmother    • No Known Problems Paternal Grandfather      Social History     Socioeconomic History   • Marital status:      Spouse name: Not on file   • Number of children: Not on file   • Years of education: Not on file   • Highest education level: Not on file   Tobacco Use   • Smoking status: Former Smoker   • Smokeless tobacco: Never Used   Substance and Sexual Activity   • Alcohol use: Yes     Alcohol/week: 1.0 standard drinks     Types: 1 Cans of beer per week     Comment: occassional   • Drug use: No   • Sexual activity: Yes     Partners: Female     Birth control/protection: None     Allergies   Allergen Reactions   • Synjardy [Empagliflozin-Metformin Hcl] Other (See Comments)     CKD       Current Outpatient Medications:   •  Accu-Chek Melida Plus test strip, CHECK BLOOD SUGAR 5 TIMES DAILY, Disp: 500 each, Rfl: 1  •  allopurinol (ZYLOPRIM) 100 MG tablet, Take 1 tablet by mouth  Daily., Disp: 90 tablet, Rfl: 3  •  amLODIPine (NORVASC) 5 MG tablet, 1 tablet by mouth twice daily, Disp: 180 tablet, Rfl: 3  •  atorvastatin (Lipitor) 80 MG tablet, Take 1 tablet by mouth Daily., Disp: 90 tablet, Rfl: 3  •  Eliquis 5 MG tablet tablet, TAKE ONE TABLET BY MOUTH TWICE A DAY, Disp: 180 tablet, Rfl: 2  •  fenofibrate (TRICOR) 145 MG tablet, TAKE 1 TABLET BY MOUTH DAILY., Disp: 30 tablet, Rfl: 0  •  Insulin Glargine (BASAGLAR KWIKPEN) 100 UNIT/ML injection pen, INJECT 90 UNITS UNDER THE SKIN INTO THE APPROPRIATE AREA AS DIRECTED DAILY., Disp: 15 pen, Rfl: 0  •  metoprolol tartrate (LOPRESSOR) 50 MG tablet, TAKE 1 TABLET BY MOUTH 2 (TWO) TIMES A DAY., Disp: 180 tablet, Rfl: 0  •  SEGLUROMET 7.5-1000 MG tablet, Take 1 tablet by mouth 2 (Two) Times a Day., Disp: 60 tablet, Rfl: 11  •  SYNTHROID 125 MCG tablet, Take 1 tablet by mouth Daily., Disp: 90 tablet, Rfl: 3  •  VASCEPA 1 g capsule capsule, Take 2 g by mouth 2 (Two) Times a Day With Meals., Disp: 360 capsule, Rfl: 3  •  vitamin D (ERGOCALCIFEROL) 1.25 MG (70843 UT) capsule capsule, Take 1 capsule by mouth 2 (Two) Times a Week., Disp: 36 capsule, Rfl: 3        Review of Systems   Constitutional: Positive for fatigue. Negative for activity change, appetite change and unexpected weight change.   Eyes: Negative for photophobia and visual disturbance.   Respiratory: Negative for cough, choking, chest tightness and shortness of breath.    Cardiovascular: Negative for chest pain, palpitations and leg swelling.   Gastrointestinal: Negative for constipation, diarrhea, nausea and vomiting.   Endocrine: Negative for polydipsia, polyphagia and polyuria.   Genitourinary: Negative for decreased urine volume, difficulty urinating, dysuria and urgency.   Musculoskeletal: Negative for arthralgias and myalgias.   Neurological: Negative for dizziness, tremors, weakness, light-headedness and headaches.   Psychiatric/Behavioral: Negative for behavioral problems,  "decreased concentration and sleep disturbance. The patient is not nervous/anxious.        Objective       Vitals:    10/30/20 0944   BP: 128/74   Resp: 16   Weight: 122 kg (268 lb)   Height: 190.5 cm (75\")     Body mass index is 33.5 kg/m².      Physical Exam  Constitutional:       General: He is not in acute distress.  HENT:      Head: Normocephalic and atraumatic.   Neck:      Musculoskeletal: Normal range of motion and neck supple.   Cardiovascular:      Rate and Rhythm: Normal rate and regular rhythm.   Pulmonary:      Effort: Pulmonary effort is normal. No respiratory distress.   Musculoskeletal: Normal range of motion.         General: No swelling.   Skin:     General: Skin is warm and dry.   Neurological:      General: No focal deficit present.      Mental Status: He is alert and oriented to person, place, and time.   Psychiatric:         Mood and Affect: Mood normal.         Behavior: Behavior normal.       Results Review:      Results Encounter on 01/24/2020   Component Date Value Ref Range Status   • TSH 04/07/2020 2.020  0.270 - 4.200 uIU/mL Final   • T4, Total 04/07/2020 6.14  4.50 - 11.70 mcg/dL Final    Results may be falsely increased if patient taking Biotin.   • Free T4 04/07/2020 1.25  0.93 - 1.70 ng/dL Final    Results may be falsely increased if patient taking Biotin.   • T3, Free 04/07/2020 2.6  2.0 - 4.4 pg/mL Final   • Uric Acid 04/07/2020 4.1  3.4 - 7.0 mg/dL Final   • 25 Hydroxy, Vitamin D 04/07/2020 85.1  30.0 - 100.0 ng/ml Final    Comment: Results may be falsely increased if patient taking Biotin.  Reference Range for Total Vitamin D 25(OH)  Deficiency <20.0 ng/mL  Insufficiency 21-29 ng/mL  Sufficiency  ng/mL  Toxicity >100 ng/ml     • Glucose 04/07/2020 214* 65 - 99 mg/dL Final   • BUN 04/07/2020 17  8 - 23 mg/dL Final   • Creatinine 04/07/2020 1.16  0.76 - 1.27 mg/dL Final   • eGFR Non African Am 04/07/2020 64  >60 mL/min/1.73 Final   • eGFR African Am 04/07/2020 77  >60 " mL/min/1.73 Final   • BUN/Creatinine Ratio 04/07/2020 14.7  7.0 - 25.0 Final   • Sodium 04/07/2020 139  136 - 145 mmol/L Final   • Potassium 04/07/2020 4.8  3.5 - 5.2 mmol/L Final   • Chloride 04/07/2020 100  98 - 107 mmol/L Final   • Total CO2 04/07/2020 27.5  22.0 - 29.0 mmol/L Final   • Calcium 04/07/2020 9.7  8.6 - 10.5 mg/dL Final   • Total Protein 04/07/2020 6.6  6.0 - 8.5 g/dL Final   • Albumin 04/07/2020 3.90  3.50 - 5.20 g/dL Final   • Globulin 04/07/2020 2.7  gm/dL Final   • A/G Ratio 04/07/2020 1.4  g/dL Final   • Total Bilirubin 04/07/2020 0.3  0.2 - 1.2 mg/dL Final   • Alkaline Phosphatase 04/07/2020 54  39 - 117 U/L Final   • AST (SGOT) 04/07/2020 27  1 - 40 U/L Final   • ALT (SGPT) 04/07/2020 30  1 - 41 U/L Final   • C-Peptide 04/07/2020 1.7  1.1 - 4.4 ng/mL Final    C-Peptide reference interval is for fasting patients.   • Hemoglobin A1C 04/07/2020 9.80* 4.80 - 5.60 % Final    Comment: Hemoglobin A1C Ranges:  Increased Risk for Diabetes  5.7% to 6.4%  Diabetes                     >= 6.5%  Diabetic Goal                < 7.0%     • Microalbumin, Urine 04/07/2020 27.2  Not Estab. ug/mL Final   • LDL-P 04/07/2020 1,316* <1,000 nmol/L Final    Comment:                           Low                   < 1000                            Moderate         1000 - 1299                            Borderline-High  1300 - 1599                            High             1600 - 2000                            Very High             > 2000     • LDL-C 04/07/2020 94  0 - 99 mg/dL Final    Comment:                           Optimal               <  100                            Above optimal     100 -  129                            Borderline        130 -  159                            High              160 -  189                            Very high             >  189  LDL-C is inaccurate if patient is non-fasting.     • HDL-C 04/07/2020 54  >39 mg/dL Final   • Triglycerides 04/07/2020 235* 0 - 149 mg/dL Final   •  Total Cholesterol 04/07/2020 195  100 - 199 mg/dL Final   • HDL-P (Total) 04/07/2020 39.3  >=30.5 umol/L Final   • Small LDL-P 04/07/2020 730* <=527 nmol/L Final   • LDL Size 04/07/2020 20.6  >20.5 nm Final    Comment:  ----------------------------------------------------------                   ** INTERPRETATIVE INFORMATION**                   PARTICLE CONCENTRATION AND SIZE                      <--Lower CVD Risk   Higher CVD Risk-->    LDL AND HDL PARTICLES   Percentile in Reference Population    HDL-P (total)        High     75th    50th    25th   Low                         >34.9    34.9    30.5    26.7   <26.7    Small LDL-P          Low      25th    50th    75th   High                         <117     117     527     839    >839    LDL Size   <-Large (Pattern A)->    <-Small (Pattern B)->                      23.0    20.6           20.5      19.0   ----------------------------------------------------------  Small LDL-P and LDL Size are associated with CVD risk, but not after  LDL-P is taken into account.     • Testosterone, Total 04/07/2020 315  264 - 916 ng/dL Final    Comment: Adult male reference interval is based on a population of  healthy nonobese males (BMI <30) between 19 and 39 years old.  Shona et.al. JCEM 2017,102;2180-5385. PMID: 95629220.     • Testosterone, Free 04/07/2020 10.4  6.6 - 18.1 pg/mL Final   • Sex Hormone Binding Globulin 04/07/2020 30.7  19.3 - 76.4 nmol/L Final   • PSA 04/07/2020 0.210  0.000 - 4.000 ng/mL Final    Results may be falsely decreased if patient taking Biotin.   • Hemoglobin 04/07/2020 13.9  13.0 - 17.7 g/dL Final   • Hematocrit 04/07/2020 42.5  37.5 - 51.0 % Final     Lab Results   Component Value Date    HGBA1C 9.80 (H) 04/07/2020    HGBA1C 9.80 (H) 09/17/2019    HGBA1C 11.40 (H) 06/07/2019     Lab Results   Component Value Date    MICROALBUR 27.2 04/07/2020    CREATININE 1.16 04/07/2020     Imaging Results (Most Recent)     None                Assessment and  "Plan:    Diagnoses and all orders for this visit:    1. Low testosterone in male (Primary)  -     Testosterone, Free, Total    2. Mixed hyperlipidemia  -     Lipid Panel    3. Type 2 diabetes mellitus without complication, with long-term current use of insulin (CMS/[facility])  -     Hemoglobin A1c    We will repeat his labs today.  After these labs are resulted will discuss with Dr. Alvarenga if it is okay to resume testosterone replacement.  Patient will need to get yearly prostate exams and may need to have a sleep study prior to initiating therapy to ensure we minimize the possible risk associated with testosterone therapy replacement.    We will continue with the atorvastatin, Vascepa and fenofibrate and repeat his lipid panel today.  He is currently tolerating these medications well and is not having any signs of myalgias.  He is exercising regularly and consuming a healthy diet as best as he can.    He is currently off a GLP-1 agonist related to insurance coverage.  If his A1c is above target we may consider resuming medication better covered by his insurance.  He is a low risk for hypoglycemia.  He is doing well with compliance on monitoring his blood sugars and taking his medications.  He is doing well with a diabetic diet and physical activity.    He will follow up with Dr. Alvarenga in 4 months and would like to do his labs on the same day as his visit.  He lives an hour away from our office    JIM Courtney  10/30/20    EMR Dragon / transcription disclaimer:     \"Dictated utilizing Dragon dictation\".  "

## 2020-11-01 LAB
CHOLEST SERPL-MCNC: 136 MG/DL (ref 0–200)
HBA1C MFR BLD: 7.7 % (ref 4.8–5.6)
HDLC SERPL-MCNC: 45 MG/DL (ref 40–60)
INTERPRETATION: NORMAL
LDLC SERPL CALC-MCNC: 59 MG/DL (ref 0–100)
Lab: NORMAL
TESTOST FREE SERPL-MCNC: 8.6 PG/ML (ref 6.6–18.1)
TESTOST SERPL-MCNC: 264 NG/DL (ref 264–916)
TRIGL SERPL-MCNC: 192 MG/DL (ref 0–150)
VLDLC SERPL CALC-MCNC: 32 MG/DL (ref 5–40)

## 2020-11-05 RX ORDER — INSULIN GLARGINE 100 [IU]/ML
90 INJECTION, SOLUTION SUBCUTANEOUS DAILY
Qty: 10 PEN | Refills: 3 | Status: SHIPPED | OUTPATIENT
Start: 2020-11-05 | End: 2021-01-11

## 2020-12-06 DIAGNOSIS — E78.2 MIXED HYPERLIPIDEMIA: ICD-10-CM

## 2020-12-06 DIAGNOSIS — I10 BENIGN ESSENTIAL HYPERTENSION: ICD-10-CM

## 2020-12-07 RX ORDER — METOPROLOL TARTRATE 50 MG/1
50 TABLET, FILM COATED ORAL 2 TIMES DAILY
Qty: 180 TABLET | Refills: 1 | OUTPATIENT
Start: 2020-12-07

## 2020-12-26 DIAGNOSIS — E79.0 HYPERURICEMIA: ICD-10-CM

## 2020-12-26 DIAGNOSIS — I10 BENIGN ESSENTIAL HYPERTENSION: ICD-10-CM

## 2020-12-26 DIAGNOSIS — E78.2 MIXED HYPERLIPIDEMIA: ICD-10-CM

## 2020-12-28 RX ORDER — ALLOPURINOL 100 MG/1
100 TABLET ORAL DAILY
Qty: 90 TABLET | Refills: 3 | OUTPATIENT
Start: 2020-12-28

## 2020-12-28 RX ORDER — METOPROLOL TARTRATE 50 MG/1
50 TABLET, FILM COATED ORAL 2 TIMES DAILY
Qty: 60 TABLET | OUTPATIENT
Start: 2020-12-28

## 2020-12-30 DIAGNOSIS — I10 BENIGN ESSENTIAL HYPERTENSION: ICD-10-CM

## 2020-12-30 DIAGNOSIS — E78.2 MIXED HYPERLIPIDEMIA: ICD-10-CM

## 2020-12-30 RX ORDER — METOPROLOL TARTRATE 50 MG/1
50 TABLET, FILM COATED ORAL 2 TIMES DAILY
Qty: 60 TABLET | OUTPATIENT
Start: 2020-12-30

## 2021-01-11 RX ORDER — INSULIN GLARGINE 100 [IU]/ML
90 INJECTION, SOLUTION SUBCUTANEOUS DAILY
Qty: 15 ML | Refills: 0 | Status: SHIPPED | OUTPATIENT
Start: 2021-01-11 | End: 2021-02-01

## 2021-01-17 DIAGNOSIS — E78.2 MIXED HYPERLIPIDEMIA: ICD-10-CM

## 2021-01-17 DIAGNOSIS — I10 BENIGN ESSENTIAL HYPERTENSION: ICD-10-CM

## 2021-01-18 RX ORDER — FENOFIBRATE 145 MG/1
145 TABLET, COATED ORAL DAILY
Qty: 90 TABLET | Refills: 0 | Status: SHIPPED | OUTPATIENT
Start: 2021-01-18 | End: 2021-07-16

## 2021-01-25 DIAGNOSIS — I10 BENIGN ESSENTIAL HYPERTENSION: ICD-10-CM

## 2021-01-25 DIAGNOSIS — E78.2 MIXED HYPERLIPIDEMIA: ICD-10-CM

## 2021-01-25 RX ORDER — METOPROLOL TARTRATE 50 MG/1
TABLET, FILM COATED ORAL
Qty: 60 TABLET | Refills: 0 | Status: SHIPPED | OUTPATIENT
Start: 2021-01-25

## 2021-02-01 RX ORDER — INSULIN GLARGINE 100 [IU]/ML
90 INJECTION, SOLUTION SUBCUTANEOUS DAILY
Qty: 83 ML | Refills: 1 | Status: SHIPPED | OUTPATIENT
Start: 2021-02-01 | End: 2021-07-16

## 2021-02-25 DIAGNOSIS — E78.2 MIXED HYPERLIPIDEMIA: ICD-10-CM

## 2021-02-25 DIAGNOSIS — I10 BENIGN ESSENTIAL HYPERTENSION: ICD-10-CM

## 2021-02-25 RX ORDER — METOPROLOL TARTRATE 50 MG/1
TABLET, FILM COATED ORAL
Qty: 60 TABLET | Refills: 0 | OUTPATIENT
Start: 2021-02-25

## 2021-02-26 ENCOUNTER — LAB (OUTPATIENT)
Dept: ENDOCRINOLOGY | Age: 66
End: 2021-02-26

## 2021-02-26 DIAGNOSIS — E55.9 VITAMIN D DEFICIENCY: ICD-10-CM

## 2021-02-26 DIAGNOSIS — Z79.4 TYPE 2 DIABETES MELLITUS WITHOUT COMPLICATION, WITH LONG-TERM CURRENT USE OF INSULIN (HCC): ICD-10-CM

## 2021-02-26 DIAGNOSIS — E03.9 ACQUIRED HYPOTHYROIDISM: ICD-10-CM

## 2021-02-26 DIAGNOSIS — E78.5 DYSLIPIDEMIA: ICD-10-CM

## 2021-02-26 DIAGNOSIS — R79.89 LOW TESTOSTERONE IN MALE: Primary | ICD-10-CM

## 2021-02-26 DIAGNOSIS — R79.89 LOW TESTOSTERONE IN MALE: ICD-10-CM

## 2021-02-26 DIAGNOSIS — E11.9 TYPE 2 DIABETES MELLITUS WITHOUT COMPLICATION, WITH LONG-TERM CURRENT USE OF INSULIN (HCC): ICD-10-CM

## 2021-02-26 RX ORDER — METOPROLOL TARTRATE 50 MG/1
TABLET, FILM COATED ORAL
Qty: 60 TABLET | Refills: 0 | OUTPATIENT
Start: 2021-02-26

## 2021-02-27 DIAGNOSIS — I10 BENIGN ESSENTIAL HYPERTENSION: ICD-10-CM

## 2021-02-27 DIAGNOSIS — E78.2 MIXED HYPERLIPIDEMIA: ICD-10-CM

## 2021-03-01 LAB
25(OH)D3+25(OH)D2 SERPL-MCNC: 65.3 NG/ML (ref 30–100)
ALBUMIN SERPL-MCNC: 4.5 G/DL (ref 3.5–5.2)
ALBUMIN/CREAT UR: 36 MG/G CREAT (ref 0–29)
ALBUMIN/GLOB SERPL: 1.7 G/DL
ALP SERPL-CCNC: 54 U/L (ref 39–117)
ALT SERPL-CCNC: 30 U/L (ref 1–41)
AST SERPL-CCNC: 38 U/L (ref 1–40)
BILIRUB SERPL-MCNC: 0.4 MG/DL (ref 0–1.2)
BUN SERPL-MCNC: 24 MG/DL (ref 8–23)
BUN/CREAT SERPL: 15.5 (ref 7–25)
CALCIUM SERPL-MCNC: 10 MG/DL (ref 8.6–10.5)
CHLORIDE SERPL-SCNC: 104 MMOL/L (ref 98–107)
CHOLEST SERPL-MCNC: 141 MG/DL (ref 0–200)
CO2 SERPL-SCNC: 24.3 MMOL/L (ref 22–29)
CREAT SERPL-MCNC: 1.55 MG/DL (ref 0.76–1.27)
CREAT UR-MCNC: 63.1 MG/DL
GLOBULIN SER CALC-MCNC: 2.7 GM/DL
GLUCOSE SERPL-MCNC: 156 MG/DL (ref 65–99)
HBA1C MFR BLD: 7.7 % (ref 4.8–5.6)
HCT VFR BLD AUTO: 46.1 % (ref 37.5–51)
HDLC SERPL-MCNC: 42 MG/DL (ref 40–60)
HGB BLD-MCNC: 15.4 G/DL (ref 13–17.7)
INTERPRETATION: NORMAL
LDLC SERPL CALC-MCNC: 62 MG/DL (ref 0–100)
Lab: NORMAL
MICROALBUMIN UR-MCNC: 22.9 UG/ML
POTASSIUM SERPL-SCNC: 4.6 MMOL/L (ref 3.5–5.2)
PROT SERPL-MCNC: 7.2 G/DL (ref 6–8.5)
SHBG SERPL-SCNC: 27.4 NMOL/L (ref 19.3–76.4)
SODIUM SERPL-SCNC: 139 MMOL/L (ref 136–145)
T4 FREE SERPL-MCNC: 1.25 NG/DL (ref 0.93–1.7)
TESTOST FREE SERPL-MCNC: 7.9 PG/ML (ref 6.6–18.1)
TESTOST SERPL-MCNC: 212 NG/DL (ref 264–916)
TRIGL SERPL-MCNC: 225 MG/DL (ref 0–150)
TSH SERPL DL<=0.005 MIU/L-ACNC: 2.74 UIU/ML (ref 0.27–4.2)
VLDLC SERPL CALC-MCNC: 37 MG/DL (ref 5–40)

## 2021-03-01 RX ORDER — METOPROLOL TARTRATE 50 MG/1
TABLET, FILM COATED ORAL
Qty: 60 TABLET | Refills: 0 | OUTPATIENT
Start: 2021-03-01

## 2021-03-12 ENCOUNTER — OFFICE VISIT (OUTPATIENT)
Dept: ENDOCRINOLOGY | Age: 66
End: 2021-03-12

## 2021-03-12 VITALS
WEIGHT: 271.8 LBS | OXYGEN SATURATION: 97 % | SYSTOLIC BLOOD PRESSURE: 130 MMHG | HEIGHT: 75 IN | DIASTOLIC BLOOD PRESSURE: 80 MMHG | HEART RATE: 68 BPM | BODY MASS INDEX: 33.8 KG/M2

## 2021-03-12 DIAGNOSIS — Z79.4 LONG-TERM INSULIN USE (HCC): Primary | ICD-10-CM

## 2021-03-12 DIAGNOSIS — Z79.4 TYPE 2 DIABETES MELLITUS WITH HYPERGLYCEMIA, WITH LONG-TERM CURRENT USE OF INSULIN (HCC): ICD-10-CM

## 2021-03-12 DIAGNOSIS — N18.31 STAGE 3A CHRONIC KIDNEY DISEASE (HCC): ICD-10-CM

## 2021-03-12 DIAGNOSIS — E03.9 ACQUIRED HYPOTHYROIDISM: ICD-10-CM

## 2021-03-12 DIAGNOSIS — E78.2 HYPERLIPEMIA, MIXED: ICD-10-CM

## 2021-03-12 DIAGNOSIS — E11.65 TYPE 2 DIABETES MELLITUS WITH HYPERGLYCEMIA, WITH LONG-TERM CURRENT USE OF INSULIN (HCC): ICD-10-CM

## 2021-03-12 PROCEDURE — 99214 OFFICE O/P EST MOD 30 MIN: CPT | Performed by: INTERNAL MEDICINE

## 2021-03-12 RX ORDER — DAPAGLIFLOZIN 10 MG/1
10 TABLET, FILM COATED ORAL DAILY
Qty: 30 TABLET | Refills: 11 | Status: SHIPPED | OUTPATIENT
Start: 2021-03-12 | End: 2021-11-18 | Stop reason: SDUPTHER

## 2021-03-12 RX ORDER — INSULIN GLARGINE 100 [IU]/ML
90 INJECTION, SOLUTION SUBCUTANEOUS DAILY
COMMUNITY
Start: 2020-11-05 | End: 2021-03-12

## 2021-03-12 NOTE — PROGRESS NOTES
"Chief Complaint  Chief Complaint   Patient presents with   • Diabetes   • Hypogonadism   FOLLOW UP/ TYPE 2 DM / HYPOGONADISM    Subjective          History of Present Illness    Kem Alvarado 65 y.o. presents with Type 2 dm as a F/u patient.     Type 2 dm - Diagnosed about 12  years ago.   Today in clinic pt reports being on basaglar 90 units daily at bed time, segluromet 7.5/1000 mg po bid.   FBG - 100 - 130  Pre meals - 120 - 140  Checks BG - 3 times a day  Sensor - doesn't have  Dm retinopathy - no,Last eye exam - 1 year ago.  Dm nephropathy - no  Dm neuropathy - no,Dm neuropathy meds - no  CAD - none  CVA -none  Episodes of hypoglycemia - didn't in the last few weeks.   Pt is physically active. weight has been stable.   Pt tries to follow DM diet for most part.   On Ace inb.    Hyperlipidemia - lipitor 80 mg po daily. Fenofibrate 145 mg po daily, vascepa     Hypothyroidism - on synthroid 125 mcg oral daily.     Reviewed primary care physician's/consulting physician documentation and lab results         I have reviewed the patient's allergies, medicines, past medical hx, family hx and social hx in detail.    Objective   Vital Signs:   /80   Pulse 68   Ht 190.5 cm (75\")   Wt 123 kg (271 lb 12.8 oz)   SpO2 97%   BMI 33.97 kg/m²   Physical Exam   General appearance - no distress  Eyes- anicteric sclera  Ear nose and throat-external ears and nose normal.    Respiratory-normal chest on inspection.  No respiratory distress noted.  Musculoskeletal-no edema.    Skin-no rashes.  Neuro-alert and oriented x3      Result Review :{ Labs  Result Review  Imaging  Med Tab  Media :23}   The following data was reviewed by: Cong Alvarenga MD on 03/12/2021:  Lab on 02/26/2021   Component Date Value Ref Range Status   • Hemoglobin 02/26/2021 15.4  13.0 - 17.7 g/dL Final   • Hematocrit 02/26/2021 46.1  37.5 - 51.0 % Final   • Total Cholesterol 02/26/2021 141  0 - 200 mg/dL Final    Comment: Cholesterol Reference " Ranges  (U.S. Department of Health and Human Services ATP III  Classifications)  Desirable          <200 mg/dL  Borderline High    200-239 mg/dL  High Risk          >240 mg/dL  Triglyceride Reference Ranges  (U.S. Department of Health and Human Services ATP III  Classifications)  Normal           <150 mg/dL  Borderline High  150-199 mg/dL  High             200-499 mg/dL  Very High        >500 mg/dL  HDL Reference Ranges  (U.S. Department of Health and Human Services ATP III  Classifcations)  Low     <40 mg/dl (major risk factor for CHD)  High    >60 mg/dl ('negative' risk factor for CHD)  LDL Reference Ranges  (U.S. Department of Health and Human Services ATP III  Classifcations)  Optimal          <100 mg/dL  Near Optimal     100-129 mg/dL  Borderline High  130-159 mg/dL  High             160-189 mg/dL  Very High        >189 mg/dL     • Triglycerides 02/26/2021 225* 0 - 150 mg/dL Final   • HDL Cholesterol 02/26/2021 42  40 - 60 mg/dL Final   • VLDL Cholesterol Lito 02/26/2021 37  5 - 40 mg/dL Final   • LDL Chol Calc ([facility]) 02/26/2021 62  0 - 100 mg/dL Final   • 25 Hydroxy, Vitamin D 02/26/2021 65.3  30.0 - 100.0 ng/ml Final    Comment: Results may be falsely increased if patient taking Biotin.  Reference Range for Total Vitamin D 25(OH)  Deficiency <20.0 ng/mL  Insufficiency 21-29 ng/mL  Sufficiency  ng/mL  Toxicity >100 ng/ml     • Testosterone, Total 02/26/2021 212* 264 - 916 ng/dL Final    Comment: Adult male reference interval is based on a population of  healthy nonobese males (BMI <30) between 19 and 39 years old.  Shona et.al. JCEM 2017,102;1883-2139. PMID: [id number].     • Testosterone, Free 02/26/2021 7.9  6.6 - 18.1 pg/mL Final   • Sex Hormone Binding Globulin 02/26/2021 27.4  19.3 - 76.4 nmol/L Final   • Glucose 02/26/2021 156* 65 - 99 mg/dL Final   • BUN 02/26/2021 24* 8 - 23 mg/dL Final   • Creatinine 02/26/2021 1.55* 0.76 - 1.27 mg/dL Final   • eGFR Non African Am 02/26/2021 45* >60 mL/min/1.73  Final    Comment: GFR Normal >60  Chronic Kidney Disease <60  Kidney Failure <15     • eGFR  Am 02/26/2021 55* >60 mL/min/1.73 Final   • BUN/Creatinine Ratio 02/26/2021 15.5  7.0 - 25.0 Final   • Sodium 02/26/2021 139  136 - 145 mmol/L Final   • Potassium 02/26/2021 4.6  3.5 - 5.2 mmol/L Final   • Chloride 02/26/2021 104  98 - 107 mmol/L Final   • Total CO2 02/26/2021 24.3  22.0 - 29.0 mmol/L Final   • Calcium 02/26/2021 10.0  8.6 - 10.5 mg/dL Final   • Total Protein 02/26/2021 7.2  6.0 - 8.5 g/dL Final   • Albumin 02/26/2021 4.50  3.50 - 5.20 g/dL Final   • Globulin 02/26/2021 2.7  gm/dL Final   • A/G Ratio 02/26/2021 1.7  g/dL Final   • Total Bilirubin 02/26/2021 0.4  0.0 - 1.2 mg/dL Final   • Alkaline Phosphatase 02/26/2021 54  39 - 117 U/L Final   • AST (SGOT) 02/26/2021 38  1 - 40 U/L Final   • ALT (SGPT) 02/26/2021 30  1 - 41 U/L Final   • Hemoglobin A1C 02/26/2021 7.70* 4.80 - 5.60 % Final    Comment: Hemoglobin A1C Ranges:  Increased Risk for Diabetes  5.7% to 6.4%  Diabetes                     >= 6.5%  Diabetic Goal                < 7.0%     • TSH 02/26/2021 2.740  0.270 - 4.200 uIU/mL Final   • Free T4 02/26/2021 1.25  0.93 - 1.70 ng/dL Final    Results may be falsely increased if patient taking Biotin.   • Creatinine, Urine 02/26/2021 63.1  Not Estab. mg/dL Final   • Microalbumin, Urine 02/26/2021 22.9  Not Estab. ug/mL Final   • Microalbumin/Creatinine Ratio 02/26/2021 36* 0 - 29 mg/g creat Final    Comment:                        Normal:                0 -  29                         Moderately increased: 30 - 300                         Severely increased:       >300     • Interpretation 02/26/2021 Note   Final    Supplemental report is available.   • PDF Image 02/26/2021 Not applicable   Final     Data reviewed:  documentation       Results Review:    I reviewed the patient's new clinical results.     Assessment and Plan    Problem List Items Addressed This Visit        Other    Chronic  kidney disease, stage III (moderate) (CMS/[facility])    Relevant Orders    Basic Metabolic Panel    Hemoglobin A1c    Lipid Panel    TSH    Microalbumin / Creatinine Urine Ratio - Urine, Clean Catch    T4, Free    Vitamin D 25 Hydroxy    Basic Metabolic Panel    Hypothyroidism    Relevant Orders    Basic Metabolic Panel    Hemoglobin A1c    Lipid Panel    TSH    Microalbumin / Creatinine Urine Ratio - Urine, Clean Catch    T4, Free    Vitamin D 25 Hydroxy    Basic Metabolic Panel      Other Visit Diagnoses     Long-term insulin use (CMS/[facility])    -  Primary    Relevant Orders    Basic Metabolic Panel    Hemoglobin A1c    Lipid Panel    TSH    Microalbumin / Creatinine Urine Ratio - Urine, Clean Catch    T4, Free    Vitamin D 25 Hydroxy    Type 2 diabetes mellitus with hyperglycemia, with long-term current use of insulin (CMS/[facility])        Relevant Medications    Dapagliflozin Propanediol (Farxiga) 10 MG tablet    Other Relevant Orders    Basic Metabolic Panel    Hemoglobin A1c    Lipid Panel    TSH    Microalbumin / Creatinine Urine Ratio - Urine, Clean Catch    T4, Free    Vitamin D 25 Hydroxy    Hyperlipemia, mixed        Relevant Orders    Basic Metabolic Panel    Hemoglobin A1c    Lipid Panel    TSH    Microalbumin / Creatinine Urine Ratio - Urine, Clean Catch    T4, Free    Vitamin D 25 Hydroxy    Basic Metabolic Panel        Type 2 diabetes mellitus-uncontrolled, complicated with high blood sugars  HbA1c is not at goal  Discontinue stegluromet given his renal function.  Continue Toujeo at 90 units at bedtime  Start Farxiga 10 mg oral daily.    If blood sugars have not improved in the next 4 to 6 weeks based on his blood sugar checks patient would alert us and we might consider starting the patient on GLP-1 analog.    Hyperlipidemia  Continue Lipitor 80 mg oral daily.  Interpreted the blood work-up/imaging results performed by the primary care/consulting physician -    Refills sent to pharmacy    Follow Up     Patient  "was given instructions and counseling regarding her condition or for health maintenance advice. Please see specific information pulled into the AVS if appropriate.       Thank you for asking me to see your patient, Kem Alvarado in consultation.         Cong Alvarenga MD  03/12/21      EMR Dragon / transcription disclaimer:     \"Dictated utilizing Dragon dictation\".         "

## 2021-03-16 ENCOUNTER — TELEPHONE (OUTPATIENT)
Dept: ENDOCRINOLOGY | Age: 66
End: 2021-03-16

## 2021-03-17 ENCOUNTER — TELEPHONE (OUTPATIENT)
Dept: ENDOCRINOLOGY | Age: 66
End: 2021-03-17

## 2021-03-22 ENCOUNTER — PRIOR AUTHORIZATION (OUTPATIENT)
Dept: ENDOCRINOLOGY | Age: 66
End: 2021-03-22

## 2021-03-26 DIAGNOSIS — I10 BENIGN ESSENTIAL HYPERTENSION: ICD-10-CM

## 2021-03-26 RX ORDER — AMLODIPINE BESYLATE 5 MG/1
TABLET ORAL
Qty: 180 TABLET | Refills: 3 | OUTPATIENT
Start: 2021-03-26

## 2021-03-26 RX ORDER — ATORVASTATIN CALCIUM 80 MG/1
TABLET, FILM COATED ORAL
Qty: 90 TABLET | Refills: 0 | Status: SHIPPED | OUTPATIENT
Start: 2021-03-26 | End: 2021-06-25

## 2021-04-18 DIAGNOSIS — E78.1 FAMILIAL HYPERTRIGLYCERIDEMIA: ICD-10-CM

## 2021-04-19 RX ORDER — ICOSAPENT ETHYL 1000 MG/1
CAPSULE ORAL
Qty: 360 CAPSULE | Refills: 1 | Status: SHIPPED | OUTPATIENT
Start: 2021-04-19 | End: 2021-10-14

## 2021-04-27 LAB
25(OH)D3+25(OH)D2 SERPL-MCNC: 61.6 NG/ML (ref 30–100)
ALBUMIN/CREAT UR: 25 MG/G CREAT (ref 0–29)
BUN SERPL-MCNC: 18 MG/DL (ref 8–27)
BUN/CREAT SERPL: 11 (ref 10–24)
CALCIUM SERPL-MCNC: 9.8 MG/DL (ref 8.6–10.2)
CHLORIDE SERPL-SCNC: 108 MMOL/L (ref 96–106)
CHOLEST SERPL-MCNC: 126 MG/DL (ref 100–199)
CO2 SERPL-SCNC: 23 MMOL/L (ref 20–29)
CREAT SERPL-MCNC: 1.64 MG/DL (ref 0.76–1.27)
CREAT UR-MCNC: 55.4 MG/DL
GLUCOSE SERPL-MCNC: 154 MG/DL (ref 65–99)
HBA1C MFR BLD: 7.5 % (ref 4.8–5.6)
HDLC SERPL-MCNC: 51 MG/DL
IMP & REVIEW OF LAB RESULTS: NORMAL
LDLC SERPL CALC-MCNC: 41 MG/DL (ref 0–99)
MICROALBUMIN UR-MCNC: 13.8 UG/ML
POTASSIUM SERPL-SCNC: 5.1 MMOL/L (ref 3.5–5.2)
REPORT: NORMAL
SODIUM SERPL-SCNC: 144 MMOL/L (ref 134–144)
T4 FREE SERPL-MCNC: 1.1 NG/DL (ref 0.82–1.77)
TRIGL SERPL-MCNC: 216 MG/DL (ref 0–149)
TSH SERPL DL<=0.005 MIU/L-ACNC: 3.06 UIU/ML (ref 0.45–4.5)
VLDLC SERPL CALC-MCNC: 34 MG/DL (ref 5–40)

## 2021-06-08 ENCOUNTER — OFFICE VISIT (OUTPATIENT)
Dept: CARDIOLOGY | Facility: CLINIC | Age: 66
End: 2021-06-08

## 2021-06-08 VITALS
SYSTOLIC BLOOD PRESSURE: 132 MMHG | WEIGHT: 269 LBS | HEART RATE: 72 BPM | HEIGHT: 75 IN | OXYGEN SATURATION: 93 % | BODY MASS INDEX: 33.45 KG/M2 | DIASTOLIC BLOOD PRESSURE: 80 MMHG

## 2021-06-08 DIAGNOSIS — E11.9 CONTROLLED TYPE 2 DIABETES MELLITUS WITHOUT COMPLICATION, WITHOUT LONG-TERM CURRENT USE OF INSULIN (HCC): ICD-10-CM

## 2021-06-08 DIAGNOSIS — I10 BENIGN ESSENTIAL HYPERTENSION: ICD-10-CM

## 2021-06-08 DIAGNOSIS — N18.31 STAGE 3A CHRONIC KIDNEY DISEASE (HCC): ICD-10-CM

## 2021-06-08 DIAGNOSIS — E78.5 DYSLIPIDEMIA: ICD-10-CM

## 2021-06-08 DIAGNOSIS — I48.92 ATRIAL FLUTTER, UNSPECIFIED TYPE (HCC): Primary | ICD-10-CM

## 2021-06-08 LAB
BUN SERPL-MCNC: 21 MG/DL (ref 8–23)
BUN/CREAT SERPL: 13.5 (ref 7–25)
CALCIUM SERPL-MCNC: 10.1 MG/DL (ref 8.6–10.5)
CHLORIDE SERPL-SCNC: 104 MMOL/L (ref 98–107)
CO2 SERPL-SCNC: 27.1 MMOL/L (ref 22–29)
CREAT SERPL-MCNC: 1.55 MG/DL (ref 0.76–1.27)
GLUCOSE SERPL-MCNC: 192 MG/DL (ref 65–99)
POTASSIUM SERPL-SCNC: 5.2 MMOL/L (ref 3.5–5.2)
SODIUM SERPL-SCNC: 141 MMOL/L (ref 136–145)

## 2021-06-08 PROCEDURE — 93000 ELECTROCARDIOGRAM COMPLETE: CPT | Performed by: INTERNAL MEDICINE

## 2021-06-08 PROCEDURE — 99214 OFFICE O/P EST MOD 30 MIN: CPT | Performed by: INTERNAL MEDICINE

## 2021-06-08 RX ORDER — ERTUGLIFLOZIN AND METFORMIN HYDROCHLORIDE 7.5; 1 MG/1; MG/1
1 TABLET, FILM COATED ORAL DAILY
COMMUNITY
Start: 2021-04-19 | End: 2021-06-21

## 2021-06-08 NOTE — PROGRESS NOTES
Subjective:     Encounter Date:06/08/2021      Patient ID: Kem Alvarado is a 65 y.o. male.    Chief Complaint:  History of Present Illness    This is a 65-year-old with a history of paroxysmal atrial flutter, diabetes mellitus type 2, hypertension, and hyperlipidemia, who presents for follow-up.       He presents today for routine office follow-up.  He denies any chest pain, shortness of breath, palpitations, orthopnea, near syncope or syncope, or lower extremity edema.  Denies any bleeding issues.  He has been back to the gym and has been exercising now 5 to 6 days a week without any significant issues.    Prior History:  The patient initially began following Dr. Fair for management of his cardiac risk factors due to a strong family history of coronary artery disease.  In 8/2015 the patient was admitted with atrial flutter associated with acute kidney injury.  It was felt that the acute kidney injury may have been responsible for his flutter.  An echocardiogram at that time showed normal left ventricular systolic function wall motion with an EF of 65 to 70%, normal diastolic filling, and no significant valvular disease.  At that time he was started on metoprolol tartrate and apixaban for anticoagulation.  Following his discharge he had some intermittent palpitations but then his symptoms resolved within a few days.  Patient did not have any recurrent episodes of atrial fibrillation and in 4/2016 Dr. Fair and the patient discussed coming off of the apixaban but the patient opted to remain on it.  He was last seen by Dr. Fair in 5/2018 and then ROLA Lo in 6/2019 at which time he was doing well.     I saw him initially in 6/2020 for follow-up.  He reports that he was doing well but admitted that he had not been exercising as much.  No changes were made to his management.      Review of Systems   Constitutional: Negative for malaise/fatigue.   HENT: Negative for hearing loss, hoarse voice, nosebleeds and  sore throat.    Eyes: Negative for pain.   Cardiovascular: Negative for chest pain, claudication, cyanosis, dyspnea on exertion, irregular heartbeat, leg swelling, near-syncope, orthopnea, palpitations, paroxysmal nocturnal dyspnea and syncope.   Respiratory: Negative for shortness of breath and snoring.    Endocrine: Negative for cold intolerance, heat intolerance, polydipsia, polyphagia and polyuria.   Skin: Negative for itching and rash.   Musculoskeletal: Negative for arthritis, falls, joint pain, joint swelling, muscle cramps, muscle weakness and myalgias.   Gastrointestinal: Negative for constipation, diarrhea, dysphagia, heartburn, hematemesis, hematochezia, melena, nausea and vomiting.   Genitourinary: Negative for frequency, hematuria and hesitancy.   Neurological: Negative for excessive daytime sleepiness, dizziness, headaches, light-headedness, numbness and weakness.   Psychiatric/Behavioral: Negative for depression. The patient is not nervous/anxious.          Current Outpatient Medications:   •  Accu-Chek Melida Plus test strip, CHECK BLOOD SUGAR 5 TIMES DAILY, Disp: 500 each, Rfl: 1  •  allopurinol (ZYLOPRIM) 100 MG tablet, Take 1 tablet by mouth Daily., Disp: 90 tablet, Rfl: 3  •  amLODIPine (NORVASC) 5 MG tablet, 1 tablet by mouth twice daily, Disp: 180 tablet, Rfl: 3  •  atorvastatin (LIPITOR) 80 MG tablet, TAKE ONE TABLET BY MOUTH ONCE DAILY, Disp: 90 tablet, Rfl: 0  •  Dapagliflozin Propanediol (Farxiga) 10 MG tablet, Take 10 mg by mouth Daily., Disp: 30 tablet, Rfl: 11  •  Eliquis 5 MG tablet tablet, TAKE ONE TABLET BY MOUTH TWICE A DAY, Disp: 180 tablet, Rfl: 2  •  fenofibrate (TRICOR) 145 MG tablet, TAKE 1 TABLET BY MOUTH DAILY., Disp: 90 tablet, Rfl: 0  •  Insulin Glargine (BASAGLAR KWIKPEN) 100 UNIT/ML injection pen, INJECT 90 UNITS UNDER THE SKIN INTO THE APPROPRIATE AREA AS DIRECTED DAILY., Disp: 83 mL, Rfl: 1  •  metoprolol tartrate (LOPRESSOR) 50 MG tablet, TAKE ONE TABLET BY MOUTH TWICE A  "DAY, Disp: 60 tablet, Rfl: 0  •  Segluromet 7.5-1000 MG tablet, Take 1 tablet by mouth Daily., Disp: , Rfl:   •  Vascepa 1 g capsule capsule, TAKE 2 CAPSULES BY MOUTH TWO TIMES A DAY WITH MEALS., Disp: 360 capsule, Rfl: 1  •  vitamin D (ERGOCALCIFEROL) 1.25 MG (85652 UT) capsule capsule, Take 1 capsule by mouth 2 (Two) Times a Week., Disp: 36 capsule, Rfl: 3    Past Medical History:   Diagnosis Date   • ADHD (attention deficit hyperactivity disorder)    • Atrial flutter (CMS/HCC)    • Benign essential hypertension    • Benign polyp of large intestine    • Diabetes mellitus (CMS/HCC)    • Hyperlipidemia    • Pancreatitis    • Type 2 diabetes mellitus (CMS/HCC)    • Vitamin D deficiency        Past Surgical History:   Procedure Laterality Date   • COLONOSCOPY     • SHOULDER SURGERY Left        Family History   Problem Relation Age of Onset   • Cancer Mother    • Diabetes Mother    • Heart attack Father    • Diabetes Father    • Heart attack Sister    • Diabetes Sister    • Nephrolithiasis Brother    • Urolithiasis Other    • No Known Problems Maternal Grandmother    • No Known Problems Maternal Grandfather    • No Known Problems Paternal Grandmother    • No Known Problems Paternal Grandfather        Social History     Tobacco Use   • Smoking status: Former Smoker   • Smokeless tobacco: Never Used   Substance Use Topics   • Alcohol use: Yes     Alcohol/week: 1.0 standard drinks     Types: 1 Cans of beer per week     Comment: occassional   • Drug use: No         ECG 12 Lead    Date/Time: 6/8/2021 2:41 PM  Performed by: Staci Palacio MD  Authorized by: Staci Palacio MD   Comparison: compared with previous ECG   Similar to previous ECG  Rhythm: sinus rhythm               Objective:     Visit Vitals  /80 (BP Location: Left arm, Patient Position: Sitting, Cuff Size: Large Adult)   Pulse 72   Ht 190.5 cm (75\")   Wt 122 kg (269 lb)   SpO2 93%   BMI 33.62 kg/m²         Constitutional:       Appearance: Normal " appearance. Well-developed.   HENT:      Head: Normocephalic and atraumatic.   Neck:      Vascular: No carotid bruit or JVD.   Pulmonary:      Effort: Pulmonary effort is normal.      Breath sounds: Normal breath sounds.   Cardiovascular:      Normal rate. Regular rhythm.      No gallop.   Pulses:     Radial: 2+ bilaterally.  Edema:     Peripheral edema absent.   Abdominal:      Palpations: Abdomen is soft.   Skin:     General: Skin is warm and dry.   Neurological:      Mental Status: Alert and oriented to person, place, and time.             Assessment:          Diagnosis Plan   1. Atrial flutter, unspecified type (CMS/HCC)     2. Benign essential hypertension     3. Dyslipidemia     4. Controlled type 2 diabetes mellitus without complication, without long-term current use of insulin (CMS/[facility])     5. Stage 3a chronic kidney disease (CMS/[facility])            Plan:       1.  Paroxysmal atrial flutter.  His only had 1 documented episode in the past.  He has opted to remain on anticoagulation with apixaban.  Continue metoprolol tartrate.  2.  Hypertension.  Well-controlled on his current regimen medications.  3.  Hyperlipidemia.  On atorvastatin which is managed by endocrinology.  4.  Chronic kidney disease  5.  Diabetes mellitus type 2    I will plan on seeing the patient back again in 1 year or sooner if further issues arise.

## 2021-06-14 DIAGNOSIS — E55.9 VITAMIN D DEFICIENCY: ICD-10-CM

## 2021-06-15 RX ORDER — ERGOCALCIFEROL 1.25 MG/1
50000 CAPSULE ORAL 2 TIMES WEEKLY
Qty: 36 CAPSULE | Refills: 1 | Status: SHIPPED | OUTPATIENT
Start: 2021-06-17 | End: 2022-01-05

## 2021-06-21 ENCOUNTER — OFFICE VISIT (OUTPATIENT)
Dept: ENDOCRINOLOGY | Age: 66
End: 2021-06-21

## 2021-06-21 ENCOUNTER — MEDICATION THERAPY MANAGEMENT (OUTPATIENT)
Dept: PHARMACY | Facility: HOSPITAL | Age: 66
End: 2021-06-21

## 2021-06-21 VITALS
BODY MASS INDEX: 33.55 KG/M2 | SYSTOLIC BLOOD PRESSURE: 122 MMHG | HEIGHT: 75 IN | WEIGHT: 269.8 LBS | DIASTOLIC BLOOD PRESSURE: 82 MMHG

## 2021-06-21 DIAGNOSIS — E11.65 TYPE 2 DIABETES MELLITUS WITH HYPERGLYCEMIA, WITH LONG-TERM CURRENT USE OF INSULIN (HCC): Primary | ICD-10-CM

## 2021-06-21 DIAGNOSIS — E03.8 HYPOTHYROIDISM DUE TO HASHIMOTO'S THYROIDITIS: ICD-10-CM

## 2021-06-21 DIAGNOSIS — Z79.4 TYPE 2 DIABETES MELLITUS WITH HYPERGLYCEMIA, WITH LONG-TERM CURRENT USE OF INSULIN (HCC): Primary | ICD-10-CM

## 2021-06-21 DIAGNOSIS — E06.3 HYPOTHYROIDISM DUE TO HASHIMOTO'S THYROIDITIS: ICD-10-CM

## 2021-06-21 DIAGNOSIS — E78.2 HYPERLIPEMIA, MIXED: ICD-10-CM

## 2021-06-21 PROCEDURE — 99214 OFFICE O/P EST MOD 30 MIN: CPT | Performed by: NURSE PRACTITIONER

## 2021-06-21 NOTE — PROGRESS NOTES
"Chief Complaint  Diabetes    Subjective          Kem Alvarado presents to [facility] ENDOCRINOLOGY  History of Present Illness     Type 2 dm   Diagnosed about 12  years ago.   Today in clinic pt reports being on basaglar 90 units QAM, farxiga 10mg daily   Checks BG -1- 3 times a day ()  Dm retinopathy - no, Last eye exam - overdue  Dm nephropathy - no, gfr 45, - proteinuria   Dm neuropathy - no, Dm neuropathy meds - no  CAD - none, FH, dad  at age 52 from [place]  CVA -none  Episodes of hypoglycemia - no  Pt is physically active going to the gym 6 days/week  Pt tries to follow DM diet for most part.     Lab Results   Component Value Date    HGBA1C 7.5 (H) 2021          Hyperlipidemia   lipitor 80 mg po daily. Fenofibrate 145 mg po daily   vascepa 2g BID  Lab Results   Component Value Date    CHLPL 126 2021    TRIG 216 (H) 2021    HDL 51 2021    LDL 41 2021          Hypothyroidism   on synthroid 125 mcg oral daily.   Lab Results   Component Value Date    TSH 3.060 2021       Objective   Vital Signs:   /82 (BP Location: Right arm, Patient Position: Sitting, Cuff Size: Adult)   Ht 190.5 cm (75\")   Wt 122 kg (269 lb 12.8 oz)   BMI 33.72 kg/m²     Physical Exam  Vitals reviewed.   Constitutional:       General: He is not in acute distress.  HENT:      Head: Normocephalic and atraumatic.   Cardiovascular:      Rate and Rhythm: Normal rate and regular rhythm.   Pulmonary:      Effort: Pulmonary effort is normal. No respiratory distress.   Musculoskeletal:         General: No signs of injury. Normal range of motion.      Cervical back: Normal range of motion and neck supple.   Skin:     General: Skin is warm and dry.   Neurological:      Mental Status: He is alert and oriented to person, place, and time. Mental status is at baseline.   Psychiatric:         Mood and Affect: Mood normal.         Behavior: Behavior normal.         Thought Content: " Thought content normal.         Judgment: Judgment normal.          Result Review :   The following data was reviewed by: JIM Courtney on 06/21/2021:  Common labs    Common Labsle 2/26/21 2/26/21 2/26/21 2/26/21 2/26/21 4/26/21 4/26/21 4/26/21 4/26/21 6/7/21    1032 1032 1032 1032 1032 1127 1127 1127 1127    Glucose   156 (A)      154 (A) 192 (A)   BUN   24 (A)      18 21   Creatinine   1.55 (A)      1.64 (A) 1.55 (A)   eGFR Non African Am   45 (A)      44 (A) 45 (A)   eGFR  Am   55 (A)      50 (A) 55 (A)   Sodium   139      144 141   Potassium   4.6      5.1 5.2   Chloride   104      108 (A) 104   Calcium   10.0      9.8 10.1   Total Protein   7.2          Albumin   4.50          Total Bilirubin   0.4          Alkaline Phosphatase   54          AST (SGOT)   38          ALT (SGPT)   30          Hemoglobin 15.4            Hematocrit 46.1            Total Cholesterol  141     126      Triglycerides  225 (A)     216 (A)      HDL Cholesterol  42     51      LDL Cholesterol   62     41      Hemoglobin A1C    7.70 (A)    7.5 (A)     Microalbumin, Urine     22.9 13.8       (A) Abnormal value       Comments are available for some flowsheets but are not being displayed.                     Assessment and Plan    Diagnoses and all orders for this visit:    1. Type 2 diabetes mellitus with hyperglycemia, with long-term current use of insulin (CMS/[facility]) (Primary)  -     Hemoglobin A1c; Future  -     Comprehensive Metabolic Panel; Future  -     Lipid Panel; Future  -     TSH; Future  -     T4, Free; Future    2. Hyperlipemia, mixed  -     Comprehensive Metabolic Panel; Future  -     Lipid Panel; Future    3. Hypothyroidism due to Hashimoto's thyroiditis        Follow Up   No follow-ups on file.     Has f/u with dr miller in november  Continue basaglar 90 units QAM, farxiga 10mg daily   Off segluromet r/t kidney function  Diabetic eye exam  Daily diabetic foot care  Pharmacy education today  Continue to follow  kidney function and consider nephrology eval   Continue statin  Continue current t4 dose  Needs close monitoring to reduce risk of complications from over or under treatment with thyroid hormone replacement    Patient was given instructions and counseling regarding his condition or for health maintenance advice. Please see specific information pulled into the AVS if appropriate.     JIM Courtney

## 2021-06-21 NOTE — PROGRESS NOTES
[facility] Pharmacy Visit [facility] Endocrinology    Kem Alvarado is a 65 y.o. male seen by Endocrinology and assessed by the Medication Management Clinic Pharmacist at [facility].  This was an initial MTM visit for Kem Alvarado.    Kem Alvarado was introduced to the [facility] and his allergies, PMH, and medications were reviewed.       Past Medical History:   Diagnosis Date   • ADHD (attention deficit hyperactivity disorder)    • Atrial flutter (CMS/HCC)    • Benign essential hypertension    • Benign polyp of large intestine    • Diabetes mellitus (CMS/HCC)    • Hyperlipidemia    • Pancreatitis    • Type 2 diabetes mellitus (CMS/HCC)    • Vitamin D deficiency      Social History     Socioeconomic History   • Marital status:      Spouse name: Not on file   • Number of children: Not on file   • Years of education: Not on file   • Highest education level: Not on file   Tobacco Use   • Smoking status: Former Smoker   • Smokeless tobacco: Never Used   Substance and Sexual Activity   • Alcohol use: Yes     Alcohol/week: 1.0 standard drinks     Types: 1 Cans of beer per week     Comment: occassional   • Drug use: No   • Sexual activity: Yes     Partners: Female     Birth control/protection: None       Medication Allergies:  Synjardy [empagliflozin-metformin hcl]        Current Outpatient Medications:   •  Accu-Chek Melida Plus test strip, CHECK BLOOD SUGAR 5 TIMES DAILY, Disp: 500 each, Rfl: 1  •  allopurinol (ZYLOPRIM) 100 MG tablet, Take 1 tablet by mouth Daily., Disp: 90 tablet, Rfl: 3  •  amLODIPine (NORVASC) 5 MG tablet, 1 tablet by mouth twice daily, Disp: 180 tablet, Rfl: 3  •  atorvastatin (LIPITOR) 80 MG tablet, TAKE ONE TABLET BY MOUTH ONCE DAILY, Disp: 90 tablet, Rfl: 0  •  Dapagliflozin Propanediol (Farxiga) 10 MG tablet, Take 10 mg by mouth Daily., Disp: 30 tablet, Rfl: 11  •  Eliquis 5 MG tablet tablet, TAKE ONE TABLET BY MOUTH TWICE  A DAY, Disp: 180 tablet, Rfl: 2  •  fenofibrate (TRICOR) 145 MG tablet, TAKE 1 TABLET BY MOUTH DAILY., Disp: 90 tablet, Rfl: 0  •  Insulin Glargine (BASAGLAR KWIKPEN) 100 UNIT/ML injection pen, INJECT 90 UNITS UNDER THE SKIN INTO THE APPROPRIATE AREA AS DIRECTED DAILY., Disp: 83 mL, Rfl: 1  •  metoprolol tartrate (LOPRESSOR) 50 MG tablet, TAKE ONE TABLET BY MOUTH TWICE A DAY, Disp: 60 tablet, Rfl: 0  •  Vascepa 1 g capsule capsule, TAKE 2 CAPSULES BY MOUTH TWO TIMES A DAY WITH MEALS., Disp: 360 capsule, Rfl: 1  •  vitamin D (ERGOCALCIFEROL) 1.25 MG (31851 UT) capsule capsule, TAKE 1 CAPSULE BY MOUTH 2 (TWO) TIMES A WEEK., Disp: 36 capsule, Rfl: 1      Labs:  There were no vitals filed for this visit.  There were no vitals filed for this visit.  Lab Results   Component Value Date    HGBA1C 7.5 (H) 04/26/2021     Lab Results   Component Value Date    GLUCOSE 135 (H) 09/01/2015    CALCIUM 10.1 06/07/2021     06/07/2021    K 5.2 06/07/2021    CO2 27.1 06/07/2021     06/07/2021    BUN 21 06/07/2021    CREATININE 1.55 (H) 06/07/2021    EGFRIFAFRI 55 (L) 06/07/2021    EGFRIFNONA 45 (L) 06/07/2021    BCR 13.5 06/07/2021     Lab Results   Component Value Date    CHLPL 126 04/26/2021    TRIG 216 (H) 04/26/2021    HDL 51 04/26/2021    LDL 41 04/26/2021         Drug-Drug Interactions:   No significant DDIs were noted.      Medication Assessment:   1. Aspirin - Not Indicated (on Eliquis)  2. Statin - Currently Taking  3. ACEi/ARB - Not currently taking      Medication Education on Specialty Medication:  Farxiga  · Discussed the medication's MOA and that it may cause more frequent urination particularly upon starting the medication  · Take one tablet in the morning with or without food    · Encouraged to drink plenty of water as to not get dehydrated especially in warmer weather or with activity  · Also discussed there may be an increased incidence of UTIs or yeast infections and to monitor for  signs/symptoms    Vascepa (Icosapent Ethyl)  What is this drug used for?:    • It is used to lower triglycerides  • It may also be used in some patients to reduce the risk of heart attack, stroke, or other heart problems  Some things to know while taking this medication:  • Notify your doctor if you are allergic to fish or shellfish  • Certain types of abnormal heartbeat (atrial fibrillation or atrial flutter) have happened.  The risk is increased in patients who have these abnormal heartbeats in the past.  • Other side effects may include:  bleeding or bruising, muscle pain, and constipation  • *All medications may cause side effects; however, many people have no side effects or only have minor side effects, please let your doctor know if you experience any of these and they do not go away  What is the dose of this medication?:  2 g by mouth twice daily with meals  How to best take this medication:  • Take with food  • Swallow whole; do not break, crush, dissolve, or chew capsules  Dietary Considerations:  • It is important to maintain a cholesterol-lowering diet during therapy as well         Assessment / Plan:  1. The patient was provided medication education via verbal information. Patient expressed understanding and had no further questions at this time.  2. Discussed the potential blood thinning effects of Vascepa especially while on Eliquis.  Discussed signs and symptoms of bleeding and when to seek medical attention and notify his cardiologist.  3. Discussed the [facility] pharmacy services that are available to him, including monitoring of refills, prior authorizations, and copay coupon cards, as applicable, as well as curb-side pick-up or mail-order as needed.  He has a great relationship with his independent pharmacy and pharmacist.  4. Contact information for the pharmacy team was provided to the patient.        Maria Esther Slade, PharmD  6/21/2021  15:06 EDT

## 2021-06-24 DIAGNOSIS — I10 BENIGN ESSENTIAL HYPERTENSION: ICD-10-CM

## 2021-06-24 DIAGNOSIS — E79.0 HYPERURICEMIA: ICD-10-CM

## 2021-06-25 RX ORDER — ATORVASTATIN CALCIUM 80 MG/1
TABLET, FILM COATED ORAL
Qty: 90 TABLET | Refills: 1 | Status: SHIPPED | OUTPATIENT
Start: 2021-06-25 | End: 2021-09-24

## 2021-06-25 RX ORDER — AMLODIPINE BESYLATE 5 MG/1
TABLET ORAL
Qty: 180 TABLET | Refills: 1 | OUTPATIENT
Start: 2021-06-25

## 2021-06-25 RX ORDER — ALLOPURINOL 100 MG/1
100 TABLET ORAL DAILY
Qty: 90 TABLET | Refills: 1 | OUTPATIENT
Start: 2021-06-25

## 2021-06-28 RX ORDER — APIXABAN 5 MG/1
TABLET, FILM COATED ORAL
Qty: 180 TABLET | Refills: 2 | Status: SHIPPED | OUTPATIENT
Start: 2021-06-28 | End: 2022-04-07 | Stop reason: SDUPTHER

## 2021-07-03 DIAGNOSIS — E79.0 HYPERURICEMIA: ICD-10-CM

## 2021-07-06 DIAGNOSIS — I10 BENIGN ESSENTIAL HYPERTENSION: ICD-10-CM

## 2021-07-06 RX ORDER — ALLOPURINOL 100 MG/1
100 TABLET ORAL DAILY
Qty: 90 TABLET | Refills: 3 | OUTPATIENT
Start: 2021-07-06

## 2021-07-07 RX ORDER — AMLODIPINE BESYLATE 5 MG/1
TABLET ORAL
Qty: 180 TABLET | Refills: 3 | Status: SHIPPED | OUTPATIENT
Start: 2021-07-07 | End: 2022-06-28

## 2021-07-16 DIAGNOSIS — E78.2 MIXED HYPERLIPIDEMIA: ICD-10-CM

## 2021-07-16 DIAGNOSIS — I10 BENIGN ESSENTIAL HYPERTENSION: ICD-10-CM

## 2021-07-19 RX ORDER — INSULIN GLARGINE 100 [IU]/ML
90 INJECTION, SOLUTION SUBCUTANEOUS DAILY
Qty: 27 ML | Refills: 1 | Status: SHIPPED | OUTPATIENT
Start: 2021-07-19 | End: 2021-11-18

## 2021-07-19 RX ORDER — LEVOTHYROXINE SODIUM 125 MCG
125 TABLET ORAL DAILY
Qty: 90 TABLET | Refills: 1 | Status: SHIPPED | OUTPATIENT
Start: 2021-07-19 | End: 2022-01-20

## 2021-07-19 RX ORDER — FENOFIBRATE 145 MG/1
145 TABLET, COATED ORAL DAILY
Qty: 90 TABLET | Refills: 1 | Status: SHIPPED | OUTPATIENT
Start: 2021-07-19 | End: 2021-11-18

## 2021-09-24 RX ORDER — ATORVASTATIN CALCIUM 80 MG/1
TABLET, FILM COATED ORAL
Qty: 90 TABLET | Refills: 1 | Status: SHIPPED | OUTPATIENT
Start: 2021-09-24 | End: 2022-09-13

## 2021-10-10 DIAGNOSIS — IMO0002 UNCONTROLLED TYPE 2 DIABETES MELLITUS WITH COMPLICATION, WITHOUT LONG-TERM CURRENT USE OF INSULIN: ICD-10-CM

## 2021-10-11 RX ORDER — BLOOD SUGAR DIAGNOSTIC
STRIP MISCELLANEOUS
Qty: 500 EACH | Refills: 3 | Status: SHIPPED | OUTPATIENT
Start: 2021-10-11

## 2021-10-12 ENCOUNTER — PRIOR AUTHORIZATION (OUTPATIENT)
Dept: ENDOCRINOLOGY | Age: 66
End: 2021-10-12

## 2021-10-14 DIAGNOSIS — E78.1 FAMILIAL HYPERTRIGLYCERIDEMIA: ICD-10-CM

## 2021-10-14 RX ORDER — ICOSAPENT ETHYL 1000 MG/1
CAPSULE ORAL
Qty: 360 CAPSULE | Refills: 1 | Status: SHIPPED | OUTPATIENT
Start: 2021-10-14 | End: 2022-04-18

## 2021-10-21 RX ORDER — LANCETS 33 GAUGE
EACH MISCELLANEOUS
Qty: 500 EACH | Refills: 1 | Status: SHIPPED | OUTPATIENT
Start: 2021-10-21 | End: 2022-02-07 | Stop reason: SDUPTHER

## 2021-10-21 RX ORDER — BLOOD SUGAR DIAGNOSTIC
STRIP MISCELLANEOUS
Qty: 500 EACH | Refills: 1 | Status: SHIPPED | OUTPATIENT
Start: 2021-10-21 | End: 2022-02-07 | Stop reason: SDUPTHER

## 2021-10-21 RX ORDER — BLOOD-GLUCOSE METER
1 EACH MISCELLANEOUS
Qty: 1 KIT | Refills: 0 | Status: SHIPPED | OUTPATIENT
Start: 2021-10-21 | End: 2022-02-07 | Stop reason: SDUPTHER

## 2021-11-18 ENCOUNTER — TELEPHONE (OUTPATIENT)
Dept: ENDOCRINOLOGY | Age: 66
End: 2021-11-18

## 2021-11-18 ENCOUNTER — OFFICE VISIT (OUTPATIENT)
Dept: ENDOCRINOLOGY | Age: 66
End: 2021-11-18

## 2021-11-18 VITALS
HEART RATE: 70 BPM | HEIGHT: 75 IN | WEIGHT: 269 LBS | DIASTOLIC BLOOD PRESSURE: 76 MMHG | SYSTOLIC BLOOD PRESSURE: 136 MMHG | OXYGEN SATURATION: 98 % | BODY MASS INDEX: 33.45 KG/M2

## 2021-11-18 DIAGNOSIS — Z79.4 TYPE 2 DIABETES MELLITUS WITH HYPERGLYCEMIA, WITH LONG-TERM CURRENT USE OF INSULIN (HCC): Primary | ICD-10-CM

## 2021-11-18 DIAGNOSIS — E78.2 MIXED HYPERLIPIDEMIA: ICD-10-CM

## 2021-11-18 DIAGNOSIS — E78.2 HYPERLIPEMIA, MIXED: ICD-10-CM

## 2021-11-18 DIAGNOSIS — E11.65 TYPE 2 DIABETES MELLITUS WITH HYPERGLYCEMIA, WITH LONG-TERM CURRENT USE OF INSULIN (HCC): Primary | ICD-10-CM

## 2021-11-18 PROCEDURE — 99214 OFFICE O/P EST MOD 30 MIN: CPT | Performed by: INTERNAL MEDICINE

## 2021-11-18 RX ORDER — FENOFIBRATE 48 MG/1
48 TABLET, COATED ORAL DAILY
Qty: 30 TABLET | Refills: 11 | Status: SHIPPED | OUTPATIENT
Start: 2021-11-18 | End: 2021-12-30

## 2021-11-18 RX ORDER — INSULIN GLARGINE 100 [IU]/ML
50 INJECTION, SOLUTION SUBCUTANEOUS 2 TIMES DAILY
Qty: 27 ML | Refills: 1 | Status: SHIPPED | OUTPATIENT
Start: 2021-11-18 | End: 2021-11-19 | Stop reason: CLARIF

## 2021-11-18 RX ORDER — DAPAGLIFLOZIN 10 MG/1
10 TABLET, FILM COATED ORAL DAILY
Qty: 30 TABLET | Refills: 11 | Status: SHIPPED | OUTPATIENT
Start: 2021-11-18 | End: 2023-01-03

## 2021-11-18 NOTE — TELEPHONE ENCOUNTER
CAN YOU CHECK ON WHAT LONG ACTING INSULIN IS COVERED FOR PATIENT   HE IS NEEDING  PA  FOR BASALGAR

## 2021-11-18 NOTE — PROGRESS NOTES
"Chief Complaint  Chief Complaint   Patient presents with   • Diabetes     FOLLOW UP/ TYPE 2 DM    Subjective          History of Present Illness    Kem Alvarado 65 y.o. presents with Type 2 dm as a F/u patient.     Type 2 dm - Diagnosed about 12 years ago.   Today in clinic pt reports being on basaglar 90 units in the morning, farxiga 10 mg po daily. .   FBG - 100 - 120  Pre meals - 120 - 140  Checks BG - 3 times a day  Sensor - x  Dm retinopathy -x ,Last eye exam - due for exam  Dm nephropathy - CKD stage 3  Dm neuropathy - x,Dm neuropathy meds - n/a  CAD -x  CVA -x  Episodes of hypoglycemia - x  Pt is physically active. weight has been stable.   Pt tries to follow DM diet for most part.   On Ace inb.    Hyperlipidemia - lipitor 80 mg po daily. Fenofibrate 145 mg po daily, vascepa      Hypothyroidism - on synthroid 125 mcg oral daily.     Reviewed primary care physician's/consulting physician documentation and lab results         I have reviewed the patient's allergies, medicines, past medical hx, family hx and social hx in detail.    Objective   Vital Signs:   /76   Pulse 70   Ht 190.5 cm (75\")   Wt 122 kg (269 lb)   SpO2 98%   BMI 33.62 kg/m²   Physical Exam   General appearance - no distress  Eyes- anicteric sclera  Ear nose and throat-external ears and nose normal.    Respiratory-normal chest on inspection.  No respiratory distress noted.  Skin-no rashes.  Neuro-alert and oriented x3            Result Review :   The following data was reviewed by: Cong Alvarenga MD on 11/18/2021:  Results Encounter on 10/21/2021   Component Date Value Ref Range Status   • Hemoglobin A1C 11/04/2021 7.6* 4.8 - 5.6 % Final    Comment:          Prediabetes: 5.7 - 6.4           Diabetes: >6.4           Glycemic control for adults with diabetes: <7.0     • Glucose 11/04/2021 CANCELED  mg/dL Final-Edited    Comment: Test not performed.  Serum was in contact with cells when received  which will make the result " inaccurate.    Result canceled by the ancillary.     • BUN 11/04/2021 25  8 - 27 mg/dL Final   • Creatinine 11/04/2021 1.63* 0.76 - 1.27 mg/dL Final   • eGFR Non African Am 11/04/2021 44* >59 mL/min/1.73 Final   • eGFR African Am 11/04/2021 51* >59 mL/min/1.73 Final    Comment: **In accordance with recommendations from the NKF-ASN Task force,**    [facility] is in the process of updating its eGFR calculation to the    2021 CKD-EPI creatinine equation that estimates kidney function    without a race variable.     • BUN/Creatinine Ratio 11/04/2021 15  10 - 24 Final   • Sodium 11/04/2021 141  134 - 144 mmol/L Final   • Potassium 11/04/2021 CANCELED  mmol/L Final-Edited    Comment: Test not performed.  Serum was in contact with cells when received  which will make the result inaccurate.    Result canceled by the ancillary.     • Chloride 11/04/2021 103  96 - 106 mmol/L Final   • Total CO2 11/04/2021 25  20 - 29 mmol/L Final   • Calcium 11/04/2021 9.6  8.6 - 10.2 mg/dL Final   • Total Protein 11/04/2021 7.2  6.0 - 8.5 g/dL Final   • Albumin 11/04/2021 4.3  3.8 - 4.8 g/dL Final   • Globulin 11/04/2021 2.9  1.5 - 4.5 g/dL Final   • A/G Ratio 11/04/2021 1.5  1.2 - 2.2 Final   • Total Bilirubin 11/04/2021 0.4  0.0 - 1.2 mg/dL Final   • Alkaline Phosphatase 11/04/2021 61  44 - 121 IU/L Final                  **Please note reference interval change**   • AST (SGOT) 11/04/2021 54* 0 - 40 IU/L Final   • ALT (SGPT) 11/04/2021 34  0 - 44 IU/L Final   • Total Cholesterol 11/04/2021 125  100 - 199 mg/dL Final   • Triglycerides 11/04/2021 189* 0 - 149 mg/dL Final   • HDL Cholesterol 11/04/2021 35* >39 mg/dL Final   • VLDL Cholesterol Lito 11/04/2021 31  5 - 40 mg/dL Final   • LDL Chol Calc ([facility]) 11/04/2021 59  0 - 99 mg/dL Final   • TSH 11/04/2021 2.800  0.450 - 4.500 uIU/mL Final   • Free T4 11/04/2021 1.25  0.82 - 1.77 ng/dL Final   • Interpretation 11/04/2021 Note   Final    Supplemental report is available.   • Interpretation  11/04/2021 Note   Final    Comment: -------------------------------  CHRONIC KIDNEY DISEASE:  EGFR, BLOOD PRESSURE, AND PROTEINURIA ASSESSMENT  Treatment suggestions cannot be provided because a complete  data set is unavailable for technical reasons.  EGFR, BLOOD PRESSURE, AND PROTEINURIA FOLLOW-UP  -  Spot Urine Panel (Albumin preferred) within 6 months;  BONE and MINERAL ASSESSMENT  Calcium is within goal and has not changed significantly,  was 9.8 and now is 9.6 mg/dL. Carbon Dioxide is within goal  and has not changed significantly, was 23 and now is 25  mmol/L.  BONE and MINERAL TREATMENT SUGGESTIONS  -  Interpretations require simultaneous measurements of serum  calcium and phosphorus.  BONE and MINERAL FOLLOW-UP  -  fasting PTH with Renal Panel is recommended by KDOQI  guidelines, at least yearly; 25-Hydroxy Vitamin D within 6  months; fasting Renal Panel within 12 months;  LIPIDS ASSESSMENT  LDL-C is optimal and has risen, was 41 and now is 59 mg/dL.  Triglyceride is borderline high and has decreased, was 216  a                           nd now is 189 mg/dL. Non-HDL Cholesterol is optimal and has  risen, was 75 and now is 90 mg/dL. HDL-C is low and has  decreased, was 51 and now is 35 mg/dL.  LIPIDS TREATMENT SUGGESTIONS  -  Therapeutic lifestyle changes are always valuable to  maintain optimal blood lipid status (diet, exercise, weight  management). Continue statin if in use. Consider measurement  of LDL particle number or Apo B to adjudicate need for  further LDL lowering therapy. If statin cannot be tolerated  or increased, alternatives include use of an intestinal  agent (ezetimibe or bile acid sequestrant), niacin, and/or  fish oil.  LIPIDS FOLLOW-UP  -  fasting Lipid Panel within 12 months;  ANEMIA ASSESSMENT  Most recent order does not include a CBC Panel or iron  studies.  -------------------------------  DISCLAIMER  These assessments and treatment suggestions are provided as  a convenience in  support of the physician-patient  relationship and are not intended to replace the physician's  clinical judgment. They                            are derived from national guidelines  in addition to other evidence and expert opinion. The  clinician should consider this information within the  context of clinical opinion and the individual patient.  SEE GUIDANCE FOR CHRONIC KIDNEY DISEASE PROGRAM: Kidney  Disease Improving Global Outcomes (KDIGO) clinical practice  guidelines are at http://kdigo.org/home/guidelines/.  National Kid[employer] Kidney Disease Outcomes Quality  Initiative (KDOQI (TM)), with its limitations and  disclaimers, are at www.kidney.org/professionals/KDOQI. This  program is intended for patients who have been diagnosed  with stages 3, 4, or pre-dialysis 5 CKD. It is not intended  for children, pregnant patients, or transplant patients.       Data reviewed: PCP documentation        Results Review:    I reviewed the patient's new clinical results.     Assessment and Plan      Problem List Items Addressed This Visit        Other    Mixed hyperlipidemia    Relevant Medications    fenofibrate (TRICOR) 48 MG tablet    Other Relevant Orders    Basic Metabolic Panel    Hemoglobin A1c    Lipid Panel    TSH    T4, Free      Other Visit Diagnoses     Type 2 diabetes mellitus with hyperglycemia, with long-term current use of insulin (HCC)    -  Primary    Relevant Medications    Insulin Glargine (BASAGLAR KWIKPEN) 100 UNIT/ML injection pen    Dapagliflozin Propanediol (Farxiga) 10 MG tablet    Other Relevant Orders    Basic Metabolic Panel    Hemoglobin A1c    Lipid Panel    TSH    T4, Free    Hyperlipemia, mixed        Relevant Medications    fenofibrate (TRICOR) 48 MG tablet    Other Relevant Orders    Basic Metabolic Panel    Hemoglobin A1c    Lipid Panel    TSH    T4, Free        Type 2 diabetes mellitus-uncontrolled with hyperglycemia  HbA1c is not at goal.  Increase the dosage of Basaglar to 50  "units twice daily  Continue Farxiga.    Hypothyroidism  Continue Synthroid 125 mcg oral daily    Hyperlipidemia  Continue Lipitor 80 mg oral daily  Decrease the dosage of fenofibrate to 48 mg oral daily-renally dosed  Continue the fish oil.      Interpreted the blood work-up/imaging results performed by the primary care/consulting physician -    Refills sent to pharmacy    Follow Up     Patient was given instructions and counseling regarding her condition or for health maintenance advice. Please see specific information pulled into the AVS if appropriate.       Thank you for asking me to see your patient, Kem Alvarado in consultation.         Cong Alvarenga MD  11/18/21      EMR Dragon / transcription disclaimer:     \"Dictated utilizing Dragon dictation\".         "

## 2021-11-18 NOTE — PATIENT INSTRUCTIONS
Basaglar 50 units bid  Continue farxiga    Thyroid - no changes    Cholesterol -   Continue fish oil and lipitor   Decrease fenofibrate to 48 mg oral daily.

## 2021-11-19 RX ORDER — INSULIN DETEMIR 100 [IU]/ML
INJECTION, SOLUTION SUBCUTANEOUS
Qty: 90 ML | Refills: 3 | Status: SHIPPED | OUTPATIENT
Start: 2021-11-19 | End: 2021-12-30

## 2021-12-08 ENCOUNTER — TELEPHONE (OUTPATIENT)
Dept: ENDOCRINOLOGY | Age: 66
End: 2021-12-08

## 2021-12-30 ENCOUNTER — OFFICE VISIT (OUTPATIENT)
Dept: ENDOCRINOLOGY | Age: 66
End: 2021-12-30

## 2021-12-30 VITALS
HEART RATE: 61 BPM | SYSTOLIC BLOOD PRESSURE: 119 MMHG | WEIGHT: 246 LBS | BODY MASS INDEX: 30.59 KG/M2 | DIASTOLIC BLOOD PRESSURE: 73 MMHG | HEIGHT: 75 IN

## 2021-12-30 DIAGNOSIS — E78.5 DYSLIPIDEMIA: ICD-10-CM

## 2021-12-30 DIAGNOSIS — R10.12 LEFT UPPER QUADRANT ABDOMINAL PAIN: ICD-10-CM

## 2021-12-30 DIAGNOSIS — Z79.4 TYPE 2 DIABETES MELLITUS WITH HYPERGLYCEMIA, WITH LONG-TERM CURRENT USE OF INSULIN (HCC): Primary | ICD-10-CM

## 2021-12-30 DIAGNOSIS — E11.65 TYPE 2 DIABETES MELLITUS WITH HYPERGLYCEMIA, WITH LONG-TERM CURRENT USE OF INSULIN (HCC): Primary | ICD-10-CM

## 2021-12-30 PROCEDURE — 99214 OFFICE O/P EST MOD 30 MIN: CPT | Performed by: NURSE PRACTITIONER

## 2021-12-30 RX ORDER — INSULIN DETEMIR 100 [IU]/ML
50 INJECTION, SOLUTION SUBCUTANEOUS DAILY
Qty: 5 PEN | Refills: 2 | Status: SHIPPED | OUTPATIENT
Start: 2021-12-30 | End: 2022-02-16 | Stop reason: SDUPTHER

## 2021-12-30 NOTE — PROGRESS NOTES
"Chief Complaint  Diabetes Mellitus (follow up)    Subjective          Kem Alvarado presents to [facility] END[facility]OLOG[facility]  History of Present Illness  I have reviewed PMH, allergies and medications UTD at this visit     Had covid up unntil dec 26th. Blood sugars have been worse with covid   Reports pain in his pancrease (left upper quadrant), h/o pancreatitis, denies constipation     Type 2 dm    Diagnosed about 12 years ago.   Today in clinic pt reports being on Levemir 50 units BID, farxiga 10 mg po daily. .   Checks BG - 3 times a day BS running in the 200s  Dm retinopathy -x, Last eye exam - due for exam  Dm nephropathy - CKD stage 3  Dm neuropathy - x,Dm neuropathy meds - n/a  CAD -x  CVA -x  Episodes of hypoglycemia - no  Pt is physically active up until he got covid. Has lost weight while sick  Pt tries to follow DM diet for most part.   On statin and vascepa         Hypothyroidism    on synthroid 125 mcg oral daily.  Reports compliance  Lab Results   Component Value Date    TSH 2.800 11/04/2021       Objective   Vital Signs:   /73   Pulse 61   Ht 190.5 cm (75\")   Wt 112 kg (246 lb)   BMI 30.75 kg/m²     Physical Exam  Vitals reviewed.   Constitutional:       General: He is not in acute distress.  HENT:      Head: Normocephalic and atraumatic.   Cardiovascular:      Rate and Rhythm: Normal rate and regular rhythm.   Pulmonary:      Effort: Pulmonary effort is normal. No respiratory distress.   Musculoskeletal:         General: No signs of injury. Normal range of motion.      Cervical back: Normal range of motion and neck supple.   Skin:     General: Skin is warm and dry.   Neurological:      Mental Status: He is alert and oriented to person, place, and time. Mental status is at baseline.   Psychiatric:         Mood and Affect: Mood normal.         Behavior: Behavior normal.         Thought Content: Thought content normal.         Judgment: Judgment normal.            Result " Review :   The following data was reviewed by: JIM Courtney on 12/30/2021:  Common labs    Common Labsle 4/26/21 4/26/21 4/26/21 4/26/21 6/7/21 11/4/21 11/4/21 11/4/21    1127 1127 1127 1127  1132 1132 1132   Glucose    154 (A) 192 (A)  CANCELED    BUN    18 21  25    Creatinine    1.64 (A) 1.55 (A)  1.63 (A)    eGFR Non  Am    44 (A) 45 (A)  44 (A)    eGFR  Am    50 (A) 55 (A)  51 (A)    Sodium    144 141  141    Potassium    5.1 5.2  CANCELED    Chloride    108 (A) 104  103    Calcium    9.8 10.1  9.6    Total Protein       7.2    Albumin       4.3    Total Bilirubin       0.4    Alkaline Phosphatase       61    AST (SGOT)       54 (A)    ALT (SGPT)       34    Total Cholesterol  126      125   Triglycerides  216 (A)      189 (A)   HDL Cholesterol  51      35 (A)   LDL Cholesterol   41      59   Hemoglobin A1C   7.5 (A)   7.6 (A)     Microalbumin, Urine 13.8          (A) Abnormal value       Comments are available for some flowsheets but are not being displayed.                     Assessment and Plan    Diagnoses and all orders for this visit:    1. Type 2 diabetes mellitus with hyperglycemia, with long-term current use of insulin (HCC) (Primary)  -     insulin detemir (Levemir FlexTouch) 100 UNIT/ML injection; Inject 50 Units under the skin into the appropriate area as directed Daily for 90 days.  Dispense: 5 pen; Refill: 2  -     Referral for Diabetic Eye Exam (Ophthalmology)    2. Left upper quadrant abdominal pain  -     Amylase  -     Lipase  -     Comprehensive Metabolic Panel  -     CBC Auto Differential    3. Dyslipidemia        Follow Up   No follow-ups on file.     Will see me in march  Change to levemir pens  Give prn rapid insulin while body recovers from covid   Eye exam  Check pancreatic labs and consider GI referral- no obvious s/s such as nausea/ dehydration but with his history with confirm with labs   Ok to stop fenofibrate on vascepa     Patient was given instructions and  counseling regarding his condition or for health maintenance advice. Please see specific information pulled into the AVS if appropriate.     JIM Courtney

## 2021-12-30 NOTE — PATIENT INSTRUCTIONS
Check Blood sugar, take up to 4x/day. Do not take more than evry 3-4 hours    -250: 3 units  BS > 250: 5 units

## 2021-12-31 LAB
ALBUMIN SERPL-MCNC: 4 G/DL (ref 3.8–4.8)
ALBUMIN/GLOB SERPL: 1.4 {RATIO} (ref 1.2–2.2)
ALP SERPL-CCNC: 78 IU/L (ref 44–121)
ALT SERPL-CCNC: 57 IU/L (ref 0–44)
AMYLASE SERPL-CCNC: 77 U/L (ref 31–110)
AST SERPL-CCNC: 68 IU/L (ref 0–40)
BASOPHILS # BLD AUTO: 0 X10E3/UL (ref 0–0.2)
BASOPHILS NFR BLD AUTO: 1 %
BILIRUB SERPL-MCNC: 0.4 MG/DL (ref 0–1.2)
BUN SERPL-MCNC: 40 MG/DL (ref 8–27)
BUN/CREAT SERPL: 16 (ref 10–24)
CALCIUM SERPL-MCNC: 10.1 MG/DL (ref 8.6–10.2)
CHLORIDE SERPL-SCNC: 101 MMOL/L (ref 96–106)
CO2 SERPL-SCNC: 23 MMOL/L (ref 20–29)
CREAT SERPL-MCNC: 2.47 MG/DL (ref 0.76–1.27)
EOSINOPHIL # BLD AUTO: 0.2 X10E3/UL (ref 0–0.4)
EOSINOPHIL NFR BLD AUTO: 3 %
ERYTHROCYTE [DISTWIDTH] IN BLOOD BY AUTOMATED COUNT: 13.1 % (ref 11.6–15.4)
GLOBULIN SER CALC-MCNC: 2.8 G/DL (ref 1.5–4.5)
GLUCOSE SERPL-MCNC: 401 MG/DL (ref 65–99)
HCT VFR BLD AUTO: 41.7 % (ref 37.5–51)
HGB BLD-MCNC: 13.8 G/DL (ref 13–17.7)
IMM GRANULOCYTES # BLD AUTO: 0 X10E3/UL (ref 0–0.1)
IMM GRANULOCYTES NFR BLD AUTO: 1 %
LIPASE SERPL-CCNC: 49 U/L (ref 13–78)
LYMPHOCYTES # BLD AUTO: 1 X10E3/UL (ref 0.7–3.1)
LYMPHOCYTES NFR BLD AUTO: 17 %
MCH RBC QN AUTO: 28.2 PG (ref 26.6–33)
MCHC RBC AUTO-ENTMCNC: 33.1 G/DL (ref 31.5–35.7)
MCV RBC AUTO: 85 FL (ref 79–97)
MONOCYTES # BLD AUTO: 0.6 X10E3/UL (ref 0.1–0.9)
MONOCYTES NFR BLD AUTO: 10 %
NEUTROPHILS # BLD AUTO: 4.4 X10E3/UL (ref 1.4–7)
NEUTROPHILS NFR BLD AUTO: 68 %
PLATELET # BLD AUTO: 432 X10E3/UL (ref 150–450)
POTASSIUM SERPL-SCNC: 5.3 MMOL/L (ref 3.5–5.2)
PROT SERPL-MCNC: 6.8 G/DL (ref 6–8.5)
RBC # BLD AUTO: 4.9 X10E6/UL (ref 4.14–5.8)
REPORT: NORMAL
SODIUM SERPL-SCNC: 138 MMOL/L (ref 134–144)
WBC # BLD AUTO: 6.3 X10E3/UL (ref 3.4–10.8)

## 2022-01-03 DIAGNOSIS — E55.9 VITAMIN D DEFICIENCY: ICD-10-CM

## 2022-01-05 RX ORDER — ERGOCALCIFEROL 1.25 MG/1
50000 CAPSULE ORAL 2 TIMES WEEKLY
Qty: 36 CAPSULE | Refills: 1 | Status: SHIPPED | OUTPATIENT
Start: 2022-01-06 | End: 2023-03-10

## 2022-01-20 RX ORDER — FENOFIBRATE 145 MG/1
TABLET, COATED ORAL
Qty: 90 TABLET | Refills: 1 | Status: SHIPPED | OUTPATIENT
Start: 2022-01-20 | End: 2022-07-19

## 2022-01-20 RX ORDER — LEVOTHYROXINE SODIUM 125 MCG
125 TABLET ORAL DAILY
Qty: 90 TABLET | Refills: 0 | Status: SHIPPED | OUTPATIENT
Start: 2022-01-20 | End: 2022-04-21

## 2022-01-31 ENCOUNTER — TELEPHONE (OUTPATIENT)
Dept: ENDOCRINOLOGY | Age: 67
End: 2022-01-31

## 2022-02-02 ENCOUNTER — TELEPHONE (OUTPATIENT)
Dept: ENDOCRINOLOGY | Age: 67
End: 2022-02-02

## 2022-02-07 DIAGNOSIS — Z79.4 TYPE 2 DIABETES MELLITUS WITH HYPERGLYCEMIA, WITH LONG-TERM CURRENT USE OF INSULIN: ICD-10-CM

## 2022-02-07 DIAGNOSIS — E11.65 TYPE 2 DIABETES MELLITUS WITH HYPERGLYCEMIA, WITH LONG-TERM CURRENT USE OF INSULIN: ICD-10-CM

## 2022-02-07 DIAGNOSIS — E55.9 VITAMIN D DEFICIENCY: Primary | ICD-10-CM

## 2022-02-07 RX ORDER — LANCETS 33 GAUGE
EACH MISCELLANEOUS
Qty: 500 EACH | Refills: 1 | Status: SHIPPED | OUTPATIENT
Start: 2022-02-07

## 2022-02-07 RX ORDER — BLOOD-GLUCOSE METER
1 EACH MISCELLANEOUS
Qty: 1 KIT | Refills: 0 | Status: SHIPPED | OUTPATIENT
Start: 2022-02-07

## 2022-02-07 RX ORDER — BLOOD SUGAR DIAGNOSTIC
STRIP MISCELLANEOUS
Qty: 500 EACH | Refills: 1 | Status: SHIPPED | OUTPATIENT
Start: 2022-02-07

## 2022-02-16 DIAGNOSIS — Z79.4 TYPE 2 DIABETES MELLITUS WITH HYPERGLYCEMIA, WITH LONG-TERM CURRENT USE OF INSULIN: ICD-10-CM

## 2022-02-16 DIAGNOSIS — E11.65 TYPE 2 DIABETES MELLITUS WITH HYPERGLYCEMIA, WITH LONG-TERM CURRENT USE OF INSULIN: ICD-10-CM

## 2022-02-16 RX ORDER — INSULIN DETEMIR 100 [IU]/ML
INJECTION, SOLUTION SUBCUTANEOUS
Qty: 10 PEN | Refills: 2 | Status: SHIPPED | OUTPATIENT
Start: 2022-02-16 | End: 2022-03-10

## 2022-02-24 ENCOUNTER — LAB (OUTPATIENT)
Dept: ENDOCRINOLOGY | Age: 67
End: 2022-02-24

## 2022-02-24 DIAGNOSIS — E11.65 TYPE 2 DIABETES MELLITUS WITH HYPERGLYCEMIA, WITH LONG-TERM CURRENT USE OF INSULIN: ICD-10-CM

## 2022-02-24 DIAGNOSIS — Z79.4 TYPE 2 DIABETES MELLITUS WITH HYPERGLYCEMIA, WITH LONG-TERM CURRENT USE OF INSULIN: ICD-10-CM

## 2022-02-25 LAB
ALBUMIN SERPL-MCNC: 4.5 G/DL (ref 3.8–4.8)
ALBUMIN/CREAT UR: 24 MG/G CREAT (ref 0–29)
ALBUMIN/GLOB SERPL: 1.6 {RATIO} (ref 1.2–2.2)
ALP SERPL-CCNC: 75 IU/L (ref 44–121)
ALT SERPL-CCNC: 26 IU/L (ref 0–44)
AST SERPL-CCNC: 29 IU/L (ref 0–40)
BILIRUB SERPL-MCNC: 0.5 MG/DL (ref 0–1.2)
BUN SERPL-MCNC: 22 MG/DL (ref 8–27)
BUN/CREAT SERPL: 16 (ref 10–24)
CALCIUM SERPL-MCNC: 10 MG/DL (ref 8.6–10.2)
CHLORIDE SERPL-SCNC: 103 MMOL/L (ref 96–106)
CHOLEST SERPL-MCNC: 148 MG/DL (ref 100–199)
CO2 SERPL-SCNC: 24 MMOL/L (ref 20–29)
CREAT SERPL-MCNC: 1.38 MG/DL (ref 0.76–1.27)
CREAT UR-MCNC: 67.8 MG/DL
GLOBULIN SER CALC-MCNC: 2.9 G/DL (ref 1.5–4.5)
GLUCOSE SERPL-MCNC: 146 MG/DL (ref 65–99)
HBA1C MFR BLD: 9.8 % (ref 4.8–5.6)
HDLC SERPL-MCNC: 40 MG/DL
IMP & REVIEW OF LAB RESULTS: NORMAL
LDLC SERPL CALC-MCNC: 74 MG/DL (ref 0–99)
MICROALBUMIN UR-MCNC: 16.4 UG/ML
POTASSIUM SERPL-SCNC: 4.8 MMOL/L (ref 3.5–5.2)
PROT SERPL-MCNC: 7.4 G/DL (ref 6–8.5)
REPORT: NORMAL
SODIUM SERPL-SCNC: 143 MMOL/L (ref 134–144)
TRIGL SERPL-MCNC: 202 MG/DL (ref 0–149)
VLDLC SERPL CALC-MCNC: 34 MG/DL (ref 5–40)

## 2022-03-02 ENCOUNTER — DOCUMENTATION (OUTPATIENT)
Dept: ENDOCRINOLOGY | Age: 67
End: 2022-03-02

## 2022-03-02 NOTE — PROGRESS NOTES
Benefits Investigation Summary    Prescription: Renewal     Dispensing pharmacy: your [place] [facility]    Copay amount: farxiga 3/5    PLAN: carroll  BIN: [id number]  PCN: [id number]  RX GROUP: [id number]    Prior Auth and Med Assistance notes:

## 2022-03-10 ENCOUNTER — OFFICE VISIT (OUTPATIENT)
Dept: ENDOCRINOLOGY | Age: 67
End: 2022-03-10

## 2022-03-10 VITALS
OXYGEN SATURATION: 97 % | HEART RATE: 57 BPM | HEIGHT: 75 IN | SYSTOLIC BLOOD PRESSURE: 102 MMHG | DIASTOLIC BLOOD PRESSURE: 74 MMHG | BODY MASS INDEX: 30.86 KG/M2 | WEIGHT: 248.2 LBS

## 2022-03-10 DIAGNOSIS — Z79.4 TYPE 2 DIABETES MELLITUS WITH HYPERGLYCEMIA, WITH LONG-TERM CURRENT USE OF INSULIN: Primary | ICD-10-CM

## 2022-03-10 DIAGNOSIS — E03.8 HYPOTHYROIDISM DUE TO HASHIMOTO'S THYROIDITIS: ICD-10-CM

## 2022-03-10 DIAGNOSIS — E11.65 TYPE 2 DIABETES MELLITUS WITH HYPERGLYCEMIA, WITH LONG-TERM CURRENT USE OF INSULIN: Primary | ICD-10-CM

## 2022-03-10 DIAGNOSIS — E78.2 MIXED HYPERLIPIDEMIA: ICD-10-CM

## 2022-03-10 DIAGNOSIS — E06.3 HYPOTHYROIDISM DUE TO HASHIMOTO'S THYROIDITIS: ICD-10-CM

## 2022-03-10 PROCEDURE — 99214 OFFICE O/P EST MOD 30 MIN: CPT | Performed by: NURSE PRACTITIONER

## 2022-03-10 RX ORDER — INSULIN DEGLUDEC 200 U/ML
110 INJECTION, SOLUTION SUBCUTANEOUS DAILY
Qty: 30 ML | Refills: 1 | Status: SHIPPED | OUTPATIENT
Start: 2022-03-10 | End: 2022-06-10 | Stop reason: SDUPTHER

## 2022-03-10 NOTE — PROGRESS NOTES
"Chief Complaint  Diabetes    Subjective          Kem Alvarado presents to [facility] ENDOCRINOLOG[facility]  History of Present Illness     I have reviewed PMH, allergies and medications UTD at this visit      Had covid up unntil dec 26th. Blood sugars have been worse with covid     a1c worsening on levemir.  I had the patient show me where he is giving himself injections that he has been giving all of his injections in the same spot in his left lower quadrant    Type 2 dm    Diagnosed about 12 years ago.   Today in clinic pt reports being on Levemir 50 units BID, farxiga 10 mg po daily. .   BS still running > 200 fasting   Dm retinopathy -x, Last eye exam - due for exam  Dm nephropathy - CKD stage 3  Dm neuropathy - x,Dm neuropathy meds - n/a  CAD -x  CVA -x  Episodes of hypoglycemia - no  Pt is physically active at the gym  Pt tries to follow DM diet for most part.   On statin and vascepa      Objective   Vital Signs:   /74   Pulse 57   Ht 190.5 cm (75\")   Wt 113 kg (248 lb 3.2 oz)   SpO2 97%   BMI 31.02 kg/m²     Physical Exam  Vitals reviewed.   Constitutional:       General: He is not in acute distress.  HENT:      Head: Normocephalic and atraumatic.   Cardiovascular:      Rate and Rhythm: Normal rate and regular rhythm.   Pulmonary:      Effort: Pulmonary effort is normal. No respiratory distress.   Musculoskeletal:         General: No signs of injury. Normal range of motion.      Cervical back: Normal range of motion and neck supple.   Skin:     General: Skin is warm and dry.   Neurological:      Mental Status: He is alert and oriented to person, place, and time. Mental status is at baseline.   Psychiatric:         Mood and Affect: Mood normal.         Behavior: Behavior normal.         Thought Content: Thought content normal.         Judgment: Judgment normal.          Result Review :   The following data was reviewed by: JIM Courtney on 03/10/2022:  Common labs    Common " Labsle 12/30/21 12/30/21 1/13/22 1/13/22 1/13/22 2/24/22 2/24/22 2/24/22 2/24/22    1043 1043 0840 0840 0840 1143 1143 1143 1143   Glucose 401 (A)       146 (A)    Glucose    225 (A)        BUN 40 (A)   26    22    Creatinine 2.47 (A)   1.39 (A)    1.38 (A)    eGFR Non  Am 26 (A)       53 (A)    eGFR  Am 30 (A)       62    Sodium 138   139    143    Potassium 5.3 (A)   4.4    4.8    Chloride 101   101    103    Calcium 10.1   9.4    10.0    Total Protein 6.8       7.4    Albumin 4.0   4.1    4.5    Total Bilirubin 0.4   0.6    0.5    Alkaline Phosphatase 78   80    75    AST (SGOT) 68 (A)   33    29    ALT (SGPT) 57 (A)   34    26    WBC  6.3 8.36         Hemoglobin  13.8 12.6 (A)         Hematocrit  41.7 39.8 (A)         Platelets  432 239         Total Cholesterol       148     Triglycerides       202 (A)     HDL Cholesterol       40     LDL Cholesterol        74     Hemoglobin A1C         9.8 (A)   Microalbumin, Urine      16.4      PSA     0.18       (A) Abnormal value       Comments are available for some flowsheets but are not being displayed.                     Assessment and Plan    Diagnoses and all orders for this visit:    1. Type 2 diabetes mellitus with hyperglycemia, with long-term current use of insulin (HCC) (Primary)  -     TSH; Future  -     T4, Free; Future  -     T3, Free; Future  -     Hemoglobin A1c; Future  -     Basic Metabolic Panel; Future  -     Lipid Panel; Future  -     Microalbumin / Creatinine Urine Ratio - Urine, Clean Catch; Future    2. Mixed hyperlipidemia    3. Hypothyroidism due to Hashimoto's thyroiditis    Other orders  -     Insulin Degludec (Tresiba FlexTouch) 200 UNIT/ML solution pen-injector pen injection; Inject 110 Units under the skin into the appropriate area as directed Daily.  Dispense: 30 mL; Refill: 1        Follow Up   No follow-ups on file.     Change to tresiba as he feels the levemir is not controlling his blood sugars   Sample of Tresiba to give,  start at 80 units daily as the patient probably has not been absorbing the Levemir related to poor insulin injection techniques  Reviewed insulin injection techniques along with rotation of site and site selection  Higher risk of diabetic complications given his worsening A1c despite healthy eating patterns and regular routine physical activity  Continue Vascepa and statin  Continue current T4 dose    Patient was given instructions and counseling regarding his condition or for health maintenance advice. Please see specific information pulled into the AVS if appropriate.     Eula Vázquez APRN

## 2022-04-08 ENCOUNTER — TELEPHONE (OUTPATIENT)
Dept: ENDOCRINOLOGY | Age: 67
End: 2022-04-08

## 2022-04-15 DIAGNOSIS — E78.1 FAMILIAL HYPERTRIGLYCERIDEMIA: ICD-10-CM

## 2022-04-18 RX ORDER — ICOSAPENT ETHYL 1000 MG/1
CAPSULE ORAL
Qty: 360 CAPSULE | Refills: 1 | Status: SHIPPED | OUTPATIENT
Start: 2022-04-18 | End: 2022-10-13

## 2022-04-21 RX ORDER — LEVOTHYROXINE SODIUM 125 MCG
125 TABLET ORAL DAILY
Qty: 90 TABLET | Refills: 0 | Status: SHIPPED | OUTPATIENT
Start: 2022-04-21 | End: 2022-07-19

## 2022-05-16 DIAGNOSIS — E11.65 TYPE 2 DIABETES MELLITUS WITH HYPERGLYCEMIA, WITH LONG-TERM CURRENT USE OF INSULIN: ICD-10-CM

## 2022-05-16 DIAGNOSIS — Z79.4 TYPE 2 DIABETES MELLITUS WITH HYPERGLYCEMIA, WITH LONG-TERM CURRENT USE OF INSULIN: ICD-10-CM

## 2022-05-17 ENCOUNTER — SPECIALTY PHARMACY (OUTPATIENT)
Dept: ENDOCRINOLOGY | Age: 67
End: 2022-05-17

## 2022-05-17 RX ORDER — INSULIN DETEMIR 100 [IU]/ML
INJECTION, SOLUTION SUBCUTANEOUS
Qty: 30 ML | Refills: 2 | OUTPATIENT
Start: 2022-05-17

## 2022-05-17 NOTE — PROGRESS NOTES
Specialty Pharmacy Refill Coordination Note     Kem is a 66 y.o. male contacted today regarding refills of  1 specialty medication(s).    Reviewed and verified with patient:         Specialty medication(s) and dose(s) confirmed: yes        Delivery Questions    Flowsheet Row Most Recent Value   Delivery method Other (Comment)  [beeline]   Delivery address correct? Yes   Number of medications in delivery 1   Medication being filled and delivered tresiba   Doses left of specialty medications 0   Is there any medication that is due not being filled? No   Supplies needed? No supplies needed   Cooler needed? Yes   Do any medications need mixed or dated? No   Additional comments 0   Questions or concerns for the pharmacist? No   Explain any questions or concerns for the pharmacist n/a   Are any medications first time fills? Yes   Shipment status Cooler packed                 Follow-up: 25 day(s)     JOHN SALINAS  Specialty Pharmacy Technician

## 2022-06-09 ENCOUNTER — OFFICE VISIT (OUTPATIENT)
Dept: CARDIOLOGY | Facility: CLINIC | Age: 67
End: 2022-06-09

## 2022-06-09 VITALS
SYSTOLIC BLOOD PRESSURE: 138 MMHG | HEIGHT: 75 IN | WEIGHT: 262.4 LBS | HEART RATE: 68 BPM | BODY MASS INDEX: 32.63 KG/M2 | OXYGEN SATURATION: 97 % | DIASTOLIC BLOOD PRESSURE: 82 MMHG

## 2022-06-09 DIAGNOSIS — I10 BENIGN ESSENTIAL HYPERTENSION: ICD-10-CM

## 2022-06-09 DIAGNOSIS — E11.9 CONTROLLED TYPE 2 DIABETES MELLITUS WITHOUT COMPLICATION, WITHOUT LONG-TERM CURRENT USE OF INSULIN: ICD-10-CM

## 2022-06-09 DIAGNOSIS — I48.92 ATRIAL FLUTTER, UNSPECIFIED TYPE: Primary | ICD-10-CM

## 2022-06-09 DIAGNOSIS — E78.5 DYSLIPIDEMIA: ICD-10-CM

## 2022-06-09 PROCEDURE — 93000 ELECTROCARDIOGRAM COMPLETE: CPT | Performed by: INTERNAL MEDICINE

## 2022-06-09 PROCEDURE — 99214 OFFICE O/P EST MOD 30 MIN: CPT | Performed by: INTERNAL MEDICINE

## 2022-06-09 RX ORDER — INSULIN DETEMIR 100 [IU]/ML
INJECTION, SOLUTION SUBCUTANEOUS
COMMUNITY
Start: 2022-04-14 | End: 2022-06-09 | Stop reason: ALTCHOICE

## 2022-06-09 NOTE — PROGRESS NOTES
Subjective:     Encounter Date:06/09/2022      Patient ID: Kem Alvarado is a 66 y.o. male.    Chief Complaint:  History of Present Illness    This is a 66-year-old with a history of paroxysmal atrial flutter, diabetes mellitus type 2, hypertension, and hyperlipidemia, who presents for follow-up.       He presents today for annual follow-up.  Overall he continues to feel well.  He has had no significant issues over the last year.  Denies any palpitations, shortness of breath, chest pain, orthopnea, near-syncope or syncope, or significantly worsening lower extremity edema.  He denies any bleeding issues with the apixaban.     Prior History:  The patient initially began following Dr. Fair for management of his cardiac risk factors due to a strong family history of coronary artery disease.  In 8/2015 the patient was admitted with atrial flutter associated with acute kidney injury.  It was felt that the acute kidney injury may have been responsible for his flutter.  An echocardiogram at that time showed normal left ventricular systolic function wall motion with an EF of 65 to 70%, normal diastolic filling, and no significant valvular disease.  At that time he was started on metoprolol tartrate and apixaban for anticoagulation.  Following his discharge he had some intermittent palpitations but then his symptoms resolved within a few days.  Patient did not have any recurrent episodes of atrial fibrillation and in 4/2016 Dr. Fair and the patient discussed coming off of the apixaban but the patient opted to remain on it.  He was last seen by Dr. Fair in 5/2018 and then [facility] in 6/2019 at which time he was doing well.     I saw him initially in 6/2020 for follow-up.  He reports that he was doing well but admitted that he had not been exercising as much.  No changes were made to his management.    In follow-up for began exercising again without any significant issues.       Review of Systems   Constitutional:  Negative for malaise/fatigue.   HENT: Negative for hearing loss, hoarse voice, nosebleeds and sore throat.    Eyes: Negative for pain.   Cardiovascular: Negative for chest pain, claudication, cyanosis, dyspnea on exertion, irregular heartbeat, leg swelling, near-syncope, orthopnea, palpitations, paroxysmal nocturnal dyspnea and syncope.   Respiratory: Negative for shortness of breath and snoring.    Endocrine: Negative for cold intolerance, heat intolerance, polydipsia, polyphagia and polyuria.   Skin: Negative for itching and rash.   Musculoskeletal: Negative for arthritis, falls, joint pain, joint swelling, muscle cramps, muscle weakness and myalgias.   Gastrointestinal: Negative for constipation, diarrhea, dysphagia, heartburn, hematemesis, hematochezia, melena, nausea and vomiting.   Genitourinary: Negative for frequency, hematuria and hesitancy.   Neurological: Negative for excessive daytime sleepiness, dizziness, headaches, light-headedness, numbness and weakness.   Psychiatric/Behavioral: Negative for depression. The patient is not nervous/anxious.          Current Outpatient Medications:   •  Accu-Chek Melida Plus test strip, Dx code E11.9 CHECK BLOOD SUGAR 5 TIMES DAILY, Disp: 500 each, Rfl: 3  •  allopurinol (ZYLOPRIM) 100 MG tablet, Take 1 tablet by mouth Daily., Disp: 90 tablet, Rfl: 3  •  amLODIPine (NORVASC) 5 MG tablet, 1 tablet by mouth twice daily, Disp: 180 tablet, Rfl: 3  •  apixaban (Eliquis) 5 MG tablet tablet, Take 1 tablet by mouth 2 (Two) Times a Day., Disp: 180 tablet, Rfl: 1  •  atorvastatin (LIPITOR) 80 MG tablet, TAKE ONE TABLET BY MOUTH ONCE DAILY, Disp: 90 tablet, Rfl: 1  •  Blood Glucose Monitoring Suppl (OneTouch Verio Flex System) w/Device kit, 1 Device 5 (Five) Times a Day. e11.8, Disp: 1 kit, Rfl: 0  •  Dapagliflozin Propanediol (Farxiga) 10 MG tablet, Take 10 mg by mouth Daily., Disp: 30 tablet, Rfl: 11  •  fenofibrate (TRICOR) 145 MG tablet, TAKE 1 TABLET BY MOUTH DAILY., Disp: 90  tablet, Rfl: 1  •  glucose blood (OneTouch Verio) test strip, Use to test blood sugar 5 times daily E11.8, Disp: 500 each, Rfl: 1  •  Insulin Degludec (Tresiba FlexTouch) 200 UNIT/ML solution pen-injector pen injection, Inject 110 Units under the skin into the appropriate area as directed Daily., Disp: 30 mL, Rfl: 1  •  Insulin Syringes, Disposable, U-100 1 ML misc, Use to inject insulin, Disp: 200 each, Rfl: 0  •  metoprolol tartrate (LOPRESSOR) 50 MG tablet, TAKE ONE TABLET BY MOUTH TWICE A DAY, Disp: 60 tablet, Rfl: 0  •  OneTouch Delica Lancets 33G misc, Use to test bloddsugar 5 times daily E11.8, Disp: 500 each, Rfl: 1  •  Synthroid 125 MCG tablet, TAKE 1 TABLET BY MOUTH DAILY., Disp: 90 tablet, Rfl: 0  •  Vascepa 1 g capsule capsule, TAKE 2 CAPSULES BY MOUTH TWO TIMES A DAY WITH MEALS., Disp: 360 capsule, Rfl: 1  •  vitamin D (ERGOCALCIFEROL) 1.25 MG (38307 UT) capsule capsule, TAKE 1 CAPSULE BY MOUTH 2 (TWO) TIMES A WEEK., Disp: 36 capsule, Rfl: 1    Past Medical History:   Diagnosis Date   • ADHD (attention deficit hyperactivity disorder)    • Atrial flutter (HCC)    • Benign essential hypertension    • Benign polyp of large intestine    • Diabetes mellitus (HCC)    • Hyperlipidemia    • Pancreatitis    • Type 2 diabetes mellitus (HCC)    • Vitamin D deficiency        Past Surgical History:   Procedure Laterality Date   • COLONOSCOPY     • SHOULDER SURGERY Left        Family History   Problem Relation Age of Onset   • Cancer Mother    • Diabetes Mother    • Heart attack Father    • Diabetes Father    • Heart attack Sister    • Diabetes Sister    • Nephrolithiasis Brother    • Urolithiasis Other    • No Known Problems Maternal Grandmother    • No Known Problems Maternal Grandfather    • No Known Problems Paternal Grandmother    • No Known Problems Paternal Grandfather        Social History     Tobacco Use   • Smoking status: Former Smoker   • Smokeless tobacco: Never Used   • Tobacco comment: caffeine use  "  Substance Use Topics   • Alcohol use: Yes     Alcohol/week: 1.0 standard drink     Types: 1 Cans of beer per week     Comment: occassional   • Drug use: No         ECG 12 Lead    Date/Time: 6/9/2022 1:42 PM  Performed by: Staci Palacio MD  Authorized by: Staci Palacio MD   Comparison: compared with previous ECG   Similar to previous ECG  Rhythm: sinus rhythm               Objective:     Visit Vitals  /82 (BP Location: Right arm, Patient Position: Sitting, Cuff Size: Large Adult)   Pulse 68   Ht 190.5 cm (75\")   Wt 119 kg (262 lb 6.4 oz)   SpO2 97%   BMI 32.80 kg/m²         Constitutional:       Appearance: Normal appearance. Well-developed.   HENT:      Head: Normocephalic and atraumatic.   Neck:      Vascular: No carotid bruit or JVD.   Pulmonary:      Effort: Pulmonary effort is normal.      Breath sounds: Normal breath sounds.   Cardiovascular:      Normal rate. Regular rhythm.      No gallop.   Pulses:     Radial: 2+ bilaterally.  Edema:     Peripheral edema absent.   Abdominal:      Palpations: Abdomen is soft.   Skin:     General: Skin is warm and dry.   Neurological:      Mental Status: Alert and oriented to person, place, and time.             Assessment:          Diagnosis Plan   1. Atrial flutter, unspecified type (HCC)     2. Benign essential hypertension     3. Dyslipidemia     4. Controlled type 2 diabetes mellitus without complication, without long-term current use of insulin (HCC)            Plan:       1.  Paroxysmal atrial flutter.  Has had only 1 documented episode to date.  He has opted to remain on anticoagulation with apixaban.  Continue that along with metoprolol tartrate.  2.  Hypertension.  Well-controlled on his current regimen of medications.  Continue the same.  3.  Hyperlipidemia.  On atorvastatin with well-controlled lipids.  4.  Chronic kidney disease.  Apparently his GFR is okay when taking his size into account.  Now followed by nephrology.  5.  Diabetes mellitus type 2. "  Followed by endocrinology.    I will plan on seeing the patient back again in 1 year or sooner if further issues arise.  5.

## 2022-06-10 RX ORDER — INSULIN DEGLUDEC 200 U/ML
110 INJECTION, SOLUTION SUBCUTANEOUS DAILY
Qty: 54 ML | Refills: 0 | Status: SHIPPED | OUTPATIENT
Start: 2022-06-10 | End: 2022-06-23

## 2022-06-14 ENCOUNTER — SPECIALTY PHARMACY (OUTPATIENT)
Dept: ENDOCRINOLOGY | Age: 67
End: 2022-06-14

## 2022-06-14 NOTE — PROGRESS NOTES
Specialty Pharmacy Refill Coordination Note     Kem is a 66 y.o. male contacted today regarding refills of  1 specialty medication(s).    Reviewed and verified with patient:         Specialty medication(s) and dose(s) confirmed: yes    Refill Questions    Flowsheet Row Most Recent Value   Changes to allergies? No   Changes to medications? No   New conditions since last clinic visit No   Unplanned office visit, urgent care, ED, or hospital admission in the last 4 weeks  No   How does patient/caregiver feel medication is working? Very good   Financial problems or insurance changes  No   Since the previous refill, were any specialty medication doses or scheduled injections missed or delayed?  No   Does this patient require a clinical escalation to a pharmacist? No          Delivery Questions    Flowsheet Row Most Recent Value   Delivery method [employer]   Delivery address correct? Yes   Preferred delivery time? Anytime   Number of medications in delivery 1   Medication being filled and delivered tresiba   Doses left of specialty medications 1 week   Is there any medication that is due not being filled? No   Supplies needed? No supplies needed   Cooler needed? Yes   Do any medications need mixed or dated? No   Additional comments 0   Questions or concerns for the pharmacist? No   Explain any questions or concerns for the pharmacist n/a   Are any medications first time fills? No   Shipment status Cooler packed                 Follow-up: 81 day(s)     JOHN SALINAS  Specialty Pharmacy Technician

## 2022-06-16 NOTE — PROGRESS NOTES
Specialty Pharmacy Patient Management Program  Endocrinology Initial Assessment     Kem Alvarado is a 66 y.o. male with Type 2 Diabetes seen by an Endocrinology provider and enrolled in the Endocrinology Patient Management program offered by [facility].  An initial outreach was conducted, including assessment of therapy appropriateness and specialty medication education for Tresiba. The patient was introduced to services offered by [facility], including: regular assessments, refill coordination, curbside pick-up or mail order delivery options, prior authorization maintenance, and financial assistance programs as applicable. The patient was also provided with contact information for the pharmacy team.     Insurance Coverage & Financial Support  [facility] / [employer] - [employer] Medicare     Relevant Past Medical History and Comorbidities  Relevant medical history and concomitant health conditions were discussed with the patient. The patient's chart has been reviewed for relevant past medical history and comorbid health conditions and updated as necessary.   Past Medical History:   Diagnosis Date   • ADHD (attention deficit hyperactivity disorder)    • Atrial flutter (HCC)    • Benign essential hypertension    • Benign polyp of large intestine    • Diabetes mellitus (HCC)    • Hyperlipidemia    • Pancreatitis    • Type 2 diabetes mellitus (HCC)    • Vitamin D deficiency      Social History     Socioeconomic History   • Marital status:    Tobacco Use   • Smoking status: Former Smoker   • Smokeless tobacco: Never Used   • Tobacco comment: caffeine use   Substance and Sexual Activity   • Alcohol use: Yes     Alcohol/week: 1.0 standard drink     Types: 1 Cans of beer per week     Comment: occassional   • Drug use: No   • Sexual activity: Yes     Partners: Female     Birth control/protection: None       Problem list reviewed by Maria Esther Slade, PharmD on 6/16/2022  at 10:03 AM    Allergies  Known allergies and reactions were discussed with the patient. The patient's chart has been reviewed for  allergy information and updated as necessary.   Synjardy [empagliflozin-metformin hcl]    Allergies reviewed by Maria Esther Slade, Fátima on 6/16/2022 at  9:30 AM    Current Medication List  This medication list has been reviewed with the patient and evaluated for any interactions or necessary modifications/recommendations, and updated to include all prescription medications, OTC medications, and supplements the patient is currently taking.  This list reflects what is contained in the patient's profile, which has also been marked as reviewed to communicate to other providers it is the most up to date version of the patient's current medication therapy.     Current Outpatient Medications:   •  Accu-Chek Melida Plus test strip, Dx code E11.9 CHECK BLOOD SUGAR 5 TIMES DAILY, Disp: 500 each, Rfl: 3  •  allopurinol (ZYLOPRIM) 100 MG tablet, Take 1 tablet by mouth Daily., Disp: 90 tablet, Rfl: 3  •  amLODIPine (NORVASC) 5 MG tablet, 1 tablet by mouth twice daily, Disp: 180 tablet, Rfl: 3  •  apixaban (Eliquis) 5 MG tablet tablet, Take 1 tablet by mouth 2 (Two) Times a Day., Disp: 180 tablet, Rfl: 1  •  atorvastatin (LIPITOR) 80 MG tablet, TAKE ONE TABLET BY MOUTH ONCE DAILY, Disp: 90 tablet, Rfl: 1  •  Blood Glucose Monitoring Suppl (OneTouch Verio Flex System) w/Device kit, 1 Device 5 (Five) Times a Day. e11.8, Disp: 1 kit, Rfl: 0  •  Dapagliflozin Propanediol (Farxiga) 10 MG tablet, Take 10 mg by mouth Daily., Disp: 30 tablet, Rfl: 11  •  fenofibrate (TRICOR) 145 MG tablet, TAKE 1 TABLET BY MOUTH DAILY., Disp: 90 tablet, Rfl: 1  •  glucose blood (OneTouch Verio) test strip, Use to test blood sugar 5 times daily E11.8, Disp: 500 each, Rfl: 1  •  Insulin Degludec (Tresiba FlexTouch) 200 UNIT/ML solution pen-injector pen injection, Inject 110 Units under the skin into the appropriate area as  directed Daily., Disp: 54 mL, Rfl: 0  •  Insulin Syringes, Disposable, U-100 1 ML misc, Use to inject insulin, Disp: 200 each, Rfl: 0  •  metoprolol tartrate (LOPRESSOR) 50 MG tablet, TAKE ONE TABLET BY MOUTH TWICE A DAY, Disp: 60 tablet, Rfl: 0  •  OneTouch Delica Lancets 33G misc, Use to test bloddsugar 5 times daily E11.8, Disp: 500 each, Rfl: 1  •  Synthroid 125 MCG tablet, TAKE 1 TABLET BY MOUTH DAILY., Disp: 90 tablet, Rfl: 0  •  Vascepa 1 g capsule capsule, TAKE 2 CAPSULES BY MOUTH TWO TIMES A DAY WITH MEALS., Disp: 360 capsule, Rfl: 1  •  vitamin D (ERGOCALCIFEROL) 1.25 MG (60081 UT) capsule capsule, TAKE 1 CAPSULE BY MOUTH 2 (TWO) TIMES A WEEK., Disp: 36 capsule, Rfl: 1    Medicines reviewed by Maria Esther Slade, PharmD on 6/16/2022 at 10:03 AM    Drug Interactions  None    Recommended Medications Assessment  • Aspirin - Not Indicated(on apixaban)  • Statin - Currently Taking  • ACEi/ARB - Not Indicated    Relevant Laboratory Values  A1C Last 3 Results    HGBA1C Last 3 Results 11/4/21 2/24/22   Hemoglobin A1C 7.6 (A) 9.8 (A)   (A) Abnormal value       Comments are available for some flowsheets but are not being displayed.           Lab Results   Component Value Date    HGBA1C 9.8 (H) 02/24/2022     Lab Results   Component Value Date    GLUCOSE 146 (H) 02/24/2022    CALCIUM 10.0 02/24/2022     02/24/2022    K 4.8 02/24/2022    CO2 24 02/24/2022     02/24/2022    BUN 22 02/24/2022    CREATININE 1.38 (H) 02/24/2022    EGFRIFAFRI 62 02/24/2022    EGFRIFNONA 53 (L) 02/24/2022    BCR 16 02/24/2022     Lab Results   Component Value Date    CHLPL 148 02/24/2022    TRIG 202 (H) 02/24/2022    HDL 40 02/24/2022    LDL 74 02/24/2022     Microalbumin    Microalbumin 2/24/22   Microalbumin, Urine 16.4             Initial Education Provided for Specialty Medication  The patient has been provided with the following education and any applicable administration techniques (i.e. self-injection) have been  demonstrated for the therapies indicated. All questions and concerns have been addressed prior to the patient receiving the medication, and the patient has verbalized understanding of the education and any materials provided.  Additional patient education shall be provided and documented upon request by the patient, provider or payer.      TRESIBA® (insulin degludec)   Medication Expectations    Why am I taking this medication?  You are taking this medication to lower blood sugar and A1c because you have type 1 or type 2 diabetes. Diabetes is not curable but with proper medication and treatment, we can keep your blood sugar within your personalized target range.    What should I expect while on this medication?  You should expect to see your blood sugar and A1c decrease over time.    How does the medication work?  Tresiba is a long-acting insulin that slowly and steadily releases after administration for 24-hours of blood sugar control. Insulin helps the sugar in your blood get into cells and provide them with energy to fuel your body.     How long will I be on this medication for?  If you have Type 1 diabetes, you will be on this medication or another insulin throughout your lifetime because your body cannot make its own insulin. If you have Type 2 diabetes, the amount of time you will be on this medication will be determined by your doctor based on blood sugar and A1c control. You will most likely be on this medication or another diabetes medication throughout your lifetime because your body does not make enough insulin. Do not abruptly stop this medication without talking to your doctor first.     How do I take this medication?  Take as directed on your prescription label. Tresiba is usually given once daily and can be taken with or without food. Tresiba is injected under the skin (subcutaneously) of your stomach, thigh, or upper arm. Use a different injection site in the same body region each day.       When  using the FlexTouch pens, prime the needle before each injection by injecting 2 units of insulin into the air. Once the needle is inserted under the skin, continue to press the button until the dial has returned to 0 and for an additional 6 seconds. Then remove the needle and discard. Use a new needle for each injection.   What are some possible side effects?  Tresiba may cause low blood sugar (hypoglycemia). Signs and symptoms that may indicate hypoglycemia include dizziness, sweating, anxiety, irritability, confusion, or headache. The most common side effects of Tresiba include reactions or skin thickening at the injection site, headache, weight gain, swelling of your hands and feet, and cough/runny nose/sore throat.    What happens if I miss a dose?  If you miss a dose, take it as soon as your remember. If it is close to your next dose, skip it. Always make sure there are at least 8 hours between doses and never take 2 doses at once.       Medication Safety    What are things I should warn my doctor immediately about?  Talk to your doctor if you are pregnant, planning to become pregnant, or breastfeeding. Also tell your doctor if you notice any signs/symptoms of an allergic reaction (rash, hives, difficulty breathing, etc.).    What are things that I should be cautious of?  Be cautious of any side effects from this medication. Talk to your doctor if any new ones develop or aren't getting better.    What are some medications that can interact with this one?  Do not take this medication with rosiglitazone. Taking Tresiba with other medications that lower your blood sugar such as SGLT-2 inhibitors (Jardiance, Invokana, Farxiga, etc.), GLP-1 agonists (Ozempic, Trulicity, Victoza, etc.), glipizide/glimepiride/glyburide, pioglitazone, and others may increase the risk of low blood sugar. Your doctor may reduce the dose of these medications when you start Tresiba to minimize low blood sugars. Always tell your doctor or  pharmacist immediately if you start taking any new medications, including over-the-counter medications, vitamins, and herbal supplements.        Medication Storage/Handling    How should I handle this medication?  Keep this medication out of reach of pets/children and keep the pen capped when not in use. Do not share your medicine with others.     How does this medication need to be stored?  Store your new, unused pens in the original carton in the refrigerator. Protect it from light. Do not freeze. You may store your opened Tresiba at room temperature or in the refrigerator for up to 56 days (8 weeks). Always remove the needle from the pen before storing.     How should I dispose of this medication?  Used Tresiba pens should be thrown away after 56 days even if insulin remains.?Place your used pen and needle in an approved sharps container. If you do not have a sharps container, you may use a household container made of heavy-duty plastic with a tight-fitting lid that is leak resistant (e.g.,?heavy-duty?plastic laundry detergent bottle).?If your doctor decides to stop this medication, take to your local police station for proper disposal. Some pharmacies also have?take-back bins for medication drop-off.??       Resources/Support    How can I remind myself to take this medication?  You can download reminder apps to help you manage your refills. You may also set an alarm on your phone to remind you.     Is financial support available?   [employer] can provide co-pay cards if you have commercial insurance or patient assistance if you have Medicare or no insurance.     Which vaccines are recommended for me?  Talk to your doctor about these vaccines: Flu, Coronavirus (COVID-19), Pneumococcal (pneumonia), Tdap, Hepatitis B, Zoster (shingles)          Adherence and Self-Administration  • Barriers to Patient Adherence and/or Self-Administration: Was unable to get Tresiba at other pharmacy for more than 15-day supply and  was being charged copay  • Methods for Supporting Patient Adherence and/or Self-Administration: We fill at  for > 80 day supply for no copay     Goals of Therapy   Goals     • Specialty Pharmacy General Goal      Reduce HbA1c < 8%  • 02/24/22 = 9.8%  • 11/04/21 = 7.6%        • Specialty Pharmacy General Goal      Consistently take medications as prescribed             Reassessment Plan & Follow-Up  1. Medication Therapy Changes: None   2. Additional Plans, Therapy Recommendations, or Therapy Problems to Be Addressed: None  3. Pharmacist to perform regular reassessments no more than (6) months from the previous assessment.  4. Care Coordinator to set up future refill outreaches, coordinate prescription delivery, and escalate clinical questions to pharmacist.     Attestation  I attest that the initiated specialty medication(s) are appropriate for the patient based on my assessment.  If the prescribed therapy is at any point deemed not appropriate based on the current or future assessments, a consultation will be initiated with the patient's specialty care provider to determine the best course of action. The revised plan of therapy will be documented along with any additional patient education provided.     Maria Esther Slade, PharmD, BCCP, BCPS   6/16/2022  10:07 EDT

## 2022-06-21 NOTE — PROGRESS NOTES
Kem Alvarado  presents to the office  for the follow-up appointment for Type 2 diabetes mellitus.     The diabete's condition location is throughout the system with the  clinical course has fluctuated, the severity is Mild, and the modifying/allievating factors are oral medications.  Medications and  Dosages were  reviewed with Kem Alvarado and suggested that compliance most of the time.    The patient reports associated symptoms of hyperglycemia have been none and associated symptoms of hypoglycemia have been none, with their  hypoglycemia threshold for symptoms is n/a  mg/dl .     The patient is currently on insulin  .      Compliance with blood glucose monitoring: fair.     Meal panning: The patient is using no plan.    The patient is currently taking home blood tests - Blood glucose testin-2 times daily, that are:  fasting- 1st thing in morning before eating or drinking  before each meal      Last instructions given were: .    Start toujeo - 30 units in the AM increase 2 units daily if fasting blood sugar is over   110mg/dl.     Start ozempic .25mg inj weekly x3, increase to .50mg injection weekly x3 weeks. Then advance to 1mg inj weekly for maintenance.      Continue the starlix    Home blood glucose testing daily: 1-2  FB to 300 mg/dl  after lunch 90 to 110 mg/dl    Last reported blood glucose readings are:     Patterns reported per patient are n/a          The following portions of the patient's history were reviewed and updated as appropriate: current medications, past family history, past medical history, past social history, past surgical history and problem list.      Current Outpatient Medications:   •  allopurinol (ZYLOPRIM) 100 MG tablet, Take 1 tablet by mouth Daily., Disp: 30 tablet, Rfl: 5  •  amLODIPine (NORVASC) 5 MG tablet, TAKE 1 TABLET BY MOUTH TWO TIMES A DAY., Disp: 60 tablet, Rfl: 11  •  ELIQUIS 5 MG tablet tablet, TAKE ONE TABLET BY MOUTH TWO TIMES A DAY., Disp: 180  tablet, Rfl: 3  •  fenofibrate (TRICOR) 145 MG tablet, Take 1 tablet by mouth Daily., Disp: 30 tablet, Rfl: 5  •  glucose blood (ACCU-CHEK MARIA L) test strip, Check blood sugar 4-5 times daily, Disp: 200 each, Rfl: 5  •  levothyroxine (SYNTHROID, LEVOTHROID) 75 MCG tablet, Take 1 tablet by mouth Daily., Disp: 30 tablet, Rfl: 5  •  metoprolol tartrate (LOPRESSOR) 50 MG tablet, Take 1 tablet by mouth 2 (Two) Times a Day., Disp: 60 tablet, Rfl: 5  •  rosuvastatin (CRESTOR) 40 MG tablet, Take 1 tablet by mouth Daily., Disp: 30 tablet, Rfl: 4  •  VASCEPA 1 g capsule capsule, Take 2 g by mouth 2 (Two) Times a Day With Meals., Disp: 120 capsule, Rfl: 3  •  vitamin D (ERGOCALCIFEROL) 94721 units capsule capsule, Take 1 capsule by mouth 2 (Two) Times a Week., Disp: 36 capsule, Rfl: 3  •  dapagliflozin-metformin HCl ER (XIGDUO XR) 5-1000 MG tablet, Take 1 tablet by mouth 2 (Two) Times a Day., Disp: 60 tablet, Rfl: 4  •  exenatide er (BYDUREON BCISE) 2 MG/0.85ML auto-injector injection, Inject 0.85 mL under the skin into the appropriate area as directed 1 (One) Time Per Week., Disp: 4 pen, Rfl: 4  •  Insulin Degludec 200 UNIT/ML solution pen-injector, Inject 20 Units under the skin into the appropriate area as directed Daily. Titrate as instructed with max of 100 units daily, Disp: 9 pen, Rfl: 4    Patient Active Problem List    Diagnosis   • Hyperuricemia [E79.0]   • Vitamin D deficiency [E55.9]   • Male hypogonadism [E29.1]   • Low testosterone [R79.89]   • Uncontrolled type 2 diabetes mellitus with complication, without long-term current use of insulin (CMS/HCC) [E11.8, E11.65]   • Familial hypertriglyceridemia [E78.1]   • Mixed hyperlipidemia [E78.2]   • Hyperlipidemia associated with type 2 diabetes mellitus (CMS/HCC) [E11.69, E78.5]   • Atrial flutter (CMS/HCC) [I48.92]   • Benign essential hypertension [I10]   • Chronic kidney disease, stage III (moderate) (CMS/HCC) [N18.3]   • Controlled type 2 diabetes mellitus without  "complication (CMS/HCC) [E11.9]   • Hypothyroidism [E03.9]       Review of Systems   A comprehensive review of the 14 systems was negative except of listed below:  Endocrine: none     Objective:     Wt Readings from Last 3 Encounters:   06/20/19 110 kg (241 lb 9.6 oz)   06/03/19 110 kg (242 lb)   03/12/19 109 kg (241 lb)     Temp Readings from Last 3 Encounters:   02/01/17 97.5 °F (36.4 °C) (Oral)   10/03/16 97.8 °F (36.6 °C)   03/17/16 97.8 °F (36.6 °C) (Oral)     BP Readings from Last 3 Encounters:   06/20/19 140/76   06/03/19 130/62   03/12/19 126/78     Pulse Readings from Last 3 Encounters:   06/03/19 56   05/24/18 76   05/23/17 76        /76   Ht 190.5 cm (75\")   Wt 110 kg (241 lb 9.6 oz)   BMI 30.20 kg/m²        Physical Exam   Constitutional: He is oriented to person, place, and time. He appears well-developed and well-nourished. No distress.   HENT:   Head: Normocephalic and atraumatic.   Eyes: EOM are normal. Pupils are equal, round, and reactive to light.   Neck: Normal range of motion. Neck supple. No thyromegaly present.   Cardiovascular: Normal rate, regular rhythm, normal heart sounds and intact distal pulses.   No murmur heard.  Pulmonary/Chest: Effort normal and breath sounds normal.   Abdominal: Soft. Bowel sounds are normal.   Musculoskeletal: Normal range of motion.   Neurological: He is alert and oriented to person, place, and time.   Skin: Skin is warm and dry. Capillary refill takes 2 to 3 seconds. He is not diaphoretic.   Psychiatric: He has a normal mood and affect. His behavior is normal. Judgment and thought content normal.   Nursing note and vitals reviewed.          Lab Review  Results for orders placed or performed in visit on 06/07/19   Testosterone, Free, Total   Result Value Ref Range    Testosterone, Total 170 (L) 264 - 916 ng/dL    Testosterone, Free 5.8 (L) 6.6 - 18.1 pg/mL   Luteinizing Hormone   Result Value Ref Range    LH 6.7 1.7 - 8.6 mIU/mL   Follicle Stimulating " Hormone   Result Value Ref Range    FSH 7.0 1.5 - 12.4 mIU/mL   Hemoglobin & Hematocrit, Blood   Result Value Ref Range    Hemoglobin 12.4 (L) 13.0 - 17.7 g/dL    Hematocrit 39.5 37.5 - 51.0 %   T3, Free   Result Value Ref Range    T3, Free 2.2 2.0 - 4.4 pg/mL   Thyroid Antibodies   Result Value Ref Range    Thyroid Peroxidase Antibody 8 0 - 34 IU/mL    Thyroglobulin Ab <1.0 0.0 - 0.9 IU/mL   T4, Free   Result Value Ref Range    Free T4 1.23 0.93 - 1.70 ng/dL   TSH   Result Value Ref Range    TSH 2.570 0.270 - 4.200 mIU/mL   Vitamin D 25 Hydroxy   Result Value Ref Range    25 Hydroxy, Vitamin D 54.7 30.0 - 100.0 ng/ml   Uric Acid   Result Value Ref Range    Uric Acid 4.3 3.4 - 7.0 mg/dL   Microalbumin / Creatinine Urine Ratio - Urine, Clean Catch   Result Value Ref Range    Creatinine, Urine 56.4 Not Estab. mg/dL    Microalbumin, Urine 6.1 Not Estab. ug/mL    Microalbumin/Creatinine Ratio 10.8 0.0 - 30.0 mg/g creat   Lipid Panel   Result Value Ref Range    Total Cholesterol 110 0 - 200 mg/dL    Triglycerides 393 (H) 0 - 150 mg/dL    HDL Cholesterol 30 (L) 40 - 60 mg/dL    VLDL Cholesterol 78.6 mg/dL    LDL Cholesterol  <5 0 - 100 mg/dL   Insulin, Total   Result Value Ref Range    Insulin 16.6 2.6 - 24.9 uIU/mL   Comprehensive Metabolic Panel   Result Value Ref Range    Glucose 328 (H) 65 - 99 mg/dL    BUN 22 8 - 23 mg/dL    Creatinine 1.46 (H) 0.76 - 1.27 mg/dL    eGFR Non African Am 49 (L) >60 mL/min/1.73    eGFR African Am 59 (L) >60 mL/min/1.73    BUN/Creatinine Ratio 15.1 7.0 - 25.0    Sodium 138 136 - 145 mmol/L    Potassium 4.8 3.5 - 5.2 mmol/L    Chloride 101 98 - 107 mmol/L    Total CO2 26.2 22.0 - 29.0 mmol/L    Calcium 10.0 8.6 - 10.5 mg/dL    Total Protein 6.6 6.0 - 8.5 g/dL    Albumin 4.20 3.50 - 5.20 g/dL    Globulin 2.4 gm/dL    A/G Ratio 1.8 g/dL    Total Bilirubin 0.4 0.2 - 1.2 mg/dL    Alkaline Phosphatase 55 39 - 117 U/L    AST (SGOT) 32 1 - 40 U/L    ALT (SGPT) 24 1 - 41 U/L   Hemoglobin A1c    Result Value Ref Range    Hemoglobin A1C 11.40 (H) 4.80 - 5.60 %   C-Peptide   Result Value Ref Range    C-Peptide 6.0 (H) 1.1 - 4.4 ng/mL   Fructosamine   Result Value Ref Range    Fructosamine 409 (H) 0 - 285 umol/L   Cardiovascular Risk Assessment   Result Value Ref Range    Interpretation Note    Diabetes Patient Education   Result Value Ref Range    PDF Image Not applicable            Assessment:   Kem was seen today for follow-up.    Diagnoses and all orders for this visit:    Uncontrolled type 2 diabetes mellitus with complication, without long-term current use of insulin (CMS/[facility])  -     Insulin Degludec 200 UNIT/ML solution pen-injector; Inject 20 Units under the skin into the appropriate area as directed Daily. Titrate as instructed with max of 100 units daily  -     dapagliflozin-metformin HCl ER (XIGDUO XR) 5-1000 MG tablet; Take 1 tablet by mouth 2 (Two) Times a Day.  -     exenatide er (BYDUREON BCISE) 2 MG/0.85ML auto-injector injection; Inject 0.85 mL under the skin into the appropriate area as directed 1 (One) Time Per Week.  -     Fructosamine; Future  -     Comprehensive Metabolic Panel; Future  -     Hemoglobin A1c; Future  -     Lipid Panel; Future  -     Microalbumin / Creatinine Urine Ratio - Urine, Clean Catch; Future  -     Uric Acid; Future  -     Vitamin D 25 Hydroxy; Future  -     TSH; Future  -     T4, Free; Future  -     Thyroid Antibodies; Future  -     T3, Free; Future    Acquired hypothyroidism  -     levothyroxine (SYNTHROID, LEVOTHROID) 75 MCG tablet; Take 1 tablet by mouth Daily.  -     Comprehensive Metabolic Panel; Future  -     TSH; Future  -     T4, Free; Future  -     Thyroid Antibodies; Future  -     T3, Free; Future    Benign essential hypertension  -     metoprolol tartrate (LOPRESSOR) 50 MG tablet; Take 1 tablet by mouth 2 (Two) Times a Day.  -     rosuvastatin (CRESTOR) 40 MG tablet; Take 1 tablet by mouth Daily.  -     fenofibrate (TRICOR) 145 MG tablet; Take 1  tablet by mouth Daily.  -     Comprehensive Metabolic Panel; Future    Familial hypertriglyceridemia  -     VASCEPA 1 g capsule capsule; Take 2 g by mouth 2 (Two) Times a Day With Meals.  -     Comprehensive Metabolic Panel; Future    Hyperuricemia  -     allopurinol (ZYLOPRIM) 100 MG tablet; Take 1 tablet by mouth Daily.  -     Comprehensive Metabolic Panel; Future  -     Uric Acid; Future    Mixed hyperlipidemia  -     metoprolol tartrate (LOPRESSOR) 50 MG tablet; Take 1 tablet by mouth 2 (Two) Times a Day.  -     rosuvastatin (CRESTOR) 40 MG tablet; Take 1 tablet by mouth Daily.  -     fenofibrate (TRICOR) 145 MG tablet; Take 1 tablet by mouth Daily.  -     Comprehensive Metabolic Panel; Future    Vitamin D deficiency  -     vitamin D (ERGOCALCIFEROL) 29672 units capsule capsule; Take 1 capsule by mouth 2 (Two) Times a Week.  -     Comprehensive Metabolic Panel; Future  -     Vitamin D 25 Hydroxy; Future          Plan:   In summary/ Medication changes: I met with this patient is metabolically stable and doing well at this time.  Laboratory testing was reviewed dated 2019, discussed with questions and answers completed.  Discussed and formulate a treatment plan with patient, patient verbally stated understood all instructions.    1. Diagnoses and all orders for this visit:    Uncontrolled type 2 diabetes mellitus with complication, without long-term current use of insulin (CMS/[facility])- chronic, uncontrolled.  Medication changes were as follows    Start Tresiba U2 100 injection 20 units in the morning-increase 2 units every 2 days of fasting blood glucose is over 100 mg/Xiomara.    Start xigduo extended release 1001 twice daily with breakfast and dinner or per patient's preference can take 2 with breakfast or 2 with dinner    Start Bcise 0.85 mg injection weekly    Stop Starlix    Instructions:  home blood tests -  Blood glucose testin times daily, that are:  fasting- 1st thing in morning before eating or  drinking  before each meal and 1 or 2 hours after meal  bedtime  anytime you feel symptoms of hyperglycemia or hypoglycemia (high or low blood sugars)      Note: patient did not start toujeo and ozempic from last appointment.     Acquired hypothyroidism- chronic, stable no medication changes at this time. Refills prescribed. Future labs ordered for upcoming appointment and assessment.      Benign essential hypertension- chronic, stable no medication changes at this time. Refills prescribed. Future labs ordered for upcoming appointment and assessment.     -     metoprolol tartrate (LOPRESSOR) 50 MG tablet; Take 1 tablet by mouth 2 (Two) Times a Day.  -     rosuvastatin (CRESTOR) 40 MG tablet; Take 1 tablet by mouth Daily.  -     fenofibrate (TRICOR) 145 MG tablet; Take 1 tablet by mouth Daily.       Hyperuricemia- chronic, stable no medication changes at this time. Refills prescribed. Future labs ordered for upcoming appointment and assessment.     -     allopurinol (ZYLOPRIM) 100 MG tablet; Take 1 tablet by mouth Daily.    Mixed hyperlipidemia- chronic, stable no medication changes at this time. Refills prescribed. Future labs ordered for upcoming appointment and assessment.     -     metoprolol tartrate (LOPRESSOR) 50 MG tablet; Take 1 tablet by mouth 2 (Two) Times a Day.  -     rosuvastatin (CRESTOR) 40 MG tablet; Take 1 tablet by mouth Daily.  -     fenofibrate (TRICOR) 145 MG tablet; Take 1 tablet by mouth Daily.    Vitamin D deficiency- chronic, stable no medication changes at this time. Refills prescribed. Future labs ordered for upcoming appointment and assessment.     -     vitamin D (ERGOCALCIFEROL) 68710 units capsule capsule; Take 1 capsule by mouth 2 (Two) Times a Week.      Education:  interpretation of lab results, blood sugar goals, complications of diabetes mellitus, hypoglycemia prevention and treatment, exercise, illness management, self-monitoring of blood glucose skills, nutrition, carbohydrate  counting, site rotation and use of insulin pen        The total face to face time spent was  25 minutes  with additional education given: 14 minutes (greater than 50% of the total time) was spent with counseling and coordination of care on: SMBG with goals, Side effects profiles with medications, medication use and purposes,    Return in about 3 months (around 9/20/2019), or if symptoms worsen or fail to improve, for Recheck.      Dragon transcription disclaimer     Much of this encounter note is an electronic transcription/translation of spoken language to printed text. The electronic translation of spoken language may permit erroneous, or at times, nonsensical words or phrases to be inadvertently transcribed. Although I have reviewed the note for such errors, some may still exist.   - Alkp elevated likely in the setting of acute cholecystitis   - s/p perc avery tube 6/5  - per hepatology, no clinical suspicion for cirrhosis at this time and no current need to pursue any further hepatic testing pre-transplant

## 2022-06-23 ENCOUNTER — OFFICE VISIT (OUTPATIENT)
Dept: ENDOCRINOLOGY | Age: 67
End: 2022-06-23

## 2022-06-23 ENCOUNTER — SPECIALTY PHARMACY (OUTPATIENT)
Dept: ENDOCRINOLOGY | Age: 67
End: 2022-06-23

## 2022-06-23 VITALS
DIASTOLIC BLOOD PRESSURE: 75 MMHG | WEIGHT: 262.2 LBS | HEART RATE: 60 BPM | SYSTOLIC BLOOD PRESSURE: 125 MMHG | HEIGHT: 75 IN | TEMPERATURE: 97.7 F | OXYGEN SATURATION: 96 % | BODY MASS INDEX: 32.6 KG/M2

## 2022-06-23 DIAGNOSIS — Z79.4 TYPE 2 DIABETES MELLITUS WITH HYPERGLYCEMIA, WITH LONG-TERM CURRENT USE OF INSULIN: Primary | ICD-10-CM

## 2022-06-23 DIAGNOSIS — E11.65 TYPE 2 DIABETES MELLITUS WITH HYPERGLYCEMIA, WITH LONG-TERM CURRENT USE OF INSULIN: Primary | ICD-10-CM

## 2022-06-23 DIAGNOSIS — E78.2 MIXED HYPERLIPIDEMIA: ICD-10-CM

## 2022-06-23 PROCEDURE — 99214 OFFICE O/P EST MOD 30 MIN: CPT | Performed by: NURSE PRACTITIONER

## 2022-06-23 RX ORDER — INSULIN DEGLUDEC 200 U/ML
130 INJECTION, SOLUTION SUBCUTANEOUS DAILY
Qty: 54 ML | Refills: 3 | Status: SHIPPED | OUTPATIENT
Start: 2022-06-23

## 2022-06-23 NOTE — PATIENT INSTRUCTIONS
Diabetes Mellitus and Nutrition, Adult  When you have diabetes, or diabetes mellitus, it is very important to have healthy eating habits because your blood sugar (glucose) levels are greatly affected by what you eat and drink. Eating healthy foods in the right amounts, at about the same times every day, can help you:  Control your blood glucose.  Lower your risk of heart disease.  Improve your blood pressure.  Reach or maintain a healthy weight.  What can affect my meal plan?  Every person with diabetes is different, and each person has different needs for a meal plan. Your health care provider may recommend that you work with a dietitian to make a meal plan that is best for you. Your meal plan may vary depending on factors such as:  The calories you need.  The medicines you take.  Your weight.  Your blood glucose, blood pressure, and cholesterol levels.  Your activity level.  Other health conditions you have, such as heart or kidney disease.  How do carbohydrates affect me?  Carbohydrates, also called carbs, affect your blood glucose level more than any other type of food. Eating carbs naturally raises the amount of glucose in your blood. Carb counting is a method for keeping track of how many carbs you eat. Counting carbs is important to keep your blood glucose at a healthy level, especially if you use insulin or take certain oral diabetes medicines.  It is important to know how many carbs you can safely have in each meal. This is different for every person. Your dietitian can help you calculate how many carbs you should have at each meal and for each snack.  How does alcohol affect me?  Alcohol can cause a sudden decrease in blood glucose (hypoglycemia), especially if you use insulin or take certain oral diabetes medicines. Hypoglycemia can be a life-threatening condition. Symptoms of hypoglycemia, such as sleepiness, dizziness, and confusion, are similar to symptoms of having too much alcohol.  Do not drink  "alcohol if:  Your health care provider tells you not to drink.  You are pregnant, may be pregnant, or are planning to become pregnant.  If you drink alcohol:  Do not drink on an empty stomach.  Limit how much you use to:  0-1 drink a day for women.  0-2 drinks a day for men.  Be aware of how much alcohol is in your drink. In the [place] one drink equals one 12 oz bottle of beer (355 mL), one 5 oz glass of wine (148 mL), or one 1½ oz glass of hard liquor (44 mL).  Keep yourself hydrated with water, diet soda, or unsweetened iced tea.  Keep in mind that regular soda, juice, and other mixers may contain a lot of sugar and must be counted as carbs.  What are tips for following this plan?    Reading food labels  Start by checking the serving size on the \"Nutrition Facts\" label of packaged foods and drinks. The amount of calories, carbs, fats, and other nutrients listed on the label is based on one serving of the item. Many items contain more than one serving per package.  Check the total grams (g) of carbs in one serving. You can calculate the number of servings of carbs in one serving by dividing the total carbs by 15. For example, if a food has 30 g of total carbs per serving, it would be equal to 2 servings of carbs.  Check the number of grams (g) of saturated fats and trans fats in one serving. Choose foods that have a low amount or none of these fats.  Check the number of milligrams (mg) of salt (sodium) in one serving. Most people should limit total sodium intake to less than 2,300 mg per day.  Always check the nutrition information of foods labeled as \"low-fat\" or \"nonfat.\" These foods may be higher in added sugar or refined carbs and should be avoided.  Talk to your dietitian to identify your daily goals for nutrients listed on the label.  Shopping  Avoid buying canned, pre-made, or processed foods. These foods tend to be high in fat, sodium, and added sugar.  Shop around the outside edge of the grocery store. This " is where you will most often find fresh fruits and vegetables, bulk grains, fresh meats, and fresh dairy.  Cooking  Use low-heat cooking methods, such as baking, instead of high-heat cooking methods like deep frying.  Cook using healthy oils, such as olive, canola, or sunflower oil.  Avoid cooking with butter, cream, or high-fat meats.  Meal planning  Eat meals and snacks regularly, preferably at the same times every day. Avoid going long periods of time without eating.  Eat foods that are high in fiber, such as fresh fruits, vegetables, beans, and whole grains. Talk with your dietitian about how many servings of carbs you can eat at each meal.  Eat 4-6 oz (112-168 g) of lean protein each day, such as lean meat, chicken, fish, eggs, or tofu. One ounce (oz) of lean protein is equal to:  1 oz (28 g) of meat, chicken, or fish.  1 egg.  ¼ cup (62 g) of tofu.  Eat some foods each day that contain healthy fats, such as avocado, nuts, seeds, and fish.  What foods should I eat?  Fruits  Berries. Apples. Oranges. Peaches. Apricots. Plums. Grapes. Speculator. Papaya. Pomegranate. Kiwi. Cherries.  Vegetables  Lettuce. Spinach. Leafy greens, including kale, chard, ricardo greens, and mustard greens. Beets. Cauliflower. Cabbage. Broccoli. Carrots. Green beans. Tomatoes. Peppers. Onions. Cucumbers. Russell sprouts.  Grains  Whole grains, such as whole-wheat or whole-grain bread, crackers, tortillas, cereal, and pasta. Unsweetened oatmeal. Quinoa. Brown or wild rice.  Meats and other proteins  Seafood. Poultry without skin. Lean cuts of poultry and beef. Tofu. Nuts. Seeds.  Dairy  Low-fat or fat-free dairy products such as milk, yogurt, and cheese.  The items listed above may not be a complete list of foods and beverages you can eat. Contact a dietitian for more information.  What foods should I avoid?  Fruits  Fruits canned with syrup.  Vegetables  Canned vegetables. Frozen vegetables with butter or cream sauce.  Grains  Refined  white flour and flour products such as bread, pasta, snack foods, and cereals. Avoid all processed foods.  Meats and other proteins  Fatty cuts of meat. Poultry with skin. Breaded or fried meats. Processed meat. Avoid saturated fats.  Dairy  Full-fat yogurt, cheese, or milk.  Beverages  Sweetened drinks, such as soda or iced tea.  The items listed above may not be a complete list of foods and beverages you should avoid. Contact a dietitian for more information.  Questions to ask a health care provider  Do I need to meet with a diabetes educator?  Do I need to meet with a dietitian?  What number can I call if I have questions?  When are the best times to check my blood glucose?  Where to find more information:  American Diabetes Association: diabetes.org  Academy of Nutrition and Dietetics: www.eatright.org  National [place] of Diabetes and Digestive and Kidney Diseases: www.niddk.nih.gov  Association of Diabetes Care and Education Specialists: www.diabeteseducator.org  Summary  It is important to have healthy eating habits because your blood sugar (glucose) levels are greatly affected by what you eat and drink.  A healthy meal plan will help you control your blood glucose and maintain a healthy lifestyle.  Your health care provider may recommend that you work with a dietitian to make a meal plan that is best for you.  Keep in mind that carbohydrates (carbs) and alcohol have immediate effects on your blood glucose levels. It is important to count carbs and to use alcohol carefully.  This information is not intended to replace advice given to you by your health care provider. Make sure you discuss any questions you have with your health care provider.  Document Revised: 11/24/2020 Document Reviewed: 11/24/2020  Elsevier Patient Education © 2021 [employer]

## 2022-06-23 NOTE — PROGRESS NOTES
"Chief Complaint  Diabetes (Type2: Patient doesn't have meter with him today states that he checks his bs 3xs daily with meals )    Subjective        Kem Alvarado presents to [facility] ENDOCRINOLOGY       History of Present Illness     Type 2 dm    Diagnosed about 12 years ago.   Today in clinic pt reports being on Tresiba 130u in the morning, farxiga 10 mg po daily     BS fasting <120  Reports all BS are 120s  Dm retinopathy -x, Last eye exam - still due for exam  Dm nephropathy - CKD stage 3, sees renal   Dm neuropathy - x  CAD -x  CVA -x  Episodes of hypoglycemia - no  Pt is physically active at the gym  On statin and vascepa   Lab Results   Component Value Date    HGBA1C 9.6 (H) 06/17/2022         Hypothyroid  Synthroid 125 mcg daily  Lab Results   Component Value Date    TSH 2.290 06/17/2022       Objective   Vital Signs:  /75   Pulse 60   Temp 97.7 °F (36.5 °C) (Temporal)   Ht 190.5 cm (75\")   Wt 119 kg (262 lb 3.2 oz)   SpO2 96%   BMI 32.77 kg/m²   Estimated body mass index is 32.77 kg/m² as calculated from the following:    Height as of this encounter: 190.5 cm (75\").    Weight as of this encounter: 119 kg (262 lb 3.2 oz).          Physical Exam  Vitals reviewed.   Constitutional:       General: He is not in acute distress.  HENT:      Head: Normocephalic and atraumatic.   Cardiovascular:      Rate and Rhythm: Normal rate and regular rhythm.   Pulmonary:      Effort: Pulmonary effort is normal. No respiratory distress.   Musculoskeletal:         General: No signs of injury. Normal range of motion.      Cervical back: Normal range of motion and neck supple.   Skin:     General: Skin is warm and dry.   Neurological:      Mental Status: He is alert and oriented to person, place, and time. Mental status is at baseline.   Psychiatric:         Mood and Affect: Mood normal.         Behavior: Behavior normal.         Thought Content: Thought content normal.         Judgment: " Judgment normal.          Result Review :  The following data was reviewed by: JIM Courtney on 06/23/2022:  Common labs    Common Labsle 1/13/22 1/13/22 1/13/22 2/24/22 2/24/22 2/24/22 2/24/22 6/17/22 6/17/22 6/17/22 6/17/22    0840 0840 0840 1143 1143 1143 1143 1153 1153 1153 1153   Glucose      146 (A)   248 (A)     Glucose  225 (A)            BUN  26    22   27     Creatinine  1.39 (A)    1.38 (A)   1.48 (A)     eGFR Non  Am      53 (A)        eGFR  Am      62        Sodium  139    143   140     Potassium  4.4    4.8   4.5     Chloride  101    103   102     Calcium  9.4    10.0   9.3     Total Protein      7.4        Albumin  4.1    4.5        Total Bilirubin  0.6    0.5        Alkaline Phosphatase  80    75        AST (SGOT)  33    29        ALT (SGPT)  34    26        WBC 8.36             Hemoglobin 12.6 (A)             Hematocrit 39.8 (A)             Platelets 239             Total Cholesterol     148     130    Triglycerides     202 (A)     316 (A)    HDL Cholesterol     40     32 (A)    LDL Cholesterol      74     50    Hemoglobin A1C       9.8 (A) 9.6 (A)      Microalbumin, Urine    16.4       9.1   PSA   0.18           (A) Abnormal value       Comments are available for some flowsheets but are not being displayed.                     Assessment and Plan   Diagnoses and all orders for this visit:    1. Type 2 diabetes mellitus with hyperglycemia, with long-term current use of insulin (HCC) (Primary)  -     Ambulatory Referral to Diabetic Education  -     Hemoglobin A1c; Future  -     Comprehensive Metabolic Panel; Future  -     Lipid Panel; Future  -     Microalbumin / Creatinine Urine Ratio - Urine, Clean Catch; Future    2. Mixed hyperlipidemia  -     Hemoglobin A1c; Future  -     Comprehensive Metabolic Panel; Future  -     Lipid Panel; Future  -     Microalbumin / Creatinine Urine Ratio - Urine, Clean Catch; Future    Other orders  -     Insulin Degludec (Tresiba FlexTouch) 200  UNIT/ML solution pen-injector pen injection; Inject 130 Units under the skin into the appropriate area as directed Daily.  Dispense: 54 mL; Refill: 3             Follow Up   No follow-ups on file.     a1c uncontrolled   Diabetes education team to place professional sensor for review, then will add glp-1 after review ( no copay per pharmacy)   Patient is frustrated as he feels when he checks his blood sugar he sees really good numbers  He is trying to follow a healthy diet and stays very active at the gym  Continue statin and vascepa   Improve low cholesterol and triglyceride diet-triglycerides above target range  Continue current T4 dose, TSH level stable    Patient was given instructions and counseling regarding his condition or for health maintenance advice. Please see specific information pulled into the AVS if appropriate.     JIM Courtney

## 2022-06-28 DIAGNOSIS — I10 BENIGN ESSENTIAL HYPERTENSION: ICD-10-CM

## 2022-06-28 RX ORDER — AMLODIPINE BESYLATE 5 MG/1
TABLET ORAL
Qty: 180 TABLET | Refills: 3 | Status: SHIPPED | OUTPATIENT
Start: 2022-06-28

## 2022-06-29 ENCOUNTER — TELEPHONE (OUTPATIENT)
Dept: ENDOCRINOLOGY | Age: 67
End: 2022-06-29

## 2022-07-19 RX ORDER — LEVOTHYROXINE SODIUM 125 MCG
TABLET ORAL
Qty: 90 TABLET | Refills: 0 | Status: SHIPPED | OUTPATIENT
Start: 2022-07-19 | End: 2022-10-14

## 2022-07-19 RX ORDER — FENOFIBRATE 145 MG/1
TABLET, COATED ORAL
Qty: 90 TABLET | Refills: 1 | Status: SHIPPED | OUTPATIENT
Start: 2022-07-19 | End: 2023-01-11

## 2022-07-19 NOTE — TELEPHONE ENCOUNTER
Rx Refill Note  Requested Prescriptions     Pending Prescriptions Disp Refills    fenofibrate (TRICOR) 145 MG tablet [Pharmacy Med Name: FENOFIBRATE 145 MG TABS 145 Tablet] 90 tablet 1     Sig: TAKE 1 TABLET BY MOUTH DAILY.      Last office visit with prescribing clinician: 6/23/2022      Next office visit with prescribing clinician: 7/17/2022            FABY DUMONT MA  07/19/22, 11:48 EDT

## 2022-07-19 NOTE — TELEPHONE ENCOUNTER
Rx Refill Note  Requested Prescriptions     Pending Prescriptions Disp Refills    Synthroid 125 MCG tablet [Pharmacy Med Name: SYNTHROID 125 MCG TABS 125 Tablet] 90 tablet 0     Sig: TAKE 1 TABLET BY MOUTH ONCE DAILY.      Last office visit with prescribing clinician: 6/23/2022      Next office visit with prescribing clinician: Visit date not found            FABY DUMONT MA  07/19/22, 11:48 EDT

## 2022-08-03 ENCOUNTER — SPECIALTY PHARMACY (OUTPATIENT)
Dept: ENDOCRINOLOGY | Age: 67
End: 2022-08-03

## 2022-08-03 NOTE — PROGRESS NOTES
Specialty Pharmacy Refill Coordination Note     Kem is a 66 y.o. male contacted today regarding refills of  1 specialty medication(s).    Reviewed and verified with patient:       Specialty medication(s) and dose(s) confirmed: yes    Refill Questions    Flowsheet Row Most Recent Value   Changes to allergies? No   Changes to medications? No   New conditions since last clinic visit No   Unplanned office visit, urgent care, ED, or hospital admission in the last 4 weeks  No   How does patient/caregiver feel medication is working? Very good   Financial problems or insurance changes  No   Since the previous refill, were any specialty medication doses or scheduled injections missed or delayed?  No   Does this patient require a clinical escalation to a pharmacist? No          Delivery Questions    Flowsheet Row Most Recent Value   Delivery method FedEx   Delivery address correct? Yes   Preferred delivery time? Anytime   Number of medications in delivery 1   Medication being filled and delivered TRESIBA   Doses left of specialty medications 1 WEEK   Is there any medication that is due not being filled? No   Supplies needed? No supplies needed   Cooler needed? Yes   Do any medications need mixed or dated? No   Additional comments 0   Questions or concerns for the pharmacist? No   Explain any questions or concerns for the pharmacist N/A   Are any medications first time fills? No   Shipment status Cooler packed                 Follow-up: 83 day(s)     JOHN SALINAS  Specialty Pharmacy Technician

## 2022-09-13 RX ORDER — ATORVASTATIN CALCIUM 80 MG/1
TABLET, FILM COATED ORAL
Qty: 90 TABLET | Refills: 1 | Status: SHIPPED | OUTPATIENT
Start: 2022-09-13 | End: 2023-03-10 | Stop reason: SDUPTHER

## 2022-09-30 RX ORDER — APIXABAN 5 MG/1
TABLET, FILM COATED ORAL
Qty: 180 TABLET | Refills: 2 | Status: SHIPPED | OUTPATIENT
Start: 2022-09-30

## 2022-10-12 DIAGNOSIS — E78.1 FAMILIAL HYPERTRIGLYCERIDEMIA: ICD-10-CM

## 2022-10-13 RX ORDER — ICOSAPENT ETHYL 1000 MG/1
2 CAPSULE ORAL 2 TIMES DAILY WITH MEALS
Qty: 360 CAPSULE | Refills: 0 | Status: SHIPPED | OUTPATIENT
Start: 2022-10-13 | End: 2023-01-09

## 2022-10-14 RX ORDER — LEVOTHYROXINE SODIUM 125 MCG
TABLET ORAL
Qty: 90 TABLET | Refills: 0 | Status: SHIPPED | OUTPATIENT
Start: 2022-10-14 | End: 2023-01-24

## 2022-10-19 ENCOUNTER — SPECIALTY PHARMACY (OUTPATIENT)
Dept: ENDOCRINOLOGY | Age: 67
End: 2022-10-19

## 2022-10-19 NOTE — PROGRESS NOTES
Specialty Pharmacy Refill Coordination Note     Kem is a 66 y.o. male contacted today regarding refills of his specialty medication(s).    Specialty medication(s) and dose(s) confirmed: Yes (tresiba)  Changes to medications: no  Changes to insurance: no  Reviewed and verified with patient:         Refill Questions    Flowsheet Row Most Recent Value   Changes to allergies? No   Changes to medications? No   New conditions since last clinic visit No   Unplanned office visit, urgent care, ED, or hospital admission in the last 4 weeks  No   How does patient/caregiver feel medication is working? Very good   Financial problems or insurance changes  No   Since the previous refill, were any specialty medication doses or scheduled injections missed or delayed?  No   Does this patient require a clinical escalation to a pharmacist? No          Delivery Questions    Flowsheet Row Most Recent Value   Delivery method [employer]Ex   Delivery address correct? Yes   Delivery phone number [phone]   Preferred delivery time? Anytime   Number of medications in delivery 1   Medication being filled and delivered tresiba   Is there any medication that is due not being filled? No   Supplies needed? No supplies needed   Cooler needed? Yes   Do any medications need mixed or dated? No   Copay form of payment Payment plan already set up   Questions or concerns for the pharmacist? No   Are any medications first time fills? No   Shipment status Cooler packed               Follow-up: 83 day(s)     Dasha Christianson, Fátima  10/19/2022   16:38 EDT

## 2022-11-03 ENCOUNTER — OFFICE VISIT (OUTPATIENT)
Dept: ENDOCRINOLOGY | Age: 67
End: 2022-11-03

## 2022-11-03 VITALS
TEMPERATURE: 97.3 F | DIASTOLIC BLOOD PRESSURE: 70 MMHG | HEIGHT: 75 IN | HEART RATE: 62 BPM | BODY MASS INDEX: 33.85 KG/M2 | WEIGHT: 272.2 LBS | SYSTOLIC BLOOD PRESSURE: 130 MMHG | OXYGEN SATURATION: 97 %

## 2022-11-03 DIAGNOSIS — E06.3 HYPOTHYROIDISM DUE TO HASHIMOTO'S THYROIDITIS: ICD-10-CM

## 2022-11-03 DIAGNOSIS — Z79.4 TYPE 2 DIABETES MELLITUS WITH HYPERGLYCEMIA, WITH LONG-TERM CURRENT USE OF INSULIN: Primary | ICD-10-CM

## 2022-11-03 DIAGNOSIS — E55.9 VITAMIN D DEFICIENCY: ICD-10-CM

## 2022-11-03 DIAGNOSIS — E11.65 TYPE 2 DIABETES MELLITUS WITH HYPERGLYCEMIA, WITH LONG-TERM CURRENT USE OF INSULIN: Primary | ICD-10-CM

## 2022-11-03 DIAGNOSIS — E03.8 HYPOTHYROIDISM DUE TO HASHIMOTO'S THYROIDITIS: ICD-10-CM

## 2022-11-03 DIAGNOSIS — E78.2 MIXED HYPERLIPIDEMIA: ICD-10-CM

## 2022-11-03 PROCEDURE — 99214 OFFICE O/P EST MOD 30 MIN: CPT | Performed by: INTERNAL MEDICINE

## 2022-11-03 NOTE — PROGRESS NOTES
"Chief Complaint  Chief Complaint   Patient presents with   • Diabetes     Type2: Patient doesn't have meter with him today but states that he checks his bs once daily no abnormalities to report he has no hx of retinopathy, neuropathy, kidney disease or stroke. The patient does state however that he does have hx of heart disease       Subjective     {CC  Problem List  Visit Diagnosis   Encounters  Notes  Medications  Labs  Result Review Imaging  Media :23}     History of Present Illness    Kem ELLIS [place] 66 y.o. presents with Type 2 dm as a f/u patient.     Type 2 dm   Diagnosed about 12 years ago.   Today in clinic pt reports being on Tresiba 130u in the morning, farxiga 10 mg po daily  BS fasting <120  Reports all BS are 120s  Dm retinopathy -x, Last eye exam - still due for exam  Dm nephropathy - CKD stage 3, sees renal   Dm neuropathy - x  CAD -x  CVA -x  Episodes of hypoglycemia - no  Pt is physically active at the gym  On statin and vascepa     #Was not on GLP-1 analogs.  Due to the cost    Reviewed primary care physician's/consulting physician documentation and lab results         I have reviewed the patient's allergies, medicines, past medical hx, family hx and social hx in detail.    Objective   Vital Signs:   /70   Pulse 62   Temp 97.3 °F (36.3 °C) (Temporal)   Ht 190.5 cm (75\")   Wt 123 kg (272 lb 3.2 oz)   SpO2 97%   BMI 34.02 kg/m²   Physical Exam   General appearance - no distress  Eyes- anicteric sclera  Ear nose and throat-external ears and nose normal.    Respiratory-normal chest on inspection.  No respiratory distress noted.  Skin-no rashes.  Neuro-alert and oriented x3          Result Review :   The following data was reviewed by: Cong Alvarenga MD on 11/03/2022:  Results Encounter on 06/08/2022   Component Date Value Ref Range Status   • TSH 06/17/2022 2.290  0.450 - 4.500 uIU/mL Final   • Free T4 06/17/2022 1.16  0.82 - 1.77 ng/dL Final   • T3, Free 06/17/2022 2.3  2.0 - " 4.4 pg/mL Final   • Hemoglobin A1C 06/17/2022 9.6 (H)  4.8 - 5.6 % Final    Comment:          Prediabetes: 5.7 - 6.4           Diabetes: >6.4           Glycemic control for adults with diabetes: <7.0     • Glucose 06/17/2022 248 (H)  65 - 99 mg/dL Final   • BUN 06/17/2022 27  8 - 27 mg/dL Final   • Creatinine 06/17/2022 1.48 (H)  0.76 - 1.27 mg/dL Final   • EGFR Result 06/17/2022 52 (L)  >59 mL/min/1.73 Final   • BUN/Creatinine Ratio 06/17/2022 18  10 - 24 Final   • Sodium 06/17/2022 140  134 - 144 mmol/L Final   • Potassium 06/17/2022 4.5  3.5 - 5.2 mmol/L Final   • Chloride 06/17/2022 102  96 - 106 mmol/L Final   • Total CO2 06/17/2022 21  20 - 29 mmol/L Final   • Calcium 06/17/2022 9.3  8.6 - 10.2 mg/dL Final   • Total Cholesterol 06/17/2022 130  100 - 199 mg/dL Final   • Triglycerides 06/17/2022 316 (H)  0 - 149 mg/dL Final   • HDL Cholesterol 06/17/2022 32 (L)  >39 mg/dL Final   • VLDL Cholesterol Lito 06/17/2022 48 (H)  5 - 40 mg/dL Final   • LDL Chol Calc (NIH) 06/17/2022 50  0 - 99 mg/dL Final   • Creatinine, Urine 06/17/2022 54.4  Not Estab. mg/dL Final   • Microalbumin, Urine 06/17/2022 9.1  Not Estab. ug/mL Final   • Microalbumin/Creatinine Ratio 06/17/2022 17  0 - 29 mg/g creat Final    Comment:                        Normal:                0 -  29                         Moderately increased: 30 - 300                         Severely increased:       >300     • Interpretation 06/17/2022 Note   Final    Supplemental report is available.   • Interpretation 06/17/2022 Note   Final    Comment: Medical Director's Note: Effective 2/28/2022, [facility] uses  the 2021 CKD-EPI creatinine equation without a race factor  to calculate and report eGFR results. eGFR results reported  prior to this date using the 2009 CKD-EPI equation are no  longer included in this report interpretation.  -------------------------------  CHRONIC KIDNEY DISEASE:  EGFR, BLOOD PRESSURE, AND PROTEINURIA ASSESSMENT  We presume eGFR has been  less than 60 mL/min/1.73mE2 on at  least two occasions spaced at least 3 months apart. Current  eGFR is 52 mL/min/1.73mE2 corresponding to CKD stage 3a.  Potassium is within goal and has not changed significantly,  was 4.8 and now is 4.5 mmol/L. Albumin:Creatinine ratio is  not elevated and has decreased, was 24.2 and now is 16.7  mg/g creat. If no other markers of kidney damage are present  consider measurement of cystatin C to confirm diagnosis of  CKD. Glycemic control (HB A1c: 9.6 %) is not within goal and  additional action is indicated.  EGFR, BLOOD PRESSURE, AND PRO                           TEINURIA TREATMENT SUGGESTIONS  -  Based on current eGFR and absence of significant  proteinuria, patient is at moderately increased risk for  adverse outcomes such as CKD progression, CVD, and  mortality. Guidelines recommend a target blood pressure of  120/80 mmHg or less in CKD patients to reduce cardiovascular  risk and CKD progression.  EGFR, BLOOD PRESSURE, AND PROTEINURIA FOLLOW-UP  -  Spot Urine Panel (Albumin preferred) within 12 months;  Hemoglobin A1C and fasting Renal Panel within 3 months;  BONE and MINERAL ASSESSMENT  Calcium is within goal and has decreased, was 10.0 and now  is 9.3 mg/dL. Carbon Dioxide is below goal and has  decreased, was 24 and now is 21 mmol/L. Guidelines recommend  the measurement of 25-hydroxy vitamin D in patients with  CKD.  BONE and MINERAL TREATMENT SUGGESTIONS  -  Interpretations require simultaneous measurements of serum  calcium and phosphorus. If not on alkali, begin sodium  bicarbonate, one 650 mg pill 2-3 times daily, otherwise  increase                            dose.  BONE and MINERAL FOLLOW-UP  -  fasting Renal Panel within 3 months; fasting PTH with Renal  Panel is recommended by guidelines, at least yearly;  25-Hydroxy Vitamin D is due;  LIPIDS ASSESSMENT  LDL-C is optimal and has decreased, was 74 and now is 50  mg/dL. Triglyceride is high and has risen, was 202 and  now  is 316 mg/dL. Non-HDL Cholesterol is optimal and has  decreased, was 108 and now is 98 mg/dL. HDL-C is low and has  decreased, was 40 and now is 32 mg/dL.  LIPIDS TREATMENT SUGGESTIONS  -  Therapeutic lifestyle changes are always valuable to  maintain optimal blood lipid status (diet, exercise, weight  management). Continue statin if in use. Consider measurement  of LDL particle number or Apo B to adjudicate need for  further LDL lowering therapy. If statin cannot be tolerated  or increased, alternatives include use of an intestinal  agent (ezetimibe or bile acid sequestrant), niacin, and/or  fish oil.  LIPIDS FOLLOW-UP  -  fasting Lipid Panel within 12 months;  ANEMIA A                           SSESSMENT  Most recent order does not include a CBC Panel or iron  studies.  ANEMIA FOLLOW-UP  -  CBC within 2 weeks;  -------------------------------  DISCLAIMER  These assessments and treatment suggestions are provided as  a convenience in support of the physician-patient  relationship and are not intended to replace the physician's  clinical judgment. They are derived from national guidelines  in addition to other evidence and expert opinion. The  clinician should consider this information within the  context of clinical opinion and the individual patient.  SEE GUIDANCE FOR CHRONIC KIDNEY DISEASE PROGRAM: Kidney  Disease Improving Global Outcomes (KDIGO) clinical practice  guidelines are at http://kdigo.org/home/guidelines/.  National Kid[employer] Kidney Disease Outcomes Quality  Initiative (KDOQI (TM)), with its limitations and  disclaimers, are at www.kidney.org/professionals/KDOQI. This  program is intended for patients who have been diagnosed  with stages 3, 4, or pre-dialy                           sis 5 CKD. It is not intended  for children, pregnant patients, or transplant patients.       Data reviewed: PCP and prior endocrine notes       Results Review:    I reviewed the patient's new clinical  "results.     Assessment and Plan    Problem List Items Addressed This Visit        Other    Hypothyroidism    Relevant Orders    TSH    T4, Free    Basic Metabolic Panel    Hemoglobin A1c    Lipid Panel    Vitamin B12 & Folate    Vitamin D,25-Hydroxy    Mixed hyperlipidemia    Relevant Orders    TSH    T4, Free    Basic Metabolic Panel    Hemoglobin A1c    Lipid Panel    Vitamin B12 & Folate    Vitamin D,25-Hydroxy    Vitamin D deficiency    Relevant Orders    Vitamin D,25-Hydroxy   Other Visit Diagnoses     Type 2 diabetes mellitus with hyperglycemia, with long-term current use of insulin (HCC)    -  Primary    Relevant Orders    TSH    T4, Free    Basic Metabolic Panel    Hemoglobin A1c    Lipid Panel    Vitamin B12 & Folate    Vitamin D,25-Hydroxy        Type 2 diabetes mellitus-uncontrolled with hyperglycemia  Suspect that the HbA1c will still be high and not at goal.  Would consider either splitting the dosage of the long-acting insulin and changing it to Toujeo or adding a GLP-1 analog.  We will make this decision based on the blood work-up  Continue Farxiga.    Hyperlipidemia  Continue statin.    Hypothyroidism  Continue Synthroid 125 mcg oral daily  Interpreted the blood work-up/imaging results performed by the primary care/consulting physician -    Refills sent to pharmacy    Follow Up     Patient was given instructions and counseling regarding her condition or for health maintenance advice. Please see specific information pulled into the AVS if appropriate.       Thank you for asking me to see your patient, Kem Alvarado in consultation.         Cong Alvarenga MD  11/03/22      EMR Dragon / transcription disclaimer:     \"Dictated utilizing Dragon dictation\".         "

## 2022-11-04 LAB
25(OH)D3+25(OH)D2 SERPL-MCNC: 51.3 NG/ML (ref 30–100)
BUN SERPL-MCNC: 24 MG/DL (ref 8–23)
BUN/CREAT SERPL: 17.5 (ref 7–25)
CALCIUM SERPL-MCNC: 10 MG/DL (ref 8.6–10.5)
CHLORIDE SERPL-SCNC: 101 MMOL/L (ref 98–107)
CHOLEST SERPL-MCNC: 136 MG/DL (ref 0–200)
CO2 SERPL-SCNC: 25 MMOL/L (ref 22–29)
CREAT SERPL-MCNC: 1.37 MG/DL (ref 0.76–1.27)
EGFRCR SERPLBLD CKD-EPI 2021: 57.2 ML/MIN/1.73
FOLATE SERPL-MCNC: 11.6 NG/ML (ref 4.78–24.2)
GLUCOSE SERPL-MCNC: 162 MG/DL (ref 65–99)
HBA1C MFR BLD: 8.3 % (ref 4.8–5.6)
HDLC SERPL-MCNC: 37 MG/DL (ref 40–60)
IMP & REVIEW OF LAB RESULTS: NORMAL
LDLC SERPL CALC-MCNC: 65 MG/DL (ref 0–100)
POTASSIUM SERPL-SCNC: 5.1 MMOL/L (ref 3.5–5.2)
SODIUM SERPL-SCNC: 139 MMOL/L (ref 136–145)
T4 FREE SERPL-MCNC: 1.27 NG/DL (ref 0.93–1.7)
TRIGL SERPL-MCNC: 205 MG/DL (ref 0–150)
TSH SERPL DL<=0.005 MIU/L-ACNC: 2.53 UIU/ML (ref 0.27–4.2)
VIT B12 SERPL-MCNC: 482 PG/ML (ref 211–946)
VLDLC SERPL CALC-MCNC: 34 MG/DL (ref 5–40)

## 2022-11-04 RX ORDER — DULAGLUTIDE 1.5 MG/.5ML
1.5 INJECTION, SOLUTION SUBCUTANEOUS WEEKLY
Qty: 2 ML | Refills: 11 | Status: SHIPPED | OUTPATIENT
Start: 2022-11-04 | End: 2023-03-10

## 2022-12-16 ENCOUNTER — SPECIALTY PHARMACY (OUTPATIENT)
Dept: ENDOCRINOLOGY | Age: 67
End: 2022-12-16

## 2022-12-16 NOTE — PROGRESS NOTES
Specialty Pharmacy Patient Management Program  Endocrinology Reassessment     Kem Alvarado is a 66 y.o. male seen by an Endocrinology provider for Type 2 Diabetes and enrolled in the Endocrinology Patient Management program offered by [facility].  A follow-up outreach was conducted, including assessment of continued therapy appropriateness, medication adherence, and side effect incidence and management for Tresiba.    Changes to Insurance Coverage or Financial Support  No changes -  [employer] [place]/[employer] Part D    Relevant Past Medical History and Comorbidities  Relevant medical history and concomitant health conditions were discussed with the patient. The patient's chart has been reviewed for relevant past medical history and comorbid health conditions and updated as necessary.   Past Medical History:   Diagnosis Date   • ADHD (attention deficit hyperactivity disorder)    • Atrial flutter (HCC)    • Benign essential hypertension    • Benign polyp of large intestine    • Diabetes mellitus (HCC)    • Hyperlipidemia    • Hypoglycemia    • Pancreatitis    • Testosterone deficiency    • Type 2 diabetes mellitus (HCC)    • Vitamin D deficiency      Social History     Socioeconomic History   • Marital status:    Tobacco Use   • Smoking status: Former   • Smokeless tobacco: Never   • Tobacco comments:     caffeine use   Substance and Sexual Activity   • Alcohol use: Yes     Comment: occassional   • Drug use: No   • Sexual activity: Yes     Partners: Female     Birth control/protection: None       Problem list reviewed by Maria Esther Slade, PharmDEION on 12/16/2022 at 12:28 PM    Allergies  Known allergies and reactions were discussed with the patient. The patient's chart has been reviewed for allergy information and updated as necessary.   Synjardy [empagliflozin-metformin hcl]    Allergies reviewed by Maria Esther Slade, PharmDEION on 12/16/2022 at 12:28 PM    Relevant Laboratory  Values  A1C Last 3 Results    HGBA1C Last 3 Results 2/24/22 6/17/22 11/3/22   Hemoglobin A1C 9.8 (A) 9.6 (A) 8.30 (A)   (A) Abnormal value       Comments are available for some flowsheets but are not being displayed.           Lab Results   Component Value Date    HGBA1C 8.30 (H) 11/03/2022     Lab Results   Component Value Date    GLUCOSE 162 (H) 11/03/2022    CALCIUM 10.0 11/03/2022     11/03/2022    K 5.1 11/03/2022    CO2 25.0 11/03/2022     11/03/2022    BUN 24 (H) 11/03/2022    CREATININE 1.37 (H) 11/03/2022    EGFRIFAFRI 62 02/24/2022    EGFRIFNONA 53 (L) 02/24/2022    BCR 17.5 11/03/2022     Lab Results   Component Value Date    CHLPL 136 11/03/2022    TRIG 205 (H) 11/03/2022    HDL 37 (L) 11/03/2022    LDL 65 11/03/2022     Microalbumin    Microalbumin 2/24/22 6/17/22   Microalbumin, Urine 16.4 9.1           Current Medication List  This medication list has been reviewed with the patient and evaluated for any interactions or necessary modifications/recommendations, and updated to include all prescription medications, OTC medications, and supplements the patient is currently taking.  This list reflects what is contained in the patient's profile, which has also been marked as reviewed to communicate to other providers it is the most up to date version of the patient's current medication therapy.     Current Outpatient Medications:   •  Accu-Chek Melida Plus test strip, Dx code E11.9 CHECK BLOOD SUGAR 5 TIMES DAILY, Disp: 500 each, Rfl: 3  •  allopurinol (ZYLOPRIM) 100 MG tablet, Take 1 tablet by mouth Daily., Disp: 90 tablet, Rfl: 3  •  amLODIPine (NORVASC) 5 MG tablet, TAKE 1 TABLET BY MOUTH TWICE DAILY, Disp: 180 tablet, Rfl: 3  •  atorvastatin (LIPITOR) 80 MG tablet, TAKE 1 TABLET BY MOUTH DAILY., Disp: 90 tablet, Rfl: 1  •  Blood Glucose Monitoring Suppl (OneTouch Verio Flex System) w/Device kit, 1 Device 5 (Five) Times a Day. e11.8, Disp: 1 kit, Rfl: 0  •  Dapagliflozin Propanediol (Farxiga)  10 MG tablet, Take 10 mg by mouth Daily., Disp: 30 tablet, Rfl: 11  •  Dulaglutide (Trulicity) 1.5 MG/0.5ML solution pen-injector, Inject 1.5 mg under the skin into the appropriate area as directed 1 (One) Time Per Week., Disp: 2 mL, Rfl: 11  •  Eliquis 5 MG tablet tablet, TAKE 1 TABLET BY MOUTH 2 (TWO) TIMES A DAY., Disp: 180 tablet, Rfl: 2  •  fenofibrate (TRICOR) 145 MG tablet, TAKE 1 TABLET BY MOUTH DAILY., Disp: 90 tablet, Rfl: 1  •  glucose blood (OneTouch Verio) test strip, Use to test blood sugar 5 times daily E11.8, Disp: 500 each, Rfl: 1  •  Insulin Degludec (Tresiba FlexTouch) 200 UNIT/ML solution pen-injector pen injection, Inject 130 Units under the skin into the appropriate area as directed Daily., Disp: 54 mL, Rfl: 3  •  Insulin Syringes, Disposable, U-100 1 ML misc, Use to inject insulin, Disp: 200 each, Rfl: 0  •  metoprolol tartrate (LOPRESSOR) 50 MG tablet, TAKE ONE TABLET BY MOUTH TWICE A DAY, Disp: 60 tablet, Rfl: 0  •  OneTouch Delica Lancets 33G misc, Use to test bloddsugar 5 times daily E11.8, Disp: 500 each, Rfl: 1  •  Synthroid 125 MCG tablet, TAKE 1 TABLET BY MOUTH ONCE DAILY., Disp: 90 tablet, Rfl: 0  •  Vascepa 1 g capsule capsule, Take 2 g by mouth 2 (Two) Times a Day With Meals for 90 days., Disp: 360 capsule, Rfl: 0  •  vitamin D (ERGOCALCIFEROL) 1.25 MG (15473 UT) capsule capsule, TAKE 1 CAPSULE BY MOUTH 2 (TWO) TIMES A WEEK., Disp: 36 capsule, Rfl: 1    Medicines reviewed by Maria Esther Slade, PharmD on 12/16/2022 at 12:28 PM    Drug Interactions  None    Recommended Medications Assessment  • Aspirin - Not Indicated  • Statin - Currently Taking   • ACEi/ARB - Not Indicated    Adverse Drug Reactions  • Adverse Reactions Experienced: None   • Plan for ADR Management: Not required    Hospitalizations and Urgent Care Since Last Assessment  • Hospitalizations or Admissions: None   • ED Visits: None  • Urgent Office Visits: None     Adherence, Self-Administration, and Current Therapy  Problems  Adherence related patient's specialty therapy was discussed with the patient. The Adherence segment of this outreach has been reviewed and updated.     Adherence Questions  Medication(s) assessed: Tresiba  On average, how many doses/injections does the patient miss per month?: 0  What are the identified reasons for non-adherence or missed doses? : no problems identfied  What is the estimated medication adherence level?: %  Based on the patient/caregiver response and refill history, does this patient require an MTP to track adherence improvements?: no  • Ongoing or New Barriers to Patient Self-Administration: None  • Methods for Supporting Patient Self-Administration: Not required     Medication Therapy Recommendations  No medication therapy recommendations to display     Goals of Therapy  Goals related to the patient's specialty therapy was discussed with the patient. The Patient Goals segment of this outreach has been reviewed and updated.    Goals     • Specialty Pharmacy General Goal      Reduce HbA1c < 8%  • 11/03/22 = 8.3%  • 02/24/22 = 9.8%  • 11/04/21 = 7.6%      • Specialty Pharmacy General Goal      Consistently take medications as prescribed             Quality of Life Assessment   Quality of Life related to the patient's specialty therapy was discussed with the patient. The QOL segment of this outreach has been reviewed and updated.     Quality of Life Assessment  Quality of Life Improvement Scale: A little better    Reassessment Plan & Follow-Up  1. Medication Therapy Changes: Provider added Trulicity 1.5 mg weekly (dispensed at outside pharmacy)   2. Additional Plans, Therapy Recommendations, or Therapy Problems to Be Addressed: N/A   3. Pharmacist to perform regular reassessments no more than (6) months from the previous assessment.  4. Care Coordinator to set up future refill outreaches, coordinate prescription delivery, and escalate clinical questions to pharmacist.     Attestation  I  attest that the specialty medication(s) addressed above are appropriate for the patient based on my reassessment.  If the prescribed therapy is at any point deemed not appropriate based on the current or future assessments, a consultation will be initiated with the patient's specialty care provider to determine the best course of action. The revised plan of therapy will be documented along with any additional patient education provided.     Maria Esther Slade, Fátima, BCCP, BCPS   Clinical Specialty Pharmacist, Endocrinology  12/16/2022  12:36 EST

## 2023-01-03 RX ORDER — DAPAGLIFLOZIN 10 MG/1
TABLET, FILM COATED ORAL
Qty: 90 TABLET | Refills: 1 | Status: SHIPPED | OUTPATIENT
Start: 2023-01-03 | End: 2023-03-10 | Stop reason: SDUPTHER

## 2023-01-04 ENCOUNTER — SPECIALTY PHARMACY (OUTPATIENT)
Dept: ENDOCRINOLOGY | Age: 68
End: 2023-01-04
Payer: MEDICARE

## 2023-01-04 NOTE — PROGRESS NOTES
Specialty Pharmacy Refill Coordination Note     Kem is a 67 y.o. male contacted today regarding refills of  1 specialty medication(s).    Reviewed and verified with patient:       Specialty medication(s) and dose(s) confirmed: yes    Refill Questions    Flowsheet Row Most Recent Value   Changes to allergies? No   Changes to medications? No   New conditions since last clinic visit No   Unplanned office visit, urgent care, ED, or hospital admission in the last 4 weeks  No   How does patient/caregiver feel medication is working? Very good   Financial problems or insurance changes  No   Since the previous refill, were any specialty medication doses or scheduled injections missed or delayed?  No   Does this patient require a clinical escalation to a pharmacist? No          Delivery Questions    Flowsheet Row Most Recent Value   Delivery method FedEx   Delivery address correct? Yes   Preferred delivery time? Anytime   Number of medications in delivery 1   Medication being filled and delivered tresiba 200   Doses left of specialty medications 1 week   Is there any medication that is due not being filled? No   Supplies needed? No supplies needed   Cooler needed? Yes   Do any medications need mixed or dated? No   Additional comments 0   Questions or concerns for the pharmacist? No   Explain any questions or concerns for the pharmacist n/a   Are any medications first time fills? No   Shipment status Cooler packed                 Follow-up: 75 day(s)     Kary Mitchell  Specialty Pharmacy Technician

## 2023-01-09 DIAGNOSIS — E78.1 FAMILIAL HYPERTRIGLYCERIDEMIA: ICD-10-CM

## 2023-01-09 RX ORDER — ICOSAPENT ETHYL 1000 MG/1
CAPSULE ORAL
Qty: 360 CAPSULE | Refills: 0 | Status: SHIPPED | OUTPATIENT
Start: 2023-01-09 | End: 2023-03-10 | Stop reason: SDUPTHER

## 2023-01-11 RX ORDER — FENOFIBRATE 145 MG/1
TABLET, COATED ORAL
Qty: 90 TABLET | Refills: 1 | Status: SHIPPED | OUTPATIENT
Start: 2023-01-11 | End: 2023-03-10 | Stop reason: SDUPTHER

## 2023-01-24 RX ORDER — LEVOTHYROXINE SODIUM 125 MCG
TABLET ORAL
Qty: 90 TABLET | Refills: 0 | Status: SHIPPED | OUTPATIENT
Start: 2023-01-24 | End: 2023-03-10 | Stop reason: SDUPTHER

## 2023-03-03 ENCOUNTER — LAB (OUTPATIENT)
Dept: LAB | Facility: HOSPITAL | Age: 68
End: 2023-03-03
Payer: MEDICARE

## 2023-03-03 DIAGNOSIS — I10 HYPERTENSION, UNSPECIFIED TYPE: ICD-10-CM

## 2023-03-03 DIAGNOSIS — E03.9 ACQUIRED HYPOTHYROIDISM: ICD-10-CM

## 2023-03-03 DIAGNOSIS — E29.1 MALE HYPOGONADISM: Primary | ICD-10-CM

## 2023-03-03 DIAGNOSIS — E55.9 VITAMIN D DEFICIENCY: ICD-10-CM

## 2023-03-03 DIAGNOSIS — N18.32 STAGE 3B CHRONIC KIDNEY DISEASE (CKD): ICD-10-CM

## 2023-03-03 DIAGNOSIS — E79.0 HYPERURICEMIA: ICD-10-CM

## 2023-03-03 DIAGNOSIS — E11.21 CONTROLLED TYPE 2 DIABETES MELLITUS WITH DIABETIC NEPHROPATHY, WITHOUT LONG-TERM CURRENT USE OF INSULIN: ICD-10-CM

## 2023-03-03 DIAGNOSIS — Z12.5 SCREENING PSA (PROSTATE SPECIFIC ANTIGEN): ICD-10-CM

## 2023-03-03 LAB
25(OH)D3 SERPL-MCNC: 49.3 NG/ML (ref 30–100)
BASOPHILS # BLD AUTO: 0.07 10*3/MM3 (ref 0–0.2)
BASOPHILS NFR BLD AUTO: 0.9 % (ref 0–1.5)
DEPRECATED RDW RBC AUTO: 42.7 FL (ref 37–54)
EOSINOPHIL # BLD AUTO: 0.23 10*3/MM3 (ref 0–0.4)
EOSINOPHIL NFR BLD AUTO: 2.9 % (ref 0.3–6.2)
ERYTHROCYTE [DISTWIDTH] IN BLOOD BY AUTOMATED COUNT: 14.2 % (ref 12.3–15.4)
HCT VFR BLD AUTO: 42.3 % (ref 37.5–51)
HGB BLD-MCNC: 14 G/DL (ref 13–17.7)
IMM GRANULOCYTES # BLD AUTO: 0.03 10*3/MM3 (ref 0–0.05)
IMM GRANULOCYTES NFR BLD AUTO: 0.4 % (ref 0–0.5)
LYMPHOCYTES # BLD AUTO: 1.35 10*3/MM3 (ref 0.7–3.1)
LYMPHOCYTES NFR BLD AUTO: 17 % (ref 19.6–45.3)
MCH RBC QN AUTO: 27.3 PG (ref 26.6–33)
MCHC RBC AUTO-ENTMCNC: 33.1 G/DL (ref 31.5–35.7)
MCV RBC AUTO: 82.6 FL (ref 79–97)
MONOCYTES # BLD AUTO: 0.66 10*3/MM3 (ref 0.1–0.9)
MONOCYTES NFR BLD AUTO: 8.3 % (ref 5–12)
NEUTROPHILS NFR BLD AUTO: 5.62 10*3/MM3 (ref 1.7–7)
NEUTROPHILS NFR BLD AUTO: 70.5 % (ref 42.7–76)
NRBC BLD AUTO-RTO: 0 /100 WBC (ref 0–0.2)
PLATELET # BLD AUTO: 257 10*3/MM3 (ref 140–450)
PMV BLD AUTO: 9.3 FL (ref 6–12)
PSA SERPL-MCNC: 0.22 NG/ML (ref 0–4)
RBC # BLD AUTO: 5.12 10*6/MM3 (ref 4.14–5.8)
TESTOST SERPL-MCNC: 305 NG/DL (ref 193–740)
URATE SERPL-MCNC: 5.2 MG/DL (ref 3.4–7)
WBC NRBC COR # BLD: 7.96 10*3/MM3 (ref 3.4–10.8)

## 2023-03-03 PROCEDURE — 83036 HEMOGLOBIN GLYCOSYLATED A1C: CPT | Performed by: NURSE PRACTITIONER

## 2023-03-03 PROCEDURE — 84403 ASSAY OF TOTAL TESTOSTERONE: CPT

## 2023-03-03 PROCEDURE — 82607 VITAMIN B-12: CPT | Performed by: INTERNAL MEDICINE

## 2023-03-03 PROCEDURE — 80061 LIPID PANEL: CPT | Performed by: NURSE PRACTITIONER

## 2023-03-03 PROCEDURE — 82570 ASSAY OF URINE CREATININE: CPT | Performed by: NURSE PRACTITIONER

## 2023-03-03 PROCEDURE — 84550 ASSAY OF BLOOD/URIC ACID: CPT

## 2023-03-03 PROCEDURE — 80053 COMPREHEN METABOLIC PANEL: CPT | Performed by: NURSE PRACTITIONER

## 2023-03-03 PROCEDURE — 82746 ASSAY OF FOLIC ACID SERUM: CPT | Performed by: INTERNAL MEDICINE

## 2023-03-03 PROCEDURE — G0103 PSA SCREENING: HCPCS

## 2023-03-03 PROCEDURE — 82043 UR ALBUMIN QUANTITATIVE: CPT | Performed by: NURSE PRACTITIONER

## 2023-03-03 PROCEDURE — 84443 ASSAY THYROID STIM HORMONE: CPT | Performed by: INTERNAL MEDICINE

## 2023-03-03 PROCEDURE — 82306 VITAMIN D 25 HYDROXY: CPT

## 2023-03-03 PROCEDURE — 85025 COMPLETE CBC W/AUTO DIFF WBC: CPT

## 2023-03-03 PROCEDURE — 84439 ASSAY OF FREE THYROXINE: CPT | Performed by: INTERNAL MEDICINE

## 2023-03-10 ENCOUNTER — OFFICE VISIT (OUTPATIENT)
Dept: ENDOCRINOLOGY | Age: 68
End: 2023-03-10
Payer: MEDICARE

## 2023-03-10 VITALS
DIASTOLIC BLOOD PRESSURE: 78 MMHG | SYSTOLIC BLOOD PRESSURE: 140 MMHG | BODY MASS INDEX: 33.62 KG/M2 | WEIGHT: 270.4 LBS | HEART RATE: 69 BPM | TEMPERATURE: 96.9 F | HEIGHT: 75 IN | OXYGEN SATURATION: 95 %

## 2023-03-10 DIAGNOSIS — R79.89 LOW TESTOSTERONE IN MALE: ICD-10-CM

## 2023-03-10 DIAGNOSIS — Z79.4 TYPE 2 DIABETES MELLITUS WITH HYPERGLYCEMIA, WITH LONG-TERM CURRENT USE OF INSULIN: Primary | ICD-10-CM

## 2023-03-10 DIAGNOSIS — E78.1 FAMILIAL HYPERTRIGLYCERIDEMIA: ICD-10-CM

## 2023-03-10 DIAGNOSIS — E11.65 TYPE 2 DIABETES MELLITUS WITH HYPERGLYCEMIA, WITH LONG-TERM CURRENT USE OF INSULIN: Primary | ICD-10-CM

## 2023-03-10 PROCEDURE — 3078F DIAST BP <80 MM HG: CPT | Performed by: NURSE PRACTITIONER

## 2023-03-10 PROCEDURE — 99214 OFFICE O/P EST MOD 30 MIN: CPT | Performed by: NURSE PRACTITIONER

## 2023-03-10 PROCEDURE — 3077F SYST BP >= 140 MM HG: CPT | Performed by: NURSE PRACTITIONER

## 2023-03-10 PROCEDURE — 3052F HG A1C>EQUAL 8.0%<EQUAL 9.0%: CPT | Performed by: NURSE PRACTITIONER

## 2023-03-10 RX ORDER — PEN NEEDLE, DIABETIC 31 G X1/4"
NEEDLE, DISPOSABLE MISCELLANEOUS
COMMUNITY
Start: 2023-02-21

## 2023-03-10 RX ORDER — LEVOTHYROXINE SODIUM 125 MCG
125 TABLET ORAL DAILY
Qty: 90 TABLET | Refills: 1 | Status: SHIPPED | OUTPATIENT
Start: 2023-03-10

## 2023-03-10 RX ORDER — ATORVASTATIN CALCIUM 80 MG/1
80 TABLET, FILM COATED ORAL DAILY
Qty: 90 TABLET | Refills: 1 | Status: SHIPPED | OUTPATIENT
Start: 2023-03-10

## 2023-03-10 RX ORDER — DAPAGLIFLOZIN 10 MG/1
1 TABLET, FILM COATED ORAL DAILY
Qty: 90 TABLET | Refills: 1 | Status: SHIPPED | OUTPATIENT
Start: 2023-03-10

## 2023-03-10 RX ORDER — DULAGLUTIDE 3 MG/.5ML
3 INJECTION, SOLUTION SUBCUTANEOUS WEEKLY
Qty: 6 ML | Refills: 1 | Status: SHIPPED | OUTPATIENT
Start: 2023-03-10

## 2023-03-10 RX ORDER — FENOFIBRATE 145 MG/1
145 TABLET, COATED ORAL DAILY
Qty: 90 TABLET | Refills: 0 | Status: SHIPPED | OUTPATIENT
Start: 2023-03-10

## 2023-03-10 RX ORDER — ICOSAPENT ETHYL 1000 MG/1
2 CAPSULE ORAL 2 TIMES DAILY WITH MEALS
Qty: 360 CAPSULE | Refills: 1 | Status: SHIPPED | OUTPATIENT
Start: 2023-03-10

## 2023-03-10 NOTE — PROGRESS NOTES
"Chief Complaint  Diabetes (Type 2: Pt one touch is attached, is not up to date on eye exam, no hx of retinopathy, mild neuropathy. )    Subjective        Kem Laket presents to [facility]  History of Present Illness     Type 2 dm   Diagnosed about 12 years ago.   Today in clinic pt reports being on Tresiba 130u in the morning, farxiga 10 mg po daily, trulicity 1.5mg weekly   Last eye exam - going in 2 weeks   Dm nephropathy - CKD stage 3, sees renal   Dm neuropathy - yes, seeing podiatrist Monday   CAD - denies   CVA - denies   Episodes of hypoglycemia - no  On statin and vascepa with fenofibrate     Hypothyroid  Synthroid 150mcg with all other medications  Wanting to consider testosterone replacement therapy   Lab Results   Component Value Date    TSH 2.980 03/03/2023         Objective   Vital Signs:  /78   Pulse 69   Temp 96.9 °F (36.1 °C) (Temporal)   Ht 190.5 cm (75\")   Wt 123 kg (270 lb 6.4 oz)   SpO2 95%   BMI 33.80 kg/m²   Estimated body mass index is 33.8 kg/m² as calculated from the following:    Height as of this encounter: 190.5 cm (75\").    Weight as of this encounter: 123 kg (270 lb 6.4 oz).             Physical Exam  Vitals reviewed.   Constitutional:       General: He is not in acute distress.  HENT:      Head: Normocephalic and atraumatic.   Cardiovascular:      Rate and Rhythm: Normal rate.   Pulmonary:      Effort: Pulmonary effort is normal. No respiratory distress.   Musculoskeletal:         General: No signs of injury. Normal range of motion.      Cervical back: Normal range of motion and neck supple.   Skin:     General: Skin is warm and dry.   Neurological:      Mental Status: He is alert and oriented to person, place, and time. Mental status is at baseline.   Psychiatric:         Mood and Affect: Mood normal.         Behavior: Behavior normal.         Thought Content: Thought content normal.         Judgment: Judgment normal.        Result " Review :  The following data was reviewed by: JIM Courtney on 03/10/2023:  Common labs    Common Labs 6/17/22 6/17/22 6/17/22 6/17/22 11/3/22 11/3/22 11/3/22 3/3/23 3/3/23 3/3/23 3/3/23 3/3/23 3/3/23 3/3/23    1153 1153 1153 1153 1132 1132 1132 1029 1029 1029 1029 1029 1029 1029   Glucose  248 (A)   162 (A)       109 (A)     BUN  27   24 (A)       26 (A)     Creatinine  1.48 (A)   1.37 (A)       1.44 (A)     Sodium  140   139       139     Potassium  4.5   5.1       4.5     Chloride  102   101       102     Calcium  9.3   10.0       10.0     Albumin            4.6     Total Bilirubin            0.4     Alkaline Phosphatase            61     AST (SGOT)            32     ALT (SGPT)            24     WBC        7.96         Hemoglobin        14.0         Hematocrit        42.3         Platelets        257         Total Cholesterol             128    Total Cholesterol   130    136          Triglycerides   316 (A)    205 (A)      145    HDL Cholesterol   32 (A)    37 (A)      49    LDL Cholesterol    50    65      54    Hemoglobin A1C 9.6 (A)     8.30 (A)   8.70 (A)        Microalbumin, Urine    9.1      9.8       PSA              0.222   Uric Acid           5.2      (A) Abnormal value       Comments are available for some flowsheets but are not being displayed.                        Assessment and Plan   Diagnoses and all orders for this visit:    1. Type 2 diabetes mellitus with hyperglycemia, with long-term current use of insulin (HCC) (Primary)  -     Dulaglutide (Trulicity) 3 MG/0.5ML solution pen-injector; Inject 0.5 mL under the skin into the appropriate area as directed 1 (One) Time Per Week.  Dispense: 6 mL; Refill: 1  -     TSH; Future  -     T4, Free; Future  -     T3, Free; Future  -     Hemoglobin A1c; Future  -     Lipid Panel; Future  -     TestT+TestF+SHBG; Future  -     Prolactin; Future  -     Hemoglobin & Hematocrit, Blood; Future    2. Low testosterone in male  -     TSH; Future  -     T4,  Free; Future  -     T3, Free; Future  -     Hemoglobin A1c; Future  -     Lipid Panel; Future  -     TestT+TestF+SHBG; Future  -     Prolactin; Future  -     Hemoglobin & Hematocrit, Blood; Future    3. Familial hypertriglyceridemia  -     Vascepa 1 g capsule capsule; Take 2 g by mouth 2 (Two) Times a Day With Meals.  Dispense: 360 capsule; Refill: 1    Other orders  -     Synthroid 125 MCG tablet; Take 1 tablet by mouth Daily.  Dispense: 90 tablet; Refill: 1  -     fenofibrate (TRICOR) 145 MG tablet; Take 1 tablet by mouth Daily.  Dispense: 90 tablet; Refill: 0  -     dapagliflozin Propanediol (Farxiga) 10 MG tablet; Take 10 mg by mouth Daily.  Dispense: 90 tablet; Refill: 1  -     atorvastatin (LIPITOR) 80 MG tablet; Take 1 tablet by mouth Daily.  Dispense: 90 tablet; Refill: 1             Follow Up   Return in about 3 months (around 6/10/2023).     Dr shane 3 months : re low testosterone   Recommend diabetic shoes   a1c above target range, increase trulicity to 3mg weekly   Cholesterol labs favorable on current lipid lowering regimen   Thyroid labs stable, continue current t4 dose     Patient was given instructions and counseling regarding his condition or for health maintenance advice. Please see specific information pulled into the AVS if appropriate.     JIM Courtney

## 2023-03-21 ENCOUNTER — SPECIALTY PHARMACY (OUTPATIENT)
Dept: ENDOCRINOLOGY | Age: 68
End: 2023-03-21
Payer: MEDICARE

## 2023-03-21 NOTE — PROGRESS NOTES
" Specialty Pharmacy Patient Management Program  Endocrinology Refill Outreach      Kem \"Zach\" is a 67 y.o. male contacted today regarding refills of his medication(s).    Specialty medication(s) and dose(s) confirmed: Tresiba 200 units/mL 54mL for 83 days  Other medication(s) being refilled: None    Refill Questions    Flowsheet Row Most Recent Value   Changes to allergies? No   Changes to medications? No   New conditions since last clinic visit No   Unplanned office visit, urgent care, ED, or hospital admission in the last 4 weeks  No   How does patient/caregiver feel medication is working? Very good   Financial problems or insurance changes  No   Since the previous refill, were any specialty medication doses or scheduled injections missed or delayed?  No   Does this patient require a clinical escalation to a pharmacist? No          Delivery Questions    Flowsheet Row Most Recent Value   Delivery method FedEx   Delivery address correct? Yes   Delivery phone number [phone]   Preferred delivery time? Anytime   Number of medications in delivery 1   Medication being filled and delivered Tresiba   Doses left of specialty medications About a week or so   Is there any medication that is due not being filled? No   Supplies needed? No supplies needed   Cooler needed? Yes   Do any medications need mixed or dated? No   Additional comments $0   Questions or concerns for the pharmacist? No   Are any medications first time fills? No                Follow-Up: Will follow up with patient for future refill, will automatically populate as this is endo lite medication.    Minnie Cohen, Fátima, MM   Clinical Speciality Pharmacist, Endocrinology   3/21/2023  15:30 EDT  "

## 2023-06-01 ENCOUNTER — PRIOR AUTHORIZATION (OUTPATIENT)
Dept: ENDOCRINOLOGY | Facility: CLINIC | Age: 68
End: 2023-06-01

## 2023-06-01 NOTE — TELEPHONE ENCOUNTER
6/1/2023    Patient Prior Authorization has been approved on Covermymeds for the Tresiba FlexTouch 200 Units/ML Solution per the patient insurance company it is available without authorization and nothing else is needed at the present time.     (Key: [id number])    Message from Plan  Available without authorization. The member is able to fill the requested drug at the pharmacy. If coverage is still needed or requesting prior to the expiration of a current authorization, a request can be made by sending a fax or calling the number on the back of the member's ID card

## 2023-06-08 ENCOUNTER — SPECIALTY PHARMACY (OUTPATIENT)
Dept: ENDOCRINOLOGY | Age: 68
End: 2023-06-08
Payer: MEDICARE

## 2023-06-08 RX ORDER — INSULIN DEGLUDEC 200 U/ML
130 INJECTION, SOLUTION SUBCUTANEOUS DAILY
Qty: 54 ML | Refills: 3 | Status: SHIPPED | OUTPATIENT
Start: 2023-06-08

## 2023-06-08 NOTE — PROGRESS NOTES
Specialty Pharmacy Patient Management Program  Endocrinology Reassessment     Kem Alvarado is a 67 y.o. male seen by an Endocrinology provider for Type 2 Diabetes and enrolled in the Endocrinology Patient Management program offered by [facility].  A follow-up outreach was conducted, including assessment of continued therapy appropriateness, medication adherence, and side effect incidence and management for Tresiba.    Changes to Insurance Coverage or Financial Support  [facility]    Relevant Past Medical History and Comorbidities  Relevant medical history and concomitant health conditions were discussed with the patient. The patient's chart has been reviewed for relevant past medical history and comorbid health conditions and updated as necessary.   Past Medical History:   Diagnosis Date    ADHD (attention deficit hyperactivity disorder)     Atrial flutter     Benign essential hypertension     Benign polyp of large intestine     Diabetes mellitus     Hyperlipidemia     Hypoglycemia     Pancreatitis     Testosterone deficiency     Type 2 diabetes mellitus     Vitamin D deficiency      Social History     Socioeconomic History    Marital status:    Tobacco Use    Smoking status: Former    Smokeless tobacco: Never    Tobacco comments:     caffeine use   Substance and Sexual Activity    Alcohol use: Yes     Comment: occassional    Drug use: No    Sexual activity: Yes     Partners: Female     Birth control/protection: None          Hospitalizations and Urgent Care Since Last Assessment  ED Visits, Admissions, or Hospitalizations: None  Urgent Office Visits: None    Allergies  Known allergies and reactions were discussed with the patient. The patient's chart has been reviewed for allergy information and updated as necessary.   Allergies   Allergen Reactions    Synjardy [Empagliflozin-Metformin Hcl] Other (See Comments)     CKD     Allergies reviewed by Pam Cohen,  [facility] on 6/8/2023 at 12:19 PM    Relevant Laboratory Values  A1C Last 3 Results          6/17/2022    11:53 11/3/2022    11:32 3/3/2023    10:29   HGBA1C Last 3 Results   Hemoglobin A1C 9.6  8.30  8.70      Lab Results   Component Value Date    HGBA1C 8.70 (H) 03/03/2023     Lab Results   Component Value Date    GLUCOSE 109 (H) 03/03/2023    CALCIUM 10.0 03/03/2023     03/03/2023    K 4.5 03/03/2023    CO2 28.1 03/03/2023     03/03/2023    BUN 26 (H) 03/03/2023    CREATININE 1.44 (H) 03/03/2023    EGFRIFAFRI 62 02/24/2022    EGFRIFNONA 53 (L) 02/24/2022    BCR 18.1 03/03/2023    ANIONGAP 8.9 03/03/2023     Lab Results   Component Value Date    CHOL 128 03/03/2023    CHLPL 136 11/03/2022    TRIG 145 03/03/2023    HDL 49 03/03/2023    LDL 54 03/03/2023     Microalbumin          6/17/2022    11:53 3/3/2023    10:29   Microalbumin   Microalbumin, Urine 9.1  9.8      Current Medication List  This medication list has been reviewed with the patient and evaluated for any interactions or necessary modifications/recommendations, and updated to include all prescription medications, OTC medications, and supplements the patient is currently taking.  This list reflects what is contained in the patient's profile, which has also been marked as reviewed to communicate to other providers it is the most up to date version of the patient's current medication therapy.     Current Outpatient Medications:     Accu-Chek Melida Plus test strip, Dx code E11.9 CHECK BLOOD SUGAR 5 TIMES DAILY, Disp: 500 each, Rfl: 3    allopurinol (ZYLOPRIM) 100 MG tablet, Take 1 tablet by mouth Daily., Disp: 90 tablet, Rfl: 3    amLODIPine (NORVASC) 5 MG tablet, TAKE 1 TABLET BY MOUTH TWICE DAILY, Disp: 180 tablet, Rfl: 3    atorvastatin (LIPITOR) 80 MG tablet, Take 1 tablet by mouth Daily., Disp: 90 tablet, Rfl: 1    Blood Glucose Monitoring Suppl (OneTouch Verio Flex System) w/Device kit, 1 Device 5 (Five) Times a Day. e11.8, Disp: 1 kit, Rfl: 0     dapagliflozin Propanediol (Farxiga) 10 MG tablet, Take 10 mg by mouth Daily., Disp: 90 tablet, Rfl: 1    Dulaglutide (Trulicity) 3 MG/0.5ML solution pen-injector, Inject 0.5 mL under the skin into the appropriate area as directed 1 (One) Time Per Week., Disp: 6 mL, Rfl: 1    Eliquis 5 MG tablet tablet, TAKE 1 TABLET BY MOUTH 2 (TWO) TIMES A DAY., Disp: 180 tablet, Rfl: 2    fenofibrate (TRICOR) 145 MG tablet, Take 1 tablet by mouth Daily., Disp: 90 tablet, Rfl: 0    glucose blood (OneTouch Verio) test strip, Use to test blood sugar 5 times daily E11.8, Disp: 500 each, Rfl: 1    Insulin Degludec (Tresiba FlexTouch) 200 UNIT/ML solution pen-injector pen injection, Inject 130 Units under the skin into the appropriate area as directed Daily., Disp: 54 mL, Rfl: 3    Insulin Syringes, Disposable, U-100 1 ML misc, Use to inject insulin, Disp: 200 each, Rfl: 0    metoprolol tartrate (LOPRESSOR) 50 MG tablet, TAKE ONE TABLET BY MOUTH TWICE A DAY, Disp: 60 tablet, Rfl: 0    OneTouch Delica Lancets 33G misc, Use to test bloddsugar 5 times daily E11.8, Disp: 500 each, Rfl: 1    Synthroid 125 MCG tablet, Take 1 tablet by mouth Daily., Disp: 90 tablet, Rfl: 1    TRUEplus 5-Bevel Pen Needles 31G X 6 MM misc, , Disp: , Rfl:     Vascepa 1 g capsule capsule, Take 2 g by mouth 2 (Two) Times a Day With Meals., Disp: 360 capsule, Rfl: 1    Medicines reviewed by Pam Cohen RP on 6/8/2023 at 12:19 PM    Drug Interactions  Farxiga, Tresiba and Trulicity  Monitor patients for hypoglycemia.    Recommended Medications Assessment  Aspirin: Not Taking Currently  Statin: Currently Taking   ACEi/ARB: Not Taking Currently    Adverse Drug Reactions  Medication tolerability: Tolerating with no to minimal ADRs  Medication plan: Continue therapy with normal follow-up  Plan for ADR Management: None    Adherence, Self-Administration, and Current Therapy Problems  Adherence related to the patient's specialty therapy was discussed with the patient.  The Adherence segment of this outreach has been reviewed and updated.     Adherence Questions  Medication(s) assessed: Tresiba  On average, how many doses/injections does the patient miss per month?: 0  What are the identified reasons for non-adherence or missed doses? : no problems identfied  What is the estimated medication adherence level?: %  Based on the patient/caregiver response and refill history, does this patient require an MTP to track adherence improvements?: no    Additional Barriers to Patient Self-Administration: None identified or disclosed   Methods for Supporting Patient Self-Administration: None     Medication Therapy Recommendations  No medication therapy recommendations to display    Goals of Therapy  Goals related to the patient's specialty therapy were discussed with the patient. The Patient Goals segment of this outreach has been reviewed and updated.   Goals        Specialty Pharmacy General Goal      Reduce HbA1c < 8%    03/03/23 = 8.7%  11/03/22 = 8.3%  02/24/22 = 9.8%  11/04/21 = 7.6%       Specialty Pharmacy General Goal      Consistently take medications as prescribed              Quality of Life Assessment   Quality of Life related to the patient's enrollment in the patient management program and services provided was discussed with the patient. The QOL segment of this outreach has been reviewed and updated.  Quality of Life Improvement Scale: Somewhat better    Reassessment Plan & Follow-Up  1. Medication Therapy Changes: None   2. Related Plans, Therapy Recommendations, or Issues to Be Addressed: None   3. Pharmacist to perform regular assessments no more than (6) months from the previous assessment.  4. Care Coordinator to set up future refill outreaches, coordinate prescription delivery, and escalate clinical questions to pharmacist.    Attestation  Therapeutic appropriateness: Appropriate   If the prescribed therapy is at any point deemed not appropriate based on the current  "or future assessments, a consultation will be initiated with the patient's specialty care provider to determine the best course of action. The revised plan of therapy will be documented along with any required assessments and/or additional patient education provided.     Minnie Cohen PharmD, MCKENZIE  Clinical Specialty Pharmacist, Endocrinology  6/8/2023  13:41 EDT          Specialty Pharmacy Patient Management Program  Endocrinology Refill Outreach      Kem \"Zach\" is a 67 y.o. male contacted today regarding refills of his medication(s).    Specialty medication(s) and dose(s) confirmed: Tresiba 200 units/mL (54mL/83days)  Other medication(s) being refilled: None    Refill Questions      Flowsheet Row Most Recent Value   Changes to allergies? No   Changes to medications? No   New conditions since last clinic visit No   Unplanned office visit, urgent care, ED, or hospital admission in the last 4 weeks  No   How does patient/caregiver feel medication is working? Very good   Financial problems or insurance changes  No   Since the previous refill, were any specialty medication doses or scheduled injections missed or delayed?  No   Does this patient require a clinical escalation to a pharmacist? No          Delivery Questions      Flowsheet Row Most Recent Value   Delivery method Other (Comment)  [Beeline]   Delivery address correct? Yes   Delivery phone number [phone]   Preferred delivery time? Anytime   Number of medications in delivery 1   Medication being filled and delivered Tresiba   Doses left of specialty medications About a week   Is there any medication that is due not being filled? No   Supplies needed? No supplies needed   Cooler needed? No   Do any medications need mixed or dated? No   Additional comments $0   Questions or concerns for the pharmacist? No   Are any medications first time fills? No              Follow-Up: Based on day supply    Minnie Cohen PharmD, MM   Clinical Speciality Pharmacist, " Endocrinology   6/8/2023  13:49 EDT

## 2023-06-19 DIAGNOSIS — I10 BENIGN ESSENTIAL HYPERTENSION: ICD-10-CM

## 2023-06-19 RX ORDER — AMLODIPINE BESYLATE 5 MG/1
TABLET ORAL
Qty: 180 TABLET | Refills: 3 | Status: SHIPPED | OUTPATIENT
Start: 2023-06-19

## 2023-06-26 ENCOUNTER — TELEPHONE (OUTPATIENT)
Dept: ENDOCRINOLOGY | Age: 68
End: 2023-06-26

## 2023-06-26 NOTE — TELEPHONE ENCOUNTER
"Caller: Kem Alvarado \"Zach\"    Relationship to patient: Self    Best call back number: [phone]    Patient is needing: PATIENT RETURNED A CALL HE HAD RECEIVED. NO VOICEMAIL WAS LEFT BUT THE PATIENT HAD RECENTLY DICUSSED WITH HIS PROVIDER [facility] TESTOSTERONE REPLACEMENT BUT WAS TOLD TO WAIT UNTIL LAB RESULTS HAD RETURNED. PLEASE CALL PATIENT TO ADVICE. HE STATED IT IS OKAY TO LEAVE INFORMATION ON HIS VOICEMAIL.           "

## 2023-06-29 ENCOUNTER — TELEPHONE (OUTPATIENT)
Dept: ENDOCRINOLOGY | Age: 68
End: 2023-06-29

## 2023-06-29 NOTE — TELEPHONE ENCOUNTER
Caller: TYLOR    Relationship to patient: SELF    Best call back number: [phone]    Patient is needing: NEEDS TO SCHEDULE LABS DUE TO PA BEING DENIED. PREFERABLY SCHEDULED FOR JULY 6TH 10:30

## 2023-08-25 DIAGNOSIS — Z79.4 TYPE 2 DIABETES MELLITUS WITH HYPERGLYCEMIA, WITH LONG-TERM CURRENT USE OF INSULIN: ICD-10-CM

## 2023-08-25 DIAGNOSIS — E11.65 TYPE 2 DIABETES MELLITUS WITH HYPERGLYCEMIA, WITH LONG-TERM CURRENT USE OF INSULIN: ICD-10-CM

## 2023-08-25 RX ORDER — DULAGLUTIDE 3 MG/.5ML
3 INJECTION, SOLUTION SUBCUTANEOUS WEEKLY
Qty: 6 ML | Refills: 1 | Status: SHIPPED | OUTPATIENT
Start: 2023-08-25 | End: 2023-08-28

## 2023-08-26 DIAGNOSIS — Z79.4 TYPE 2 DIABETES MELLITUS WITH HYPERGLYCEMIA, WITH LONG-TERM CURRENT USE OF INSULIN: ICD-10-CM

## 2023-08-26 DIAGNOSIS — E11.65 TYPE 2 DIABETES MELLITUS WITH HYPERGLYCEMIA, WITH LONG-TERM CURRENT USE OF INSULIN: ICD-10-CM

## 2023-08-28 ENCOUNTER — SPECIALTY PHARMACY (OUTPATIENT)
Dept: ENDOCRINOLOGY | Age: 68
End: 2023-08-28
Payer: MEDICARE

## 2023-08-28 RX ORDER — DULAGLUTIDE 3 MG/.5ML
3 INJECTION, SOLUTION SUBCUTANEOUS WEEKLY
Qty: 6 ML | Refills: 1 | Status: SHIPPED | OUTPATIENT
Start: 2023-08-28

## 2023-08-28 NOTE — PROGRESS NOTES
"Specialty Pharmacy Refill Coordination Note     Kem \"Zcah\" is a 67 y.o. male contacted today regarding refills of  2 specialty medication(s).    Reviewed and verified with patient:       Specialty medication(s) and dose(s) confirmed: yes    Refill Questions      Flowsheet Row Most Recent Value   Changes to allergies? No   Changes to medications? No   New conditions since last clinic visit Yes  [LUNG CANCER ( DIAGNOSED LAST MONTH GO TO [facility]), DOING INFUSIONS FOR CHEMOTHERAPY]   Unplanned office visit, urgent care, ED, or hospital admission in the last 4 weeks  No   How does patient/caregiver feel medication is working? Good   Financial problems or insurance changes  No   Since the previous refill, were any specialty medication doses or scheduled injections missed or delayed?  No   Does this patient require a clinical escalation to a pharmacist? Yes  [LUNG CANCER ( DIAGNOSED LAST MONTH GO TO [facility]), DOING INFUSIONS FOR CHEMOTHERAPY]            Delivery Questions      Flowsheet Row Most Recent Value   Delivery method Other (Comment)  [FED EX SHIP 8/29/23 DELIVERY 8/30/23]   Delivery address correct? Yes   Delivery phone number [phone]   Preferred delivery time? Anytime   Number of medications in delivery 2   Medication being filled and delivered TRESIBA, TRULICITY   Doses left of specialty medications 2 WEEKS   Is there any medication that is due not being filled? No   Supplies needed? No supplies needed   Cooler needed? Yes   Do any medications need mixed or dated? No   Additional comments $0   Questions or concerns for the pharmacist? No   Explain any questions or concerns for the pharmacist N/A   Are any medications first time fills? No   Tracking number for delivery N/A   Shipment status Cooler packed          *Patient stated he will be getting new insurance on 9/1/23 and may have to change pharmacies*         Follow-up: 76 day(s)     Hayley Duffy, Pharmacy Technician  Specialty Pharmacy " Technician

## 2023-08-29 ENCOUNTER — SPECIALTY PHARMACY (OUTPATIENT)
Dept: ENDOCRINOLOGY | Age: 68
End: 2023-08-29
Payer: MEDICARE

## 2023-08-29 NOTE — PROGRESS NOTES
Specialty Pharmacy Patient Management Program  One-Time Clinical Outreach     Kem Alvarado is a 67 y.o. male seen by an Endocrinology provider for Type 2 Diabetes and enrolled in the Endocrinology Patient Management program offered by [facility].      A one-time pharmacist escalation outreach scheduled due to patient reporting new lung cancer diagnosis during refill outreach with care coordinator 8/28/23. Pt now on chemotherapy regimen of carboplatin, paclitaxel and Keytruda. No medication interactions with current medications. Pt has received extensive counseling on chemotherapy medications via oncology and infusion center.     Patient will need to continue to closely monitor blood sugars and report any significant diet changes, hyperglycemia, or hypoglycemia. Pt has an appointment with JIM Courtney 9/12/2023.    Nereyda Arriaga, PharmD, BCPS, BCCCP  Clinical Specialty Pharmacist, Endocrinology  8/29/2023  11:52 EDT

## 2023-09-05 RX ORDER — ATORVASTATIN CALCIUM 80 MG/1
80 TABLET, FILM COATED ORAL DAILY
Qty: 90 TABLET | Refills: 1 | Status: SHIPPED | OUTPATIENT
Start: 2023-09-05

## 2023-09-12 ENCOUNTER — OFFICE VISIT (OUTPATIENT)
Dept: ENDOCRINOLOGY | Age: 68
End: 2023-09-12
Payer: MEDICARE

## 2023-09-12 VITALS
OXYGEN SATURATION: 96 % | SYSTOLIC BLOOD PRESSURE: 130 MMHG | TEMPERATURE: 96.4 F | DIASTOLIC BLOOD PRESSURE: 70 MMHG | BODY MASS INDEX: 31.93 KG/M2 | HEIGHT: 75 IN | HEART RATE: 80 BPM | WEIGHT: 256.8 LBS

## 2023-09-12 DIAGNOSIS — E11.65 TYPE 2 DIABETES MELLITUS WITH HYPERGLYCEMIA, WITH LONG-TERM CURRENT USE OF INSULIN: Primary | ICD-10-CM

## 2023-09-12 DIAGNOSIS — N18.2 STAGE 2 CHRONIC KIDNEY DISEASE: ICD-10-CM

## 2023-09-12 DIAGNOSIS — Z79.4 TYPE 2 DIABETES MELLITUS WITH HYPERGLYCEMIA, WITH LONG-TERM CURRENT USE OF INSULIN: Primary | ICD-10-CM

## 2023-09-12 DIAGNOSIS — E78.2 HYPERLIPEMIA, MIXED: ICD-10-CM

## 2023-09-12 DIAGNOSIS — E06.3 HYPOTHYROIDISM DUE TO HASHIMOTO'S THYROIDITIS: ICD-10-CM

## 2023-09-12 DIAGNOSIS — E03.8 HYPOTHYROIDISM DUE TO HASHIMOTO'S THYROIDITIS: ICD-10-CM

## 2023-09-12 RX ORDER — NATEGLINIDE 120 MG/1
TABLET ORAL
COMMUNITY

## 2023-09-12 RX ORDER — PROMETHAZINE HYDROCHLORIDE 25 MG/1
12.5-25 TABLET ORAL
COMMUNITY
Start: 2023-08-01

## 2023-09-12 RX ORDER — INSULIN DEGLUDEC 200 U/ML
120 INJECTION, SOLUTION SUBCUTANEOUS DAILY
Qty: 54 ML | Refills: 3 | Status: SHIPPED | OUTPATIENT
Start: 2023-09-12

## 2023-09-12 RX ORDER — ONDANSETRON HYDROCHLORIDE 8 MG/1
8 TABLET, FILM COATED ORAL
COMMUNITY
Start: 2023-08-01

## 2023-09-12 RX ORDER — ROSUVASTATIN CALCIUM 20 MG/1
TABLET, COATED ORAL
COMMUNITY

## 2023-09-12 NOTE — PROGRESS NOTES
"Chief Complaint  Diabetes (Checks blood sugars once a day this morning was 71)    Subjective        Kem Alvarado presents to [facility] ENDOCR[facility]OLOGY  History of Present Illness    Has right upper lobe lung cancer- on chemo q 3 weeks   No surgical intervention at this time   Does get steroid with treatment     HPI:   - 67 year old male here for management of diabetes mellitus type 2  - Has had diabetes for 20+ years  - Complications include CKD 2  - does have fasting hypoglycemia in the 60s   - down about 15 pounds   - currently taking Trulicity 3 mg weekly, Farxiga 10 mg daily, Tresiba 130 units daily  - Is also on Vascepa 2 g bid and lipitor 80mg daily, eating more whole milk to help settle his stomach after chemo  treatments  - planning to get eye exam soon   - On Synthroid 125 mcg daily     Objective   Vital Signs:  /70   Pulse 80   Temp 96.4 °F (35.8 °C) (Temporal)   Ht 190.5 cm (75\")   Wt 116 kg (256 lb 12.8 oz)   SpO2 96%   BMI 32.10 kg/m²   Estimated body mass index is 32.1 kg/m² as calculated from the following:    Height as of this encounter: 190.5 cm (75\").    Weight as of this encounter: 116 kg (256 lb 12.8 oz).             Physical Exam  Vitals reviewed.   Constitutional:       General: He is not in acute distress.  HENT:      Head: Normocephalic and atraumatic.   Cardiovascular:      Rate and Rhythm: Normal rate.   Pulmonary:      Effort: Pulmonary effort is normal. No respiratory distress.   Musculoskeletal:         General: No signs of injury. Normal range of motion.      Cervical back: Normal range of motion and neck supple.   Skin:     General: Skin is warm and dry.   Neurological:      Mental Status: He is alert and oriented to person, place, and time. Mental status is at baseline.   Psychiatric:         Mood and Affect: Mood normal.         Behavior: Behavior normal.         Thought Content: Thought content normal.         Judgment: Judgment normal.      Result " Review :  The following data was reviewed by: JIM Courtney on 09/12/2023:  Common labs          9/1/2023    09:15 9/5/2023    10:37 9/8/2023    10:57   Common Labs   WBC 4.72      3.62       Hemoglobin 11.9     12.4  12.1       Hematocrit 38.3     38.3  38.4       Platelets 202      136       Total Cholesterol  144     Triglycerides  487     HDL Cholesterol  20     LDL Cholesterol   51     Hemoglobin A1C  7.90        Details          This result is from an external source.                          Assessment and Plan   Diagnoses and all orders for this visit:    1. Type 2 diabetes mellitus with hyperglycemia, with long-term current use of insulin (Primary)    2. Hypothyroidism due to Hashimoto's thyroiditis    3. Hyperlipemia, mixed    4. Stage 2 chronic kidney disease    Other orders  -     Insulin Degludec (Tresiba FlexTouch) 200 UNIT/ML solution pen-injector pen injection; Inject 120 Units under the skin into the appropriate area as directed Daily.  Dispense: 54 mL; Refill: 3             Follow Up   Return in about 3 months (around 12/12/2023).    A1c slightly above target range < 7.5%  Rapid sample given to have on hand incase of hyperglycemia following chemo/steroid treatment   Decrease toujeo to 120u daily to prevent fasting hypoglycemia- ok to take 130u on day 1 and 2 of steroid dose    Thyroid labs stable     Patient was given instructions and counseling regarding his condition or for health maintenance advice. Please see specific information pulled into the AVS if appropriate.     JIM Courtney

## 2023-10-03 DIAGNOSIS — E78.1 FAMILIAL HYPERTRIGLYCERIDEMIA: ICD-10-CM

## 2023-10-03 RX ORDER — ICOSAPENT ETHYL 1000 MG/1
CAPSULE ORAL
Qty: 360 CAPSULE | Refills: 1 | Status: SHIPPED | OUTPATIENT
Start: 2023-10-03

## 2023-10-03 NOTE — TELEPHONE ENCOUNTER
Rx Refill Note  Requested Prescriptions     Pending Prescriptions Disp Refills    Vascepa 1 g capsule capsule [Pharmacy Med Name: VASCEPA 1 GM CAPS 1 Capsule] 360 capsule 1     Sig: TAKE 2 CAPSULES BY MOUTH 2 (TWO) TIMES A DAY WITH MEALS.      Last office visit with prescribing clinician: 9/12/2023   Last telemedicine visit with prescribing clinician: Visit date not found   Next office visit with prescribing clinician: 12/13/2023                         Would you like a call back once the refill request has been completed: [] Yes [] No    If the office needs to give you a call back, can they leave a voicemail: [] Yes [] No    Aimee Crowder MA  10/03/23, 11:25 EDT

## 2023-10-09 ENCOUNTER — PRIOR AUTHORIZATION (OUTPATIENT)
Dept: ENDOCRINOLOGY | Age: 68
End: 2023-10-09
Payer: MEDICARE

## 2023-10-09 RX ORDER — INSULIN DEGLUDEC 200 U/ML
120 INJECTION, SOLUTION SUBCUTANEOUS DAILY
Qty: 54 ML | Refills: 3 | Status: CANCELLED | OUTPATIENT
Start: 2023-10-09

## 2023-10-09 NOTE — TELEPHONE ENCOUNTER
Incoming Refill Request      Medication requested (name and dose): tresiba flextouch 200unit/ml    Pharmacy where request should be sent: [facility] pharmacy    Additional details provided by patient:     Best call back number: [phone]    Does the patient have less than a 3 day supply:  [] Yes  [] No    Rima Crane Rep  10/09/23, 15:31 EDT

## 2023-10-17 ENCOUNTER — OFFICE VISIT (OUTPATIENT)
Age: 68
End: 2023-10-17
Payer: MEDICARE

## 2023-10-17 VITALS
BODY MASS INDEX: 31.58 KG/M2 | WEIGHT: 254 LBS | SYSTOLIC BLOOD PRESSURE: 140 MMHG | HEIGHT: 75 IN | OXYGEN SATURATION: 98 % | HEART RATE: 73 BPM | DIASTOLIC BLOOD PRESSURE: 80 MMHG

## 2023-10-17 DIAGNOSIS — E78.5 DYSLIPIDEMIA: ICD-10-CM

## 2023-10-17 DIAGNOSIS — E06.3 HYPOTHYROIDISM DUE TO HASHIMOTO'S THYROIDITIS: ICD-10-CM

## 2023-10-17 DIAGNOSIS — E11.9 CONTROLLED TYPE 2 DIABETES MELLITUS WITHOUT COMPLICATION, WITHOUT LONG-TERM CURRENT USE OF INSULIN: ICD-10-CM

## 2023-10-17 DIAGNOSIS — E03.8 HYPOTHYROIDISM DUE TO HASHIMOTO'S THYROIDITIS: ICD-10-CM

## 2023-10-17 DIAGNOSIS — I10 BENIGN ESSENTIAL HYPERTENSION: ICD-10-CM

## 2023-10-17 DIAGNOSIS — I48.92 ATRIAL FLUTTER, UNSPECIFIED TYPE: Primary | ICD-10-CM

## 2023-10-17 DIAGNOSIS — N18.31 STAGE 3A CHRONIC KIDNEY DISEASE: ICD-10-CM

## 2023-10-18 RX ORDER — DAPAGLIFLOZIN 10 MG/1
1 TABLET, FILM COATED ORAL DAILY
Qty: 90 TABLET | Refills: 0 | Status: SHIPPED | OUTPATIENT
Start: 2023-10-18

## 2023-10-18 RX ORDER — LEVOTHYROXINE SODIUM 125 MCG
125 TABLET ORAL DAILY
Qty: 90 TABLET | Refills: 0 | Status: SHIPPED | OUTPATIENT
Start: 2023-10-18

## 2023-10-18 RX ORDER — FENOFIBRATE 145 MG/1
145 TABLET, COATED ORAL DAILY
Qty: 90 TABLET | Refills: 0 | Status: SHIPPED | OUTPATIENT
Start: 2023-10-18

## 2023-11-13 ENCOUNTER — PRIOR AUTHORIZATION (OUTPATIENT)
Dept: ENDOCRINOLOGY | Age: 68
End: 2023-11-13
Payer: MEDICARE

## 2023-11-15 ENCOUNTER — PATIENT MESSAGE (OUTPATIENT)
Dept: ENDOCRINOLOGY | Age: 68
End: 2023-11-15
Payer: MEDICARE

## 2023-11-16 RX ORDER — FENOFIBRATE 54 MG/1
TABLET ORAL
Qty: 90 TABLET | Refills: 2 | Status: SHIPPED | OUTPATIENT
Start: 2023-11-16

## 2023-11-16 NOTE — TELEPHONE ENCOUNTER
From: Kem Alvarado  To: Eula Vázquez  Sent: 11/15/2023 1:00 PM EST  Subject: PA for meds    Hi, my pharmacy just informed me that they need a PA for meds that I need. I have 3 doses left. If I do not need this anymore let me know. If this is an insurance issue [which I believe it is] can you replace it with something else? Thank you, Zach Alvarado

## 2023-11-17 RX ORDER — INSULIN GLARGINE 300 U/ML
120 INJECTION, SOLUTION SUBCUTANEOUS DAILY
Qty: 36 ML | Refills: 0 | Status: SHIPPED | OUTPATIENT
Start: 2023-11-17 | End: 2024-02-15

## 2023-11-30 ENCOUNTER — TELEPHONE (OUTPATIENT)
Dept: ENDOCRINOLOGY | Age: 68
End: 2023-11-30
Payer: MEDICARE

## 2023-11-30 DIAGNOSIS — Z79.4 TYPE 2 DIABETES MELLITUS WITH HYPERGLYCEMIA, WITH LONG-TERM CURRENT USE OF INSULIN: Primary | ICD-10-CM

## 2023-11-30 DIAGNOSIS — E11.65 TYPE 2 DIABETES MELLITUS WITH HYPERGLYCEMIA, WITH LONG-TERM CURRENT USE OF INSULIN: Primary | ICD-10-CM

## 2023-12-04 ENCOUNTER — LAB (OUTPATIENT)
Dept: ENDOCRINOLOGY | Age: 68
End: 2023-12-04
Payer: MEDICARE

## 2023-12-04 DIAGNOSIS — Z79.4 TYPE 2 DIABETES MELLITUS WITH HYPERGLYCEMIA, WITH LONG-TERM CURRENT USE OF INSULIN: ICD-10-CM

## 2023-12-04 DIAGNOSIS — E11.65 TYPE 2 DIABETES MELLITUS WITH HYPERGLYCEMIA, WITH LONG-TERM CURRENT USE OF INSULIN: ICD-10-CM

## 2023-12-04 LAB
BUN SERPL-MCNC: 38 MG/DL (ref 8–23)
BUN/CREAT SERPL: 21.7 (ref 7–25)
CALCIUM SERPL-MCNC: 10.6 MG/DL (ref 8.6–10.5)
CHLORIDE SERPL-SCNC: 105 MMOL/L (ref 98–107)
CO2 SERPL-SCNC: 29.8 MMOL/L (ref 22–29)
CREAT SERPL-MCNC: 1.75 MG/DL (ref 0.76–1.27)
EGFRCR SERPLBLD CKD-EPI 2021: 42.4 ML/MIN/1.73
GLUCOSE SERPL-MCNC: 76 MG/DL (ref 65–99)
HBA1C MFR BLD: 6.2 % (ref 4.8–5.6)
POTASSIUM SERPL-SCNC: 4.9 MMOL/L (ref 3.5–5.2)
SODIUM SERPL-SCNC: 142 MMOL/L (ref 136–145)
TSH SERPL DL<=0.005 MIU/L-ACNC: 1.98 UIU/ML (ref 0.27–4.2)

## 2023-12-13 ENCOUNTER — OFFICE VISIT (OUTPATIENT)
Dept: ENDOCRINOLOGY | Age: 68
End: 2023-12-13
Payer: MEDICARE

## 2023-12-13 VITALS
DIASTOLIC BLOOD PRESSURE: 64 MMHG | BODY MASS INDEX: 31.97 KG/M2 | OXYGEN SATURATION: 99 % | WEIGHT: 255.8 LBS | SYSTOLIC BLOOD PRESSURE: 110 MMHG | HEART RATE: 65 BPM | TEMPERATURE: 96.6 F

## 2023-12-13 DIAGNOSIS — E78.2 MIXED HYPERLIPIDEMIA: ICD-10-CM

## 2023-12-13 DIAGNOSIS — Z79.4 TYPE 2 DIABETES MELLITUS WITH HYPERGLYCEMIA, WITH LONG-TERM CURRENT USE OF INSULIN: Primary | ICD-10-CM

## 2023-12-13 DIAGNOSIS — E11.65 TYPE 2 DIABETES MELLITUS WITH HYPERGLYCEMIA, WITH LONG-TERM CURRENT USE OF INSULIN: Primary | ICD-10-CM

## 2023-12-13 DIAGNOSIS — N18.31 STAGE 3A CHRONIC KIDNEY DISEASE: ICD-10-CM

## 2023-12-13 DIAGNOSIS — E03.8 HYPOTHYROIDISM DUE TO HASHIMOTO'S THYROIDITIS: ICD-10-CM

## 2023-12-13 DIAGNOSIS — E06.3 HYPOTHYROIDISM DUE TO HASHIMOTO'S THYROIDITIS: ICD-10-CM

## 2023-12-13 RX ORDER — AZELASTINE 1 MG/ML
1-2 SPRAY, METERED NASAL
COMMUNITY
Start: 2023-11-14 | End: 2024-11-13

## 2023-12-13 NOTE — PROGRESS NOTES
"Chief Complaint  Diabetes (Checks bs once a day. This morning was 72. Last 30 days lowest bs: 57 Highest bs last 30 days: 92  Need refill on trulicity)    Subjective        Kme Alvarado presents to [facility] ENDOCRINOLOGY  History of Present Illness    Has right upper lobe lung cancer now I'm remission- on keytruda q 3 weeks      HPI:   - 67 year old male here for management of diabetes mellitus type 2, 20+ years  - Complications include CKD   - fasting hypoglycemia: resolved    - DM regimen: Trulicity 3 mg weekly, Farxiga 10 mg daily, Toujeo 120u daily  - not needing rapid insulin   - HLP, mixed regimen: Vascepa 2 g bid, fenofibrate and lipitor 80mg daily  - UTD 2023 with eye exam  - following with podiatry   - On Synthroid 125 mcg daily, does take it with other medication      Objective   Vital Signs:  /64   Pulse 65   Temp 96.6 °F (35.9 °C)   Wt 116 kg (255 lb 12.8 oz)   SpO2 99%   BMI 31.97 kg/m²   Estimated body mass index is 31.97 kg/m² as calculated from the following:    Height as of 10/17/23: 190.5 cm (75\").    Weight as of this encounter: 116 kg (255 lb 12.8 oz).             Physical Exam  Vitals reviewed.   Constitutional:       General: He is not in acute distress.  HENT:      Head: Normocephalic and atraumatic.   Cardiovascular:      Rate and Rhythm: Normal rate.   Pulmonary:      Effort: Pulmonary effort is normal. No respiratory distress.   Musculoskeletal:         General: No signs of injury. Normal range of motion.      Cervical back: Normal range of motion and neck supple.   Skin:     General: Skin is warm and dry.   Neurological:      Mental Status: He is alert and oriented to person, place, and time. Mental status is at baseline.   Psychiatric:         Mood and Affect: Mood normal.         Behavior: Behavior normal.         Thought Content: Thought content normal.         Judgment: Judgment normal.        Result Review :  The following data was reviewed by: " JIM Courtney on 12/13/2023:  Common labs          11/3/2023    09:55 12/1/2023    09:51 12/4/2023    09:48   Common Labs   Glucose   76    BUN   38    Creatinine   1.75    Sodium   142    Potassium   4.9    Chloride   105    Calcium   10.6    WBC 5.69     6.54        Hemoglobin 10.6     11.8        Hematocrit 33.2     36.6        Platelets 228     196        Hemoglobin A1C   6.20       Details          This result is from an external source.                          Assessment and Plan   Diagnoses and all orders for this visit:    1. Type 2 diabetes mellitus with hyperglycemia, with long-term current use of insulin (Primary)    2. Hypothyroidism due to Hashimoto's thyroiditis    3. Mixed hyperlipidemia    4. Stage 3a chronic kidney disease             Follow Up   Return in about 6 months (around 6/13/2024).    A1c at goal without hypoglycemia, continue regimen as above  Close monitoring of thyroid function on keytruda with heme/onc  Continue statin and vascepa     Patient was given instructions and counseling regarding his condition or for health maintenance advice. Please see specific information pulled into the AVS if appropriate.       JIM Courtney

## 2024-01-15 NOTE — TELEPHONE ENCOUNTER
Rx Refill Note  Requested Prescriptions     Pending Prescriptions Disp Refills    Synthroid 125 MCG tablet [Pharmacy Med Name: SYNTHROID 125 MCG TABS 125 Tablet] 90 tablet 0     Sig: TAKE 1 TABLET BY MOUTH DAILY.      Last office visit with prescribing clinician: 12/13/2023   Last telemedicine visit with prescribing clinician: Visit date not found   Next office visit with prescribing clinician: 6/3/2024                         Would you like a call back once the refill request has been completed: [] Yes [] No    If the office needs to give you a call back, can they leave a voicemail: [] Yes [] No    Wendy Hwang MA  01/15/24, 09:05 EST

## 2024-01-16 RX ORDER — LEVOTHYROXINE SODIUM 125 MCG
125 TABLET ORAL DAILY
Qty: 90 TABLET | Refills: 1 | Status: SHIPPED | OUTPATIENT
Start: 2024-01-16

## 2024-02-14 RX ORDER — INSULIN GLARGINE 300 U/ML
120 INJECTION, SOLUTION SUBCUTANEOUS DAILY
Qty: 36 ML | Refills: 0 | Status: SHIPPED | OUTPATIENT
Start: 2024-02-14 | End: 2024-05-14

## 2024-03-01 RX ORDER — ATORVASTATIN CALCIUM 80 MG/1
80 TABLET, FILM COATED ORAL DAILY
Qty: 90 TABLET | Refills: 1 | Status: SHIPPED | OUTPATIENT
Start: 2024-03-01

## 2024-03-01 NOTE — TELEPHONE ENCOUNTER
Rx Refill Note  Requested Prescriptions     Pending Prescriptions Disp Refills    atorvastatin (LIPITOR) 80 MG tablet [Pharmacy Med Name: ATORVASTATIN CALCIUM 80 MG 80 Tablet] 90 tablet 1     Sig: TAKE 1 TABLET BY MOUTH DAILY      Last office visit with prescribing clinician: 6/21/2023   Last telemedicine visit with prescribing clinician: Visit date not found   Next office visit with prescribing clinician: Visit date not found                         Would you like a call back once the refill request has been completed: [] Yes [] No    If the office needs to give you a call back, can they leave a voicemail: [] Yes [] No    Kandi Hwang  03/01/24, 09:51 EST

## 2024-04-08 RX ORDER — APIXABAN 5 MG/1
5 TABLET, FILM COATED ORAL 2 TIMES DAILY
Qty: 180 TABLET | Refills: 2 | Status: SHIPPED | OUTPATIENT
Start: 2024-04-08

## 2024-04-08 RX ORDER — DAPAGLIFLOZIN 10 MG/1
1 TABLET, FILM COATED ORAL DAILY
Qty: 90 TABLET | Refills: 0 | Status: SHIPPED | OUTPATIENT
Start: 2024-04-08

## 2024-04-08 NOTE — TELEPHONE ENCOUNTER
Rx Refill Note  Requested Prescriptions     Pending Prescriptions Disp Refills    Farxiga 10 MG tablet [Pharmacy Med Name: FARXIGA 10 MG TABS 10 Tablet] 90 tablet 0     Sig: TAKE 1 TABLET BY MOUTH DAILY.      Last office visit with prescribing clinician: 12/13/2023   Last telemedicine visit with prescribing clinician: Visit date not found   Next office visit with prescribing clinician: 6/3/2024                         Would you like a call back once the refill request has been completed: [] Yes [] No    If the office needs to give you a call back, can they leave a voicemail: [] Yes [] No    Wendy Hwang MA  04/08/24, 08:45 EDT

## 2024-04-10 DIAGNOSIS — E78.1 FAMILIAL HYPERTRIGLYCERIDEMIA: ICD-10-CM

## 2024-04-10 RX ORDER — ICOSAPENT ETHYL 1000 MG/1
CAPSULE ORAL
Qty: 360 CAPSULE | Refills: 1 | Status: SHIPPED | OUTPATIENT
Start: 2024-04-10

## 2024-04-10 NOTE — TELEPHONE ENCOUNTER
Rx Refill Note  Requested Prescriptions     Pending Prescriptions Disp Refills    Vascepa 1 g capsule capsule [Pharmacy Med Name: VASCEPA 1 GM CAPS 1 Capsule] 360 capsule 1     Sig: TAKE 2 CAPSULES BY MOUTH 2 (TWO) TIMES A DAY WITH MEALS.      Last office visit with prescribing clinician: 12/13/2023   Last telemedicine visit with prescribing clinician: Visit date not found   Next office visit with prescribing clinician: 6/3/2024                         Would you like a call back once the refill request has been completed: [] Yes [] No    If the office needs to give you a call back, can they leave a voicemail: [] Yes [] No    Kandi Hwang  04/10/24, 10:16 EDT

## 2024-04-24 ENCOUNTER — OFFICE VISIT (OUTPATIENT)
Dept: CARDIOLOGY | Facility: CLINIC | Age: 69
End: 2024-04-24
Payer: COMMERCIAL

## 2024-04-24 VITALS
DIASTOLIC BLOOD PRESSURE: 70 MMHG | HEIGHT: 75 IN | HEART RATE: 61 BPM | WEIGHT: 232 LBS | BODY MASS INDEX: 28.85 KG/M2 | SYSTOLIC BLOOD PRESSURE: 130 MMHG

## 2024-04-24 DIAGNOSIS — E78.5 DYSLIPIDEMIA: ICD-10-CM

## 2024-04-24 DIAGNOSIS — E11.69 HYPERLIPIDEMIA ASSOCIATED WITH TYPE 2 DIABETES MELLITUS: ICD-10-CM

## 2024-04-24 DIAGNOSIS — I10 BENIGN ESSENTIAL HYPERTENSION: ICD-10-CM

## 2024-04-24 DIAGNOSIS — E78.5 HYPERLIPIDEMIA ASSOCIATED WITH TYPE 2 DIABETES MELLITUS: ICD-10-CM

## 2024-04-24 DIAGNOSIS — I48.92 ATRIAL FLUTTER, UNSPECIFIED TYPE: Primary | ICD-10-CM

## 2024-04-24 DIAGNOSIS — E78.1 FAMILIAL HYPERTRIGLYCERIDEMIA: ICD-10-CM

## 2024-04-24 NOTE — PROGRESS NOTES
Subjective:     Encounter Date:04/24/2024      Patient ID: Kem Alvarado is a 68 y.o. male.    Chief Complaint: Follow-up paroxysmal atrial flutter  History of Present Illness  This is a 68-year-old man who follows with Dr. Palacio and is new to me today.  He has a past medical history of paroxysmal atrial flutter, diabetes, hypertension and hyperlipidemia.    He is here today for a follow-up visit.  Since he was last seen he has been diagnosed with metastatic bone cancer.  He has already been through 3 rounds of chemotherapy.  He says he has portable O2 but does not have to wear very often.  He is sleeping with O2 at night at 1 L.  From a heart standpoint he has been doing pretty well.  He denies any chest pain, shortness of breath, palpitations, dizziness or syncope.  He denies lower extremity swelling, orthopnea or PND.  His blood pressure has been very well-controlled.    Prior history:  He was previously followed by Dr. Fair for management of cardiac risk factors due to a strong family history of coronary artery disease.  In 2015 he was admitted with atrial flutter.  Echocardiogram at that time showed normal left ventricular systolic function with normal wall motion and an EF of 65 to 70%, normal diastolic function and no significant valvular heart disease.  Following his discharge he had some intermittent palpitations but his symptoms resolved within a few days.  He did not have any recurrent episodes of atrial flutter.  In 2016 Dr. Fair and the patient discussed coming off of apixaban but the patient wanted to remain on it.    Dr. Palacio began following the patient in 2020.  He was doing well at that time and no changes were made to his management.    He was last seen by Dr. Palacio and October 2023.  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  He had undergone a repeat PET CT scan which showed improvement except for some potential  enlargement of metastasis involving his right femur.  He was set to start Keytruda later that week.    I have reviewed and updated as appropriate allergies, current medications, past family history, past medical history, past surgical history and problem list.    Review of Systems   Constitutional: Negative for fever, malaise/fatigue, weight gain and weight loss.   HENT:  Negative for congestion, hoarse voice and sore throat.    Eyes:  Negative for blurred vision and double vision.   Cardiovascular:  Negative for chest pain, dyspnea on exertion, leg swelling, orthopnea, palpitations and syncope.   Respiratory:  Negative for cough, shortness of breath and wheezing.    Gastrointestinal:  Negative for abdominal pain, hematemesis, hematochezia and melena.   Genitourinary:  Negative for dysuria and hematuria.   Neurological:  Negative for dizziness, headaches, light-headedness and numbness.   Psychiatric/Behavioral:  Negative for depression. The patient is not nervous/anxious.          Current Outpatient Medications:     Accu-Chek Melida Plus test strip, Dx code E11.9 CHECK BLOOD SUGAR 5 TIMES DAILY, Disp: 500 each, Rfl: 3    allopurinol (ZYLOPRIM) 100 MG tablet, Take 1 tablet by mouth Daily., Disp: 90 tablet, Rfl: 3    amLODIPine (NORVASC) 5 MG tablet, TAKE 1 TABLET BY MOUTH TWICE DAILY, Disp: 180 tablet, Rfl: 3    atorvastatin (LIPITOR) 80 MG tablet, TAKE 1 TABLET BY MOUTH DAILY, Disp: 90 tablet, Rfl: 1    azelastine (ASTELIN) 0.1 % nasal spray, 1-2 sprays into the nostril(s) as directed by provider., Disp: , Rfl:     Blood Glucose Monitoring Suppl (OneTouch Verio Flex System) w/Device kit, 1 Device 5 (Five) Times a Day. e11.8, Disp: 1 kit, Rfl: 0    Eliquis 5 MG tablet tablet, TAKE ONE TABLET BY MOUTH TWICE A DAY, Disp: 180 tablet, Rfl: 2    Farxiga 10 MG tablet, TAKE 1 TABLET BY MOUTH DAILY., Disp: 90 tablet, Rfl: 0    fenofibrate (TRICOR) 54 MG tablet, By mouth take one tab daily., Disp: 90 tablet, Rfl: 2    glucose  "blood (OneTouch Verio) test strip, Use to test blood sugar 5 times daily E11.8, Disp: 500 each, Rfl: 1    Insulin Syringes, Disposable, U-100 1 ML misc, Use to inject insulin, Disp: 200 each, Rfl: 0    metoprolol tartrate (LOPRESSOR) 50 MG tablet, TAKE ONE TABLET BY MOUTH TWICE A DAY, Disp: 60 tablet, Rfl: 0    nateglinide (STARLIX) 120 MG tablet, , Disp: , Rfl:     ondansetron (ZOFRAN) 8 MG tablet, Take 1 tablet by mouth., Disp: , Rfl:     OneTouch Delica Lancets 33G misc, Use to test bloddsugar 5 times daily E11.8, Disp: 500 each, Rfl: 1    promethazine (PHENERGAN) 25 MG tablet, Take 0.5-1 tablets by mouth., Disp: , Rfl:     Synthroid 125 MCG tablet, TAKE 1 TABLET BY MOUTH DAILY., Disp: 90 tablet, Rfl: 1    Syringe 22G X 1\" 3 ML misc, Use 1 every 2 weeks for testosterone, Disp: 6 each, Rfl: 3    Toujeo Max SoloStar 300 UNIT/ML solution pen-injector injection, INJECT 120 UNITS UNDER THE SKIN INTO THE APPROPRIATE AREA AS DIRECTED DAILY FOR 90 DAYS., Disp: 36 mL, Rfl: 0    TRUEplus 5-Bevel Pen Needles 31G X 6 MM misc, , Disp: , Rfl:     Trulicity 3 MG/0.5ML solution pen-injector, INJECT 0.5 ML UNDER THE SKIN INTO THE APPROPRIATE AREA AS DIRECTED 1 (ONE) TIME PER WEEK., Disp: 6 mL, Rfl: 1    Vascepa 1 g capsule capsule, TAKE 2 CAPSULES BY MOUTH 2 (TWO) TIMES A DAY WITH MEALS., Disp: 360 capsule, Rfl: 1    Past Medical History:   Diagnosis Date    ADHD (attention deficit hyperactivity disorder)     Atrial flutter     Benign essential hypertension     Benign polyp of large intestine     Diabetes mellitus     Hyperlipidemia     Hypoglycemia     Pancreatitis     Testosterone deficiency     Type 2 diabetes mellitus     Vitamin D deficiency        Past Surgical History:   Procedure Laterality Date    COLONOSCOPY  06/19/2023    SHOULDER SURGERY Left        Family History   Problem Relation Age of Onset    Cancer Mother     Diabetes Mother     Heart attack Father     Diabetes Father     Heart attack Sister     Diabetes Sister " "    Nephrolithiasis Brother     Urolithiasis Other     No Known Problems Maternal Grandmother     No Known Problems Maternal Grandfather     No Known Problems Paternal Grandmother     No Known Problems Paternal Grandfather        Social History     Tobacco Use    Smoking status: Former     Current packs/day: 0.25     Average packs/day: 0.3 packs/day for 5.0 years (1.3 ttl pk-yrs)     Types: Cigarettes    Smokeless tobacco: Never    Tobacco comments:     caffeine use   Substance Use Topics    Alcohol use: Yes     Comment: occassional    Drug use: No         ECG 12 Lead    Date/Time: 4/25/2024 9:41 AM  Performed by: Katherine Moreno APRN    Authorized by: Katherine Moreno APRN  Comparison: compared with previous ECG from 10/17/2023  Similar to previous ECG  Rhythm: sinus rhythm             Objective:     Visit Vitals  /70   Pulse 61   Ht 190.5 cm (75\")   Wt 105 kg (232 lb)   BMI 29.00 kg/m²             Physical Exam  Constitutional:       Appearance: Normal appearance. He is normal weight.   HENT:      Head: Normocephalic.   Neck:      Vascular: No carotid bruit.   Cardiovascular:      Rate and Rhythm: Normal rate and regular rhythm.      Chest Wall: PMI is not displaced.      Pulses: Normal pulses.           Radial pulses are 2+ on the right side and 2+ on the left side.        Posterior tibial pulses are 2+ on the right side and 2+ on the left side.      Heart sounds: Normal heart sounds. No murmur heard.     No friction rub. No gallop.   Pulmonary:      Effort: Pulmonary effort is normal.      Breath sounds: Normal breath sounds.   Abdominal:      General: Bowel sounds are normal. There is no distension.      Palpations: Abdomen is soft.   Musculoskeletal:      Right lower leg: No edema.      Left lower leg: No edema.   Skin:     General: Skin is warm and dry.      Capillary Refill: Capillary refill takes less than 2 seconds.   Neurological:      Mental Status: He is alert and oriented to person, place, and " time.   Psychiatric:         Mood and Affect: Mood normal.         Behavior: Behavior normal.         Thought Content: Thought content normal.          Lab Review:   Lipid Panel          7/6/2023    09:26 9/5/2023    10:37   Lipid Panel   Total Cholesterol 129  144    Triglycerides 184  487    HDL Cholesterol 38  20    VLDL Cholesterol 31  73    LDL Cholesterol  60  51          Cardiac Procedures:       Assessment:         Diagnoses and all orders for this visit:    1. Atrial flutter, unspecified type (Primary)    2. Benign essential hypertension    3. Dyslipidemia    4. Familial hypertriglyceridemia    5. Hyperlipidemia associated with type 2 diabetes mellitus    Other orders  -     ECG 12 Lead            Plan:       PAF: isolated event. No recurrence known. Remains on metoprolol and apixaban.  HTN: blood pressure stable. No changes.  HLD: on statin. Goal LDL < 70. Most recent in September was 51  Coronary artery calcification: EKG is normal and no anginal symptoms reported. Continue medical management.  Diabets  CKD stage 3  Right upper lobe squamous cell carcinoma: now with bone metastasis . Undergoing chemo currently.    Thank you for allowing me to participate in this patient's care. Please call with any questions or concerns. Mr. Alvarado will follow up with Dr. Palacio in 6 months.          Your medication list            Accurate as of April 24, 2024 11:59 PM. If you have any questions, ask your nurse or doctor.                CONTINUE taking these medications        Instructions Last Dose Given Next Dose Due   Accu-Chek Melida Plus test strip  Generic drug: glucose blood      Dx code E11.9 CHECK BLOOD SUGAR 5 TIMES DAILY       OneTouch Verio test strip  Generic drug: glucose blood      Use to test blood sugar 5 times daily E11.8       allopurinol 100 MG tablet  Commonly known as: ZYLOPRIM      Take 1 tablet by mouth Daily.       amLODIPine 5 MG tablet  Commonly known as: NORVASC      TAKE 1 TABLET BY MOUTH  "TWICE DAILY       atorvastatin 80 MG tablet  Commonly known as: LIPITOR      TAKE 1 TABLET BY MOUTH DAILY       azelastine 0.1 % nasal spray  Commonly known as: ASTELIN      1-2 sprays into the nostril(s) as directed by provider.       Eliquis 5 MG tablet tablet  Generic drug: apixaban      TAKE ONE TABLET BY MOUTH TWICE A DAY       Farxiga 10 MG tablet  Generic drug: dapagliflozin Propanediol      TAKE 1 TABLET BY MOUTH DAILY.       fenofibrate 54 MG tablet  Commonly known as: TRICOR      By mouth take one tab daily.       Insulin Syringes (Disposable) U-100 1 ML misc      Use to inject insulin       metoprolol tartrate 50 MG tablet  Commonly known as: LOPRESSOR      TAKE ONE TABLET BY MOUTH TWICE A DAY       nateglinide 120 MG tablet  Commonly known as: STARLIX           ondansetron 8 MG tablet  Commonly known as: ZOFRAN      Take 1 tablet by mouth.       OneTouch Delica Lancets 33G misc      Use to test bloddsugar 5 times daily E11.8       OneTouch Verio Flex System w/Device kit      1 Device 5 (Five) Times a Day. e11.8       promethazine 25 MG tablet  Commonly known as: PHENERGAN      Take 0.5-1 tablets by mouth.       Synthroid 125 MCG tablet  Generic drug: levothyroxine      TAKE 1 TABLET BY MOUTH DAILY.       Syringe 22G X 1\" 3 ML misc      Use 1 every 2 weeks for testosterone       Toujeo Max SoloStar 300 UNIT/ML solution pen-injector injection  Generic drug: Insulin Glargine (2 Unit Dial)      INJECT 120 UNITS UNDER THE SKIN INTO THE APPROPRIATE AREA AS DIRECTED DAILY FOR 90 DAYS.       TRUEplus 5-Bevel Pen Needles 31G X 6 MM misc  Generic drug: Insulin Pen Needle           Trulicity 3 MG/0.5ML solution pen-injector  Generic drug: Dulaglutide      INJECT 0.5 ML UNDER THE SKIN INTO THE APPROPRIATE AREA AS DIRECTED 1 (ONE) TIME PER WEEK.       Vascepa 1 g capsule capsule  Generic drug: icosapent ethyl      TAKE 2 CAPSULES BY MOUTH 2 (TWO) TIMES A DAY WITH MEALS.                  Katherine Moreno, " JIM  04/25/24  11:52 AM EDT

## 2024-05-09 DIAGNOSIS — Z79.4 TYPE 2 DIABETES MELLITUS WITH HYPERGLYCEMIA, WITH LONG-TERM CURRENT USE OF INSULIN: ICD-10-CM

## 2024-05-09 DIAGNOSIS — E11.65 TYPE 2 DIABETES MELLITUS WITH HYPERGLYCEMIA, WITH LONG-TERM CURRENT USE OF INSULIN: ICD-10-CM

## 2024-05-10 RX ORDER — BLOOD SUGAR DIAGNOSTIC
STRIP MISCELLANEOUS
Qty: 400 EACH | Refills: 1 | Status: SHIPPED | OUTPATIENT
Start: 2024-05-10

## 2024-05-17 RX ORDER — INSULIN GLARGINE 300 U/ML
120 INJECTION, SOLUTION SUBCUTANEOUS DAILY
Qty: 36 ML | Refills: 0 | Status: SHIPPED | OUTPATIENT
Start: 2024-05-17 | End: 2024-08-15

## 2024-05-17 NOTE — TELEPHONE ENCOUNTER
Rx Refill Note  Requested Prescriptions     Pending Prescriptions Disp Refills    Brianna Ruiz SoloStar 300 UNIT/ML solution pen-injector injection [Pharmacy Med Name: TOUJEO MAX SOLOSTAR 300  Solution Pen-injector] 36 mL 0     Sig: INJECT 120 UNITS UNDER THE SKIN INTO THE APPROPRIATE AREA AS DIRECTED DAILY FOR 90 DAYS.      Last office visit with prescribing clinician: 12/13/2023   Last telemedicine visit with prescribing clinician: Visit date not found   Next office visit with prescribing clinician: 6/3/2024                         Would you like a call back once the refill request has been completed: [] Yes [] No    If the office needs to give you a call back, can they leave a voicemail: [] Yes [] No    Kandi Hwang  05/17/24, 09:49 EDT

## 2024-06-13 DIAGNOSIS — I10 BENIGN ESSENTIAL HYPERTENSION: ICD-10-CM

## 2024-06-13 RX ORDER — AMLODIPINE BESYLATE 5 MG/1
TABLET ORAL
Qty: 180 TABLET | Refills: 3 | Status: SHIPPED | OUTPATIENT
Start: 2024-06-13

## 2024-06-24 RX ORDER — ATORVASTATIN CALCIUM 80 MG/1
80 TABLET, FILM COATED ORAL DAILY
Qty: 90 TABLET | Refills: 1 | OUTPATIENT
Start: 2024-06-24

## 2024-06-24 NOTE — TELEPHONE ENCOUNTER
Rx Refill Note  Requested Prescriptions     Pending Prescriptions Disp Refills    atorvastatin (LIPITOR) 80 MG tablet [Pharmacy Med Name: ATORVASTATIN CALCIUM 80 MG 80 Tablet] 90 tablet 1     Sig: TAKE 1 TABLET BY MOUTH DAILY      Last office visit with prescribing clinician: 12/13/2023   Last telemedicine visit with prescribing clinician: Visit date not found   Next office visit with prescribing clinician: Visit date not found                         Would you like a call back once the refill request has been completed: [] Yes [] No    If the office needs to give you a call back, can they leave a voicemail: [] Yes [] No    Wendy Hwang MA  06/24/24, 14:38 EDT

## 2024-07-08 RX ORDER — DAPAGLIFLOZIN 10 MG/1
1 TABLET, FILM COATED ORAL DAILY
Qty: 90 TABLET | Refills: 0 | OUTPATIENT
Start: 2024-07-08

## 2024-07-08 NOTE — TELEPHONE ENCOUNTER
Rx Refill Note  Requested Prescriptions     Pending Prescriptions Disp Refills    Farxiga 10 MG tablet [Pharmacy Med Name: FARXIGA 10 MG TABS 10 Tablet] 90 tablet 0     Sig: TAKE 1 TABLET BY MOUTH DAILY.      Last office visit with prescribing clinician: 12/13/2023   Last telemedicine visit with prescribing clinician: Visit date not found   Next office visit with prescribing clinician: Visit date not found                         Would you like a call back once the refill request has been completed: [] Yes [] No    If the office needs to give you a call back, can they leave a voicemail: [] Yes [] No    Kandi Hwang  07/08/24, 08:14 EDT

## 2024-07-16 RX ORDER — LEVOTHYROXINE SODIUM 125 MCG
125 TABLET ORAL DAILY
Qty: 90 TABLET | Refills: 1 | OUTPATIENT
Start: 2024-07-16

## 2024-07-16 NOTE — TELEPHONE ENCOUNTER
Rx Refill Note  Requested Prescriptions     Pending Prescriptions Disp Refills    Synthroid 125 MCG tablet [Pharmacy Med Name: SYNTHROID 125 MCG TABS 125 Tablet] 90 tablet 1     Sig: TAKE 1 TABLET BY MOUTH DAILY.      Last office visit with prescribing clinician: 12/13/2023   Last telemedicine visit with prescribing clinician: Visit date not found   Next office visit with prescribing clinician: Visit date not found                         Would you like a call back once the refill request has been completed: [] Yes [] No    If the office needs to give you a call back, can they leave a voicemail: [] Yes [] No    Kandi Hwang  07/16/24, 12:18 EDT